# Patient Record
Sex: FEMALE | Race: WHITE | NOT HISPANIC OR LATINO | Employment: UNEMPLOYED | URBAN - NONMETROPOLITAN AREA
[De-identification: names, ages, dates, MRNs, and addresses within clinical notes are randomized per-mention and may not be internally consistent; named-entity substitution may affect disease eponyms.]

---

## 2017-01-17 ENCOUNTER — OFFICE VISIT (OUTPATIENT)
Dept: FAMILY MEDICINE | Age: 37
End: 2017-01-17

## 2017-01-17 ENCOUNTER — APPOINTMENT (OUTPATIENT)
Dept: LAB | Age: 37
End: 2017-01-17

## 2017-01-17 VITALS
WEIGHT: 147.71 LBS | SYSTOLIC BLOOD PRESSURE: 122 MMHG | RESPIRATION RATE: 16 BRPM | HEIGHT: 63 IN | BODY MASS INDEX: 26.17 KG/M2 | DIASTOLIC BLOOD PRESSURE: 70 MMHG | HEART RATE: 74 BPM

## 2017-01-17 DIAGNOSIS — G43.109 MIGRAINE WITH AURA AND WITHOUT STATUS MIGRAINOSUS, NOT INTRACTABLE: Primary | ICD-10-CM

## 2017-01-17 DIAGNOSIS — K58.0 IRRITABLE BOWEL SYNDROME WITH DIARRHEA: ICD-10-CM

## 2017-01-17 DIAGNOSIS — Z13.9 SCREENING: ICD-10-CM

## 2017-01-17 LAB
ALBUMIN SERPL-MCNC: 3.9 G/DL (ref 3.6–5.1)
ALBUMIN/GLOB SERPL: 1.2 {RATIO} (ref 1–2.4)
ALP SERPL-CCNC: 68 UNITS/L (ref 45–117)
ALT SERPL-CCNC: 17 UNITS/L
ANION GAP SERPL CALC-SCNC: 13 MMOL/L (ref 10–20)
AST SERPL-CCNC: 13 UNITS/L
BASO+EOS+MONOS # BLD AUTO: 0.6 K/MCL (ref 0.1–1.1)
BASO+EOS+MONOS NFR BLD AUTO: 10 %
BILIRUB SERPL-MCNC: 0.4 MG/DL (ref 0.2–1)
BUN SERPL-MCNC: 6 MG/DL (ref 10–20)
BUN/CREAT SERPL: 9 (ref 7–25)
CALCIUM SERPL-MCNC: 9 MG/DL (ref 8.4–10.2)
CHLORIDE SERPL-SCNC: 106 MMOL/L (ref 98–107)
CO2 SERPL-SCNC: 30 MMOL/L (ref 21–32)
CREAT SERPL-MCNC: 0.68 MG/DL (ref 0.51–0.95)
DIFFERENTIAL METHOD BLD: ABNORMAL
ERYTHROCYTE [DISTWIDTH] IN BLOOD: 13.3 % (ref 11–15)
FASTING STATUS PATIENT QL REPORTED: 0 HRS
GLOBULIN SER-MCNC: 3.3 G/DL (ref 2–4)
GLUCOSE SERPL-MCNC: 86 MG/DL (ref 65–99)
HCT VFR BLD CALC: 41.6 % (ref 36–46.5)
HGB BLD-MCNC: 13.2 G/DL (ref 12–15.5)
LYMPHOCYTES # BLD AUTO: 2.8 K/MCL (ref 1–4.8)
LYMPHOCYTES NFR BLD AUTO: 45 %
MCH RBC QN AUTO: 28.5 PG (ref 26–34)
MCHC RBC AUTO-ENTMCNC: 31.7 G/DL (ref 32–36.5)
MCV RBC AUTO: 89.8 FL (ref 78–100)
NEUTROPHILS # BLD AUTO: 2.8 K/MCL (ref 1.8–7.7)
NEUTROPHILS NFR BLD AUTO: 45 %
PLATELET # BLD: 297 K/MCL (ref 140–450)
POTASSIUM SERPL-SCNC: 3.5 MMOL/L (ref 3.4–5.1)
PROT SERPL-MCNC: 7.2 G/DL (ref 6.4–8.2)
RBC # BLD: 4.63 MIL/MCL (ref 4–5.2)
SODIUM SERPL-SCNC: 145 MMOL/L (ref 135–145)
TSH SERPL-ACNC: 1.72 MCUNITS/ML (ref 0.35–5)
WBC # BLD: 6.2 K/MCL (ref 4.2–11)

## 2017-01-17 PROCEDURE — 99214 OFFICE O/P EST MOD 30 MIN: CPT | Performed by: FAMILY MEDICINE

## 2017-01-17 PROCEDURE — 85025 COMPLETE CBC W/AUTO DIFF WBC: CPT | Performed by: INTERNAL MEDICINE

## 2017-01-17 PROCEDURE — 36415 COLL VENOUS BLD VENIPUNCTURE: CPT | Performed by: INTERNAL MEDICINE

## 2017-01-17 RX ORDER — SUMATRIPTAN 25 MG/1
TABLET, FILM COATED ORAL
Qty: 9 TABLET | Refills: 5 | Status: SHIPPED | OUTPATIENT
Start: 2017-01-17 | End: 2018-12-03 | Stop reason: SDUPTHER

## 2017-01-17 RX ORDER — METOCLOPRAMIDE 10 MG/1
10 TABLET ORAL 4 TIMES DAILY PRN
Qty: 30 TABLET | Refills: 3 | Status: SHIPPED | OUTPATIENT
Start: 2017-01-17 | End: 2018-12-03 | Stop reason: SDUPTHER

## 2017-01-18 ENCOUNTER — TELEPHONE (OUTPATIENT)
Dept: FAMILY MEDICINE | Age: 37
End: 2017-01-18

## 2017-03-01 ENCOUNTER — TELEPHONE (OUTPATIENT)
Dept: GASTROENTEROLOGY | Age: 37
End: 2017-03-01

## 2017-03-11 ENCOUNTER — HOSPITAL ENCOUNTER (EMERGENCY)
Facility: HOSPITAL | Age: 37
Discharge: HOME | End: 2017-03-11
Attending: INTERNAL MEDICINE | Admitting: INTERNAL MEDICINE
Payer: COMMERCIAL

## 2017-03-11 VITALS
HEART RATE: 83 BPM | OXYGEN SATURATION: 98 % | DIASTOLIC BLOOD PRESSURE: 83 MMHG | SYSTOLIC BLOOD PRESSURE: 100 MMHG | TEMPERATURE: 97.7 F | RESPIRATION RATE: 16 BRPM

## 2017-03-11 DIAGNOSIS — R07.1 PAINFUL RESPIRATION: ICD-10-CM

## 2017-03-11 DIAGNOSIS — R42 DIZZINESS: ICD-10-CM

## 2017-03-11 LAB
ALBUMIN SERPL-MCNC: 3.3 G/DL (ref 3.4–5)
ALBUMIN UR-MCNC: 10 MG/DL
ALP SERPL-CCNC: 52 U/L (ref 40–150)
ALT SERPL W P-5'-P-CCNC: 26 U/L (ref 0–50)
AMYLASE SERPL-CCNC: 52 U/L (ref 30–110)
ANION GAP SERPL CALCULATED.3IONS-SCNC: 7 MMOL/L (ref 3–14)
APPEARANCE UR: ABNORMAL
AST SERPL W P-5'-P-CCNC: 13 U/L (ref 0–45)
BACTERIA #/AREA URNS HPF: ABNORMAL /HPF
BASOPHILS # BLD AUTO: 0.1 10E9/L (ref 0–0.2)
BASOPHILS NFR BLD AUTO: 0.9 %
BILIRUB SERPL-MCNC: 0.3 MG/DL (ref 0.2–1.3)
BILIRUB UR QL STRIP: NEGATIVE
BUN SERPL-MCNC: 18 MG/DL (ref 7–30)
CALCIUM SERPL-MCNC: 8.8 MG/DL (ref 8.5–10.1)
CHLORIDE SERPL-SCNC: 108 MMOL/L (ref 94–109)
CO2 SERPL-SCNC: 28 MMOL/L (ref 20–32)
COLOR UR AUTO: YELLOW
CREAT SERPL-MCNC: 0.63 MG/DL (ref 0.52–1.04)
DIFFERENTIAL METHOD BLD: NORMAL
EOSINOPHIL # BLD AUTO: 0.3 10E9/L (ref 0–0.7)
EOSINOPHIL NFR BLD AUTO: 4.2 %
ERYTHROCYTE [DISTWIDTH] IN BLOOD BY AUTOMATED COUNT: 12 % (ref 10–15)
GFR SERPL CREATININE-BSD FRML MDRD: ABNORMAL ML/MIN/1.7M2
GLUCOSE SERPL-MCNC: 95 MG/DL (ref 70–99)
GLUCOSE UR STRIP-MCNC: NEGATIVE MG/DL
HCG UR QL: NEGATIVE
HCT VFR BLD AUTO: 39.4 % (ref 35–47)
HGB BLD-MCNC: 13.2 G/DL (ref 11.7–15.7)
HGB UR QL STRIP: NEGATIVE
IMM GRANULOCYTES # BLD: 0 10E9/L (ref 0–0.4)
IMM GRANULOCYTES NFR BLD: 0.5 %
KETONES UR STRIP-MCNC: NEGATIVE MG/DL
LEUKOCYTE ESTERASE UR QL STRIP: ABNORMAL
LIPASE SERPL-CCNC: 234 U/L (ref 73–393)
LYMPHOCYTES # BLD AUTO: 2.9 10E9/L (ref 0.8–5.3)
LYMPHOCYTES NFR BLD AUTO: 45.8 %
MCH RBC QN AUTO: 29.1 PG (ref 26.5–33)
MCHC RBC AUTO-ENTMCNC: 33.5 G/DL (ref 31.5–36.5)
MCV RBC AUTO: 87 FL (ref 78–100)
MONOCYTES # BLD AUTO: 0.4 10E9/L (ref 0–1.3)
MONOCYTES NFR BLD AUTO: 6.6 %
MUCOUS THREADS #/AREA URNS LPF: PRESENT /LPF
NEUTROPHILS # BLD AUTO: 2.7 10E9/L (ref 1.6–8.3)
NEUTROPHILS NFR BLD AUTO: 42 %
NITRATE UR QL: NEGATIVE
NRBC # BLD AUTO: 0 10*3/UL
NRBC BLD AUTO-RTO: 0 /100
PH UR STRIP: 7 PH (ref 4.7–8)
PLATELET # BLD AUTO: 243 10E9/L (ref 150–450)
POTASSIUM SERPL-SCNC: 3.6 MMOL/L (ref 3.4–5.3)
PROT SERPL-MCNC: 6.9 G/DL (ref 6.8–8.8)
RBC # BLD AUTO: 4.54 10E12/L (ref 3.8–5.2)
RBC #/AREA URNS AUTO: 0 /HPF (ref 0–2)
SODIUM SERPL-SCNC: 143 MMOL/L (ref 133–144)
SP GR UR STRIP: 1.02 (ref 1–1.03)
SQUAMOUS #/AREA URNS AUTO: 22 /HPF (ref 0–1)
URN SPEC COLLECT METH UR: ABNORMAL
UROBILINOGEN UR STRIP-MCNC: NORMAL MG/DL (ref 0–2)
WBC # BLD AUTO: 6.4 10E9/L (ref 4–11)
WBC #/AREA URNS AUTO: 1 /HPF (ref 0–2)

## 2017-03-11 PROCEDURE — 81001 URINALYSIS AUTO W/SCOPE: CPT | Performed by: INTERNAL MEDICINE

## 2017-03-11 PROCEDURE — 96375 TX/PRO/DX INJ NEW DRUG ADDON: CPT

## 2017-03-11 PROCEDURE — 96374 THER/PROPH/DIAG INJ IV PUSH: CPT

## 2017-03-11 PROCEDURE — 83690 ASSAY OF LIPASE: CPT | Performed by: INTERNAL MEDICINE

## 2017-03-11 PROCEDURE — 25000132 ZZH RX MED GY IP 250 OP 250 PS 637: Performed by: INTERNAL MEDICINE

## 2017-03-11 PROCEDURE — 82150 ASSAY OF AMYLASE: CPT | Performed by: INTERNAL MEDICINE

## 2017-03-11 PROCEDURE — 99284 EMERGENCY DEPT VISIT MOD MDM: CPT | Mod: 25

## 2017-03-11 PROCEDURE — 71020 ZZHC CHEST TWO VIEWS, FRONT/LAT: CPT | Mod: TC

## 2017-03-11 PROCEDURE — 81025 URINE PREGNANCY TEST: CPT | Performed by: INTERNAL MEDICINE

## 2017-03-11 PROCEDURE — 25000125 ZZHC RX 250: Performed by: INTERNAL MEDICINE

## 2017-03-11 PROCEDURE — 99284 EMERGENCY DEPT VISIT MOD MDM: CPT | Performed by: INTERNAL MEDICINE

## 2017-03-11 PROCEDURE — 85025 COMPLETE CBC W/AUTO DIFF WBC: CPT | Performed by: INTERNAL MEDICINE

## 2017-03-11 PROCEDURE — 25000128 H RX IP 250 OP 636: Performed by: INTERNAL MEDICINE

## 2017-03-11 PROCEDURE — 96361 HYDRATE IV INFUSION ADD-ON: CPT

## 2017-03-11 PROCEDURE — S0028 INJECTION, FAMOTIDINE, 20 MG: HCPCS | Performed by: INTERNAL MEDICINE

## 2017-03-11 PROCEDURE — 80053 COMPREHEN METABOLIC PANEL: CPT | Performed by: INTERNAL MEDICINE

## 2017-03-11 PROCEDURE — 36415 COLL VENOUS BLD VENIPUNCTURE: CPT | Performed by: INTERNAL MEDICINE

## 2017-03-11 RX ORDER — ONDANSETRON 2 MG/ML
4 INJECTION INTRAMUSCULAR; INTRAVENOUS ONCE
Status: COMPLETED | OUTPATIENT
Start: 2017-03-11 | End: 2017-03-11

## 2017-03-11 RX ORDER — ALUMINA, MAGNESIA, AND SIMETHICONE 2400; 2400; 240 MG/30ML; MG/30ML; MG/30ML
30 SUSPENSION ORAL ONCE
Status: COMPLETED | OUTPATIENT
Start: 2017-03-11 | End: 2017-03-11

## 2017-03-11 RX ADMIN — SODIUM CHLORIDE 1000 ML: 9 INJECTION, SOLUTION INTRAVENOUS at 21:56

## 2017-03-11 RX ADMIN — ONDANSETRON 4 MG: 2 INJECTION INTRAMUSCULAR; INTRAVENOUS at 21:57

## 2017-03-11 RX ADMIN — ALUMINUM HYDROXIDE, MAGNESIUM HYDROXIDE, AND DIMETHICONE 30 ML: 400; 400; 40 SUSPENSION ORAL at 21:58

## 2017-03-11 RX ADMIN — FAMOTIDINE 20 MG: 10 INJECTION, SOLUTION INTRAVENOUS at 22:01

## 2017-03-11 NOTE — ED AVS SNAPSHOT
[facility]    [address]    [facility] [address]    Phone:  [phone]                                       Sydni Isabel   MRN: [id number]    Department:  [facility]   Date of Visit:  3/11/2017           Patient Information     Date Of Birth          1980        Your diagnoses for this visit were:     Painful respiration     Dizziness        You were seen by Zhang Fung MD.      Follow-up Information     Call Go Villarreal MD.    Specialty:  Family Practice    Contact information:    [facility]  [address] [place] [address]  [phone]          Discharge Instructions         Dizziness (Uncertain Cause)  Dizziness is a common symptom. It may be described as lightheadedness, spinning, or feeling like you are going to faint. Dizziness can have many causes.  Be sure to tell the healthcare provider about:    All medicines you take, including prescription, over-the-counter, herbs, and supplements    Any other symptoms you have    Any health problems you are being treated for    Anything that causes the dizziness to get worse or better  Today's exam did not show an exact cause for your dizziness. Other tests may be needed. Follow up with your healthcare provider.  Home care    Dizziness that occurs with sudden standing may be a sign of mild dehydration. Drink extra fluids for the next few days.    If you recently started a new medicine, stopped a medicine, or had the dose of a current medicine changed, talk with the prescribing healthcare provider. Your medicine plan may need adjustment.    If dizziness lasts more than a few seconds, sit or lie down until it passes. This may help prevent injury in case you pass out.    Do not drive or use power tools or dangerous equipment until you have had no dizziness for at least 48 hours.  Follow-up care  Follow up with your healthcare provider for further evaluation within the next 7 days or as advised.  When to  seek medical advice  Call your healthcare provider for any of the following:    Worsening of symptoms or new symptoms    Passing out or seizure    Repeated vomiting    Headache    Palpitations (the sense that your heart is fluttering or beating fast or hard)    Shortness of breath    Blood in vomit or stool (black or red color)    Weakness of an arm or leg or one side of the face    Vision or hearing changes    Trouble walking or speaking    Chest, arm, neck, back, or jaw pain       4635-0502 [employer]. [address] [place] [address]. All rights reserved. This information is not intended as a substitute for professional medical care. Always follow your healthcare professional's instructions.        Pleurisy    If you have pleurisy, the lining around your lungs is inflamed. This is most often due to a viral infection or pneumonia. It usually lasts for 10 to 14 days. It may cause sharp pain with breathing, coughing, sneezing, and movement. Antibiotics are usually not prescribed for this condition unless pneumonia is also present.  The following tips will help you care for your condition at home:    If symptoms are severe, rest at home for the first 2 to 3 days. When you resume activity, don't let yourself get too tired.    Do not smoke. Also avoid being exposed to secondhand smoke.    You may use over-the-counter medicines to control pain, unless another pain medicine was prescribed. (Note: If you have chronic liver or kidney disease or have ever had a stomach ulcer or gastrointestinal bleeding, talk with your healthcare provider before using these medicines. Also talk to your provider if you are taking medicine to prevent blood clots.) Aspirin should never be given to anyone younger than 18 years of age who is ill with a viral infection or fever. It may cause severe liver or brain damage.  Follow-up care  Follow up with your healthcare provider, or as advised.  When to seek medical  advice  Call your healthcare provider right away if any of these occur:    Fever over 100.4 F (38 C)    Coughing up lots of colored sputum (mucus)    Redness, pain, or swelling of the leg  Call 911, or get immediate medical care  Contact emergency services right away if any of these occur:    Increasing shortness of breath    Increasing chest pain, or pain that spreads to the neck, arm, or back    Coughing up blood    3709-6629 [employer]. [address] [place] [address]. All rights reserved. This information is not intended as a substitute for professional medical care. Always follow your healthcare professional's instructions.             Review of your medicines      Our records show that you are taking the medicines listed below. If these are incorrect, please call your family doctor or clinic.        Dose / Directions Last dose taken    albuterol 108 (90 BASE) MCG/ACT Inhaler   Commonly known as:  albuterol   Dose:  1 puff   Quantity:  1 Inhaler        Inhale 1 puff into the lungs 3 times daily For 3-5 days.   Refills:  0        EPINEPHrine 0.3 MG/0.3ML injection   Commonly known as:  EPIPEN   Dose:  0.3 mg   Quantity:  2 each        Inject 0.3 mLs into the muscle once as needed for anaphylaxis for 1 dose.   Refills:  3        fluticasone 50 MCG/ACT spray   Commonly known as:  FLONASE   Dose:  2 spray   Quantity:  16 g        Spray 2 sprays into both nostrils daily   Refills:  11        loratadine 10 MG tablet   Commonly known as:  CLARITIN   Dose:  10 mg   Quantity:  30 tablet        Take 1 tablet (10 mg) by mouth daily   Refills:  11        montelukast 10 MG tablet   Commonly known as:  SINGULAIR   Dose:  10 mg   Indication:  Perennial Rhinitis   Quantity:  30 tablet        Take 1 tablet (10 mg) by mouth At Bedtime   Refills:  11        multivitamin, therapeutic with minerals Tabs tablet   Dose:  1 tablet        Take 1 tablet by mouth daily   Refills:  0        NEXIUM PO   Dose:  40  "mg        Take 40 mg by mouth every morning (before breakfast)   Refills:  0        vitamin D 92308 UNIT capsule   Commonly known as:  ERGOCALCIFEROL   Dose:  88809 Units        Take 50,000 Units by mouth once a week   Refills:  0                Procedures and tests performed during your visit     Amylase    CBC with platelets differential    Comprehensive metabolic panel    HCG qualitative urine    Lipase    UA with Microscopic reflex to Culture    XR Chest 2 Views      Orders Needing Specimen Collection     None      Pending Results     Date and Time Order Name Status Description    3/11/2017 2236 XR Chest 2 Views In process             Pending Culture Results     No orders found from 3/9/2017 to 3/12/2017.            Thank you for choosing [facility]       Thank you for choosing [facility] for your care. Our goal is always to provide you with excellent care. Hearing back from our patients is one way we can continue to improve our services. Please take a few minutes to complete the written survey that you may receive in the mail after you visit with us. Thank you!        [employer] Information     "[employer] lets you send messages to your doctor, view your test results, renew your prescriptions, schedule appointments and more. To sign up, go to [email or web address] . Click on \"Log in\" on the left side of the screen, which will take you to the Welcome page. Then click on \"Sign up Now\" on the right side of the page.     You will be asked to enter the access code listed below, as well as some personal information. Please follow the directions to create your username and password.     Your access code is: [email or web address]  Expires: 2017 11:05 PM     Your access code will  in 90 days. If you need help or a new code, please call your [facility] clinic or [phone].        Care EveryWhere ID     This is your Care EveryWhere ID. This could be used by other organizations to access your [facility] medical " records  [id number]        After Visit Summary       This is your record. Keep this with you and show to your community pharmacist(s) and doctor(s) at your next visit.

## 2017-03-11 NOTE — ED AVS SNAPSHOT
[facility]    [address] [address]    Phone:  [phone]                                       Sydni Isabel   MRN: [id number]    Department:  [facility]   Date of Visit:  3/11/2017           After Visit Summary Signature Page     I have received my discharge instructions, and my questions have been answered. I have discussed any challenges I see with this plan with the nurse or doctor.    ..........................................................................................................................................  Patient/Patient Representative Signature      ..........................................................................................................................................  Patient Representative Print Name and Relationship to Patient    ..................................................               ................................................  Date                                            Time    ..........................................................................................................................................  Reviewed by Signature/Title    ...................................................              ..............................................  Date                                                            Time

## 2017-03-12 ASSESSMENT — ENCOUNTER SYMPTOMS
CONSTIPATION: 0
APNEA: 0
ANAL BLEEDING: 0
ACTIVITY CHANGE: 0
COUGH: 1
VOICE CHANGE: 0
EYE PAIN: 0
WHEEZING: 0
NAUSEA: 1
CHEST TIGHTNESS: 0
NUMBNESS: 0
FEVER: 0
SHORTNESS OF BREATH: 0
MYALGIAS: 0
FATIGUE: 0
NERVOUS/ANXIOUS: 1
CHOKING: 0
RECTAL PAIN: 0
STRIDOR: 0
DIARRHEA: 1
TROUBLE SWALLOWING: 0
DIZZINESS: 1
EYE REDNESS: 0
BACK PAIN: 0
NECK STIFFNESS: 0
LIGHT-HEADEDNESS: 0
PALPITATIONS: 0
COLOR CHANGE: 0
HEMATURIA: 0
CONFUSION: 0
FLANK PAIN: 0
DIFFICULTY URINATING: 0
ARTHRALGIAS: 0
DYSURIA: 0
CHILLS: 0
ABDOMINAL PAIN: 1
WOUND: 0
NECK PAIN: 0
SORE THROAT: 0
UNEXPECTED WEIGHT CHANGE: 0
DIAPHORESIS: 0
BLOOD IN STOOL: 0
APPETITE CHANGE: 0
ABDOMINAL DISTENTION: 0
HEADACHES: 0
VOMITING: 0

## 2017-03-12 NOTE — DISCHARGE INSTRUCTIONS
Dizziness (Uncertain Cause)  Dizziness is a common symptom. It may be described as lightheadedness, spinning, or feeling like you are going to faint. Dizziness can have many causes.  Be sure to tell the healthcare provider about:    All medicines you take, including prescription, over-the-counter, herbs, and supplements    Any other symptoms you have    Any health problems you are being treated for    Anything that causes the dizziness to get worse or better  Today's exam did not show an exact cause for your dizziness. Other tests may be needed. Follow up with your healthcare provider.  Home care    Dizziness that occurs with sudden standing may be a sign of mild dehydration. Drink extra fluids for the next few days.    If you recently started a new medicine, stopped a medicine, or had the dose of a current medicine changed, talk with the prescribing healthcare provider. Your medicine plan may need adjustment.    If dizziness lasts more than a few seconds, sit or lie down until it passes. This may help prevent injury in case you pass out.    Do not drive or use power tools or dangerous equipment until you have had no dizziness for at least 48 hours.  Follow-up care  Follow up with your healthcare provider for further evaluation within the next 7 days or as advised.  When to seek medical advice  Call your healthcare provider for any of the following:    Worsening of symptoms or new symptoms    Passing out or seizure    Repeated vomiting    Headache    Palpitations (the sense that your heart is fluttering or beating fast or hard)    Shortness of breath    Blood in vomit or stool (black or red color)    Weakness of an arm or leg or one side of the face    Vision or hearing changes    Trouble walking or speaking    Chest, arm, neck, back, or jaw pain       4339-1900 [employer]. [address] [place] [address], [place] [address]. All rights reserved. This information is not intended as a substitute for  professional medical care. Always follow your healthcare professional's instructions.        Pleurisy    If you have pleurisy, the lining around your lungs is inflamed. This is most often due to a viral infection or pneumonia. It usually lasts for 10 to 14 days. It may cause sharp pain with breathing, coughing, sneezing, and movement. Antibiotics are usually not prescribed for this condition unless pneumonia is also present.  The following tips will help you care for your condition at home:    If symptoms are severe, rest at home for the first 2 to 3 days. When you resume activity, don't let yourself get too tired.    Do not smoke. Also avoid being exposed to secondhand smoke.    You may use over-the-counter medicines to control pain, unless another pain medicine was prescribed. (Note: If you have chronic liver or kidney disease or have ever had a stomach ulcer or gastrointestinal bleeding, talk with your healthcare provider before using these medicines. Also talk to your provider if you are taking medicine to prevent blood clots.) Aspirin should never be given to anyone younger than 18 years of age who is ill with a viral infection or fever. It may cause severe liver or brain damage.  Follow-up care  Follow up with your healthcare provider, or as advised.  When to seek medical advice  Call your healthcare provider right away if any of these occur:    Fever over 100.4 F (38 C)    Coughing up lots of colored sputum (mucus)    Redness, pain, or swelling of the leg  Call 911, or get immediate medical care  Contact emergency services right away if any of these occur:    Increasing shortness of breath    Increasing chest pain, or pain that spreads to the neck, arm, or back    Coughing up blood    6771-9271 [employer]. [address] [place] [address], [place] [address]. All rights reserved. This information is not intended as a substitute for professional medical care. Always follow your healthcare professional's  instructions.

## 2017-03-12 NOTE — ED PROVIDER NOTES
History     Chief Complaint   Patient presents with     Chest Pain     to right shoulder and dizziness starting today     Abdominal Pain     nauseated and radiates up to under ribs      The history is provided by the patient.     Sydni Isabel is a 36 year old female who came for multiple non specific complint, chest pain , pleuresy, abdomninal pain, dizziness, nausea, anxiety    I have reviewed the Medications, Allergies, Past Medical and Surgical History, and Social History in the Epic system.    Review of Systems   Constitutional: Negative for activity change, appetite change, chills, diaphoresis, fatigue, fever and unexpected weight change.   HENT: Negative for congestion, dental problem, drooling, sore throat, trouble swallowing and voice change.    Eyes: Negative for pain, redness and visual disturbance.   Respiratory: Positive for cough. Negative for apnea, choking, chest tightness, shortness of breath, wheezing and stridor.    Cardiovascular: Positive for chest pain. Negative for palpitations and leg swelling.   Gastrointestinal: Positive for abdominal pain, diarrhea and nausea. Negative for abdominal distention, anal bleeding, blood in stool, constipation, rectal pain and vomiting.   Genitourinary: Negative for decreased urine volume, difficulty urinating, dysuria, flank pain, hematuria and vaginal bleeding.   Musculoskeletal: Negative for arthralgias, back pain, gait problem, myalgias, neck pain and neck stiffness.   Skin: Negative for color change, pallor, rash and wound.   Neurological: Positive for dizziness. Negative for syncope, light-headedness, numbness and headaches.   Psychiatric/Behavioral: Negative for confusion and suicidal ideas. The patient is nervous/anxious.        Physical Exam   BP: 120/81  Pulse: 83  Heart Rate: 75  Temp: 96.8  F (36  C)  Resp: 16  SpO2: 100 %  Physical Exam   Constitutional: She is oriented to person, place, and time. She appears well-developed and well-nourished.    HENT:   Head: Normocephalic and atraumatic.   Mouth/Throat: No oropharyngeal exudate.   Eyes: Conjunctivae are normal. Pupils are equal, round, and reactive to light.   Neck: Normal range of motion. Neck supple. No JVD present. No tracheal deviation present. No thyromegaly present.   Cardiovascular: Normal rate, regular rhythm, normal heart sounds and intact distal pulses.  Exam reveals no gallop and no friction rub.    No murmur heard.  Pulmonary/Chest: Effort normal and breath sounds normal. No stridor. No respiratory distress. She has no wheezes. She has no rales. She exhibits no tenderness.   Abdominal: Soft. Bowel sounds are normal. She exhibits no distension and no mass. There is no tenderness. There is no rebound and no guarding.   Musculoskeletal: Normal range of motion. She exhibits no edema or tenderness.   Lymphadenopathy:     She has no cervical adenopathy.   Neurological: She is alert and oriented to person, place, and time.   Skin: Skin is warm and dry. No rash noted. No erythema. No pallor.   Psychiatric: Her behavior is normal.   Nursing note and vitals reviewed.      ED Course     ED Course     Procedures               Labs Ordered and Resulted from Time of ED Arrival Up to the Time of Departure from the ED   COMPREHENSIVE METABOLIC PANEL - Abnormal; Notable for the following:        Result Value    Albumin 3.3 (*)     All other components within normal limits   ROUTINE UA WITH MICROSCOPIC REFLEX TO CULTURE - Abnormal; Notable for the following:     Protein Albumin Urine 10 (*)     Leukocyte Esterase Urine Trace (*)     Bacteria Urine Few (*)     Squamous Epithelial /HPF Urine 22 (*)     Mucous Urine Present (*)     All other components within normal limits   CBC WITH PLATELETS DIFFERENTIAL   HCG QUALITATIVE URINE   LIPASE   AMYLASE       Assessments & Plan (with Medical Decision Making)   Multiple non specific symptoms  Possible related to anxiety, viral disease  Basic workup negative  I  explained to her that I dont have any definite explanation for her symptoms, I advised her to return to ER for further evaluation if symptoms persisted  She voiced understanding and agreed  I have reviewed the nursing notes.    I have reviewed the findings, diagnosis, plan and need for follow up with the patient.    New Prescriptions    No medications on file       Final diagnoses:   Painful respiration   Dizziness       3/11/2017   [facility]     Zhang Fung MD  03/12/17 0043

## 2017-03-12 NOTE — ED NOTES
"Pt states she has had diarrhea since Tuesday. No vomiting, c/o some nausea. Stated today she had episodes of dizziness \"heart racing\". C/O pain in abdomen radiating up into mid sternal area and RUQ \"like under my ribs\".  "

## 2017-04-05 ENCOUNTER — WALK IN (OUTPATIENT)
Dept: URGENT CARE | Age: 37
End: 2017-04-05

## 2017-04-05 VITALS
DIASTOLIC BLOOD PRESSURE: 61 MMHG | HEART RATE: 98 BPM | RESPIRATION RATE: 16 BRPM | SYSTOLIC BLOOD PRESSURE: 110 MMHG | BODY MASS INDEX: 26.52 KG/M2 | OXYGEN SATURATION: 99 % | HEIGHT: 63 IN | WEIGHT: 149.69 LBS | TEMPERATURE: 98.4 F

## 2017-04-05 DIAGNOSIS — J40 BRONCHITIS: Primary | ICD-10-CM

## 2017-04-05 DIAGNOSIS — J01.90 ACUTE NON-RECURRENT SINUSITIS, UNSPECIFIED LOCATION: ICD-10-CM

## 2017-04-05 PROCEDURE — 99214 OFFICE O/P EST MOD 30 MIN: CPT | Performed by: NURSE PRACTITIONER

## 2017-04-05 RX ORDER — AMOXICILLIN AND CLAVULANATE POTASSIUM 875; 125 MG/1; MG/1
1 TABLET, FILM COATED ORAL 2 TIMES DAILY
Qty: 20 TABLET | Refills: 0 | Status: SHIPPED | OUTPATIENT
Start: 2017-04-05 | End: 2017-04-15

## 2017-04-05 RX ORDER — ALBUTEROL SULFATE 90 UG/1
2 AEROSOL, METERED RESPIRATORY (INHALATION) EVERY 4 HOURS PRN
Qty: 1 INHALER | Refills: 1 | Status: SHIPPED | OUTPATIENT
Start: 2017-04-05 | End: 2018-10-10 | Stop reason: ALTCHOICE

## 2017-04-07 ENCOUNTER — OFFICE VISIT (OUTPATIENT)
Dept: CHIROPRACTIC MEDICINE | Facility: OTHER | Age: 37
End: 2017-04-07
Attending: CHIROPRACTOR
Payer: COMMERCIAL

## 2017-04-07 ENCOUNTER — TELEPHONE (OUTPATIENT)
Dept: URGENT CARE | Age: 37
End: 2017-04-07

## 2017-04-07 DIAGNOSIS — M54.2 CERVICALGIA: ICD-10-CM

## 2017-04-07 DIAGNOSIS — M99.03 SEGMENTAL AND SOMATIC DYSFUNCTION OF LUMBAR REGION: ICD-10-CM

## 2017-04-07 DIAGNOSIS — M99.01 SEGMENTAL AND SOMATIC DYSFUNCTION OF CERVICAL REGION: Primary | ICD-10-CM

## 2017-04-07 DIAGNOSIS — M99.02 SEGMENTAL AND SOMATIC DYSFUNCTION OF THORACIC REGION: ICD-10-CM

## 2017-04-07 PROCEDURE — 99212 OFFICE O/P EST SF 10 MIN: CPT | Mod: 25 | Performed by: CHIROPRACTOR

## 2017-04-07 PROCEDURE — 98941 CHIROPRACT MANJ 3-4 REGIONS: CPT | Performed by: CHIROPRACTOR

## 2017-04-07 NOTE — MR AVS SNAPSHOT
After Visit Summary   4/7/2017    Sydni Isabel    MRN: [id number]           Patient Information     Date Of Birth          1980        Visit Information        Provider Department      4/7/2017 8:10 AM [facility]        Today's Diagnoses     Segmental and somatic dysfunction of cervical region    -  1    Cervicalgia        Segmental and somatic dysfunction of lumbar region        Segmental and somatic dysfunction of thoracic region           Follow-ups after your visit        Your next 10 appointments already scheduled     Apr 28, 2017  9:00 AM CDT   (Arrive by 8:45 AM)   Return Visit with Martina Dominguez PA-C   [facility]    [address]  [facility] [address]   [phone]              Who to contact     If you have questions or need follow up information about today's clinic visit or your schedule please contact  JANA BRANTLEY directly at [phone].  Normal or non-critical lab and imaging results will be communicated to you by MyChart, letter or phone within 4 business days after the clinic has received the results. If you do not hear from us within 7 days, please contact the clinic through [employer] or phone. If you have a critical or abnormal lab result, we will notify you by phone as soon as possible.  Submit refill requests through [employer] or call your pharmacy and they will forward the refill request to us. Please allow 3 business days for your refill to be completed.          Additional Information About Your Visit        MyChart Information     [employer] gives you secure access to your electronic health record. If you see a primary care provider, you can also send messages to your care team and make appointments. If you have questions, please call your primary care clinic.  If you do not have a primary care provider, please call [phone] and they will assist you.        Care EveryWhere ID     This is your Care  EveryWhere ID. This could be used by other organizations to access your [facility] medical records  [id number]         Blood Pressure from Last 3 Encounters:   03/11/17 100/83   08/05/16 101/65   07/01/16 100/62    Weight from Last 3 Encounters:   08/05/16 174 lb (78.9 kg)   07/01/16 176 lb (79.8 kg)   01/08/16 184 lb (83.5 kg)              We Performed the Following     CHIROPRAC MANIP,SPINAL,3-4 REGIONS        Primary Care Provider Office Phone # Fax #    Go Villarreal [phone]       [facility] [address] [facility] [address]        Thank you!     Thank you for choosing  [facility]  for your care. Our goal is always to provide you with excellent care. Hearing back from our patients is one way we can continue to improve our services. Please take a few minutes to complete the written survey that you may receive in the mail after your visit with us. Thank you!             Your Updated Medication List - Protect others around you: Learn how to safely use, store and throw away your medicines at www.disposemymeds.org.          This list is accurate as of: 4/7/17 11:59 PM.  Always use your most recent med list.                   Brand Name Dispense Instructions for use    albuterol 108 (90 BASE) MCG/ACT Inhaler    albuterol    1 Inhaler    Inhale 1 puff into the lungs 3 times daily For 3-5 days.       EPINEPHrine 0.3 MG/0.3ML injection    EPIPEN    2 each    Inject 0.3 mLs into the muscle once as needed for anaphylaxis for 1 dose.       fluticasone 50 MCG/ACT spray    FLONASE    16 g    Spray 2 sprays into both nostrils daily       loratadine 10 MG tablet    CLARITIN    30 tablet    Take 1 tablet (10 mg) by mouth daily       montelukast 10 MG tablet    SINGULAIR    30 tablet    Take 1 tablet (10 mg) by mouth At Bedtime       multivitamin, therapeutic with minerals Tabs tablet      Take 1 tablet by mouth daily       NEXIUM PO      Take 40 mg by mouth every morning (before  breakfast)       vitamin D 96129 UNIT capsule    ERGOCALCIFEROL     Take 50,000 Units by mouth once a week

## 2017-04-10 ENCOUNTER — TELEPHONE (OUTPATIENT)
Dept: URGENT CARE | Age: 37
End: 2017-04-10

## 2017-04-11 NOTE — PROGRESS NOTES
Subjective Finding:    Chief compalint: Patient presents with:  Neck Pain: daily headaches  Back Pain  , Pain Scale: 6/10, Intensity: sharp, Duration: 2 months, Radiating: no.    Date of injury:     Activities that the pain restricts:   Home/household/hobbies/social activities: no.  Work duties: no.  Sleep: no.  Makes symptoms better: rest.  Makes symptoms worse: activity, cervical extension and cervical flexion.  Have you seen anyone else for the symptoms? MD.  Work related: no.  Automobile related injury: no.    Objective and Assessment:    Posture Analysis:   High shoulder: .  Head tilt: .  High iliac crest: .  Head carriage: forward.  Thoracic Kyphosis: neutral.  Lumbar Lordosis: neutral.    Lumbar Range of Motion: .  Cervical Range of Motion: extension decreased, left lateral flexion decreased and right lateral flexion decreased.  Thoracic Range of Motion: left lateral flexion decreased.  Extremity Range of Motion: .    Palpation:   Lev scapulae: stiff, referred pain: no  Traps: sharp pain, referred pain: no    Segmental dysfunction pre-treatment and treatment area: C2, C5, C7, T4 and L5.    Assessment post-treatment:  Cervical: ROM increased.  Thoracic: ROM increased.  Lumbar: ROM increased.    Comments: .      Complicating Factors: .    Plan / Procedure:    Treatment plan: 2 times per week for 2 weeks.  Instructed patient: stretch as instructed at visit.  Short term goals: increase ROM.  Long term goals: restore normal function.  Prognosis: very good.

## 2017-04-28 ENCOUNTER — OFFICE VISIT (OUTPATIENT)
Dept: OTOLARYNGOLOGY | Facility: OTHER | Age: 37
End: 2017-04-28
Attending: PHYSICIAN ASSISTANT
Payer: COMMERCIAL

## 2017-04-28 ENCOUNTER — OFFICE VISIT (OUTPATIENT)
Dept: CHIROPRACTIC MEDICINE | Facility: OTHER | Age: 37
End: 2017-04-28
Attending: CHIROPRACTOR
Payer: COMMERCIAL

## 2017-04-28 VITALS
DIASTOLIC BLOOD PRESSURE: 70 MMHG | WEIGHT: 179 LBS | HEART RATE: 73 BPM | HEIGHT: 66 IN | OXYGEN SATURATION: 100 % | SYSTOLIC BLOOD PRESSURE: 102 MMHG | BODY MASS INDEX: 28.77 KG/M2 | TEMPERATURE: 97.2 F

## 2017-04-28 DIAGNOSIS — M99.01 SEGMENTAL AND SOMATIC DYSFUNCTION OF CERVICAL REGION: Primary | ICD-10-CM

## 2017-04-28 DIAGNOSIS — J30.2 PERENNIAL ALLERGIC RHINITIS WITH SEASONAL VARIATION: Primary | ICD-10-CM

## 2017-04-28 DIAGNOSIS — M54.2 CERVICALGIA: ICD-10-CM

## 2017-04-28 DIAGNOSIS — M99.02 SEGMENTAL AND SOMATIC DYSFUNCTION OF THORACIC REGION: ICD-10-CM

## 2017-04-28 DIAGNOSIS — J32.4 CHRONIC PANSINUSITIS: ICD-10-CM

## 2017-04-28 DIAGNOSIS — J30.89 PERENNIAL ALLERGIC RHINITIS WITH SEASONAL VARIATION: Primary | ICD-10-CM

## 2017-04-28 DIAGNOSIS — J31.0 CHRONIC RHINITIS: ICD-10-CM

## 2017-04-28 DIAGNOSIS — T78.40XD ALLERGIC REACTION, SUBSEQUENT ENCOUNTER: ICD-10-CM

## 2017-04-28 DIAGNOSIS — Z98.890 HISTORY OF SINUS SURGERY: ICD-10-CM

## 2017-04-28 DIAGNOSIS — J30.2 SEASONAL ALLERGIC RHINITIS, UNSPECIFIED ALLERGIC RHINITIS TRIGGER: ICD-10-CM

## 2017-04-28 PROCEDURE — 31231 NASAL ENDOSCOPY DX: CPT | Performed by: PHYSICIAN ASSISTANT

## 2017-04-28 PROCEDURE — 99213 OFFICE O/P EST LOW 20 MIN: CPT | Mod: 25 | Performed by: PHYSICIAN ASSISTANT

## 2017-04-28 PROCEDURE — 98941 CHIROPRACT MANJ 3-4 REGIONS: CPT | Performed by: CHIROPRACTOR

## 2017-04-28 RX ORDER — EPINEPHRINE 0.3 MG/.3ML
0.3 INJECTION SUBCUTANEOUS
Qty: 6 ML | Refills: 1 | Status: SHIPPED | OUTPATIENT
Start: 2017-04-28 | End: 2017-09-26

## 2017-04-28 RX ORDER — DEXLANSOPRAZOLE 60 MG/1
60 CAPSULE, DELAYED RELEASE ORAL DAILY
COMMUNITY
End: 2021-02-19

## 2017-04-28 RX ORDER — AZELASTINE HYDROCHLORIDE, FLUTICASONE PROPIONATE 137; 50 UG/1; UG/1
1 SPRAY, METERED NASAL 2 TIMES DAILY
Qty: 2 BOTTLE | Refills: 3 | Status: SHIPPED | OUTPATIENT
Start: 2017-04-28

## 2017-04-28 ASSESSMENT — PAIN SCALES - GENERAL: PAINLEVEL: NO PAIN (0)

## 2017-04-28 NOTE — PROGRESS NOTES
"Chief Complaint   Patient presents with     RECHECK     f/u chronic rhinits, AR and plugged ears.        Sydni is here to follow up on her chronic rhinitis, seasonal allergies. Last in August 2016. Since then, she has been on daily Claritin.  Uses Singulair a couple times a week and has not used the Nasonex for a couple months. She has not noticed improvement in any of her symptoms. She continues to have sinus pressure and PND. Continues to have intermittent sensation of bilateral ears feeling plugged. Occasional sharp pain in both ears, more so from wind. Feels blocked at times. Left worse than right. No otorrhea or hearing problems. No tinnitus or vertigo.     She is more concerned about white spots in the back of her throat that she had noticed a couple months ago. She was tested for strep at that time and was negative. The white spots never resolved. Occasionally will have a hard time swallowing, blaming it on her GERD. Will have intermittent cough due to the PND. No fever or SOB. She is considering the allergy shots.       8/5/16  MQT-  Dilution #6: Dust  Dilution #5: Grass, Cat, dog  Dilution #2: Trees, molds, molds.     No past medical history on file.   No Known Allergies  Current Outpatient Prescriptions   Medication     montelukast (SINGULAIR) 10 MG tablet     loratadine (CLARITIN) 10 MG tablet     fluticasone (FLONASE) 50 MCG/ACT nasal spray     Esomeprazole Magnesium (NEXIUM PO)     multivitamin, therapeutic with minerals (THERA-VIT-M) TABS     albuterol (ALBUTEROL) 108 (90 BASE) MCG/ACT inhaler     vitamin D (ERGOCALCIFEROL) 26093 UNIT capsule     EPINEPHrine (EPIPEN) 0.3 MG/0.3ML injection     No current facility-administered medications for this visit.       ROS: 10 point ROS neg other than the symptoms noted above in the HPI.  /70 (BP Location: Right arm, Patient Position: Chair, Cuff Size: Adult Regular)  Pulse 73  Temp 97.2  F (36.2  C) (Tympanic)  Ht 5' 6\" (1.676 m)  Wt 179 lb (81.2 kg) "  SpO2 100%  BMI 28.89 kg/m2  General Appearance:  healthy, alert, active and no distress  Head: Normocephalic. No masses, lesions, tenderness or abnormalities  Eyes: conjuctiva clear, PERRL, EOM intact  Ears: External ears normal.TM is intact without effusion.    Nose: Nares normal. Nasal turbinate hypertrophy.   Mouth: normal  Neck: Supple, no cervical adenopathy, no thyromegaly, Full range of motion in all planes        The lips appear normal and without lesion.  The dentition is  in good condition  The patient has a normal appearing and functioning hard and soft palate without bifid uvula or submucosal cleft.  The tongue is normal in appearance without erosive lesion or fungating mass.  The oral mucosa is soft and normal in appearance.  The patient also has a soft floor of mouth and base of tongue.  The tonsils are 2+.      To further evaluate the nasal cavity, I performed rigid nasal endoscopy.  I first sprayed the nasal cavity bilaterally with a mix of lidocaine and neosynephrine.  I then began on the left side using a 2.7mm, 30 degree rigid scope.  Clear secretions noted, no evidence of purulence, or polyps were noted. The left middle turbinate and middle meatus were clearly visualized and normal in appearance.  Looking up, the olfactory cleft was unobstructed.  Going further back, the sphenoethmoid recess was normal in appearance, with healthy appearing mucosa on the face of the sphenoid.  The nasopharynx was unremarkable, and the eustachian tube opening on this side was unobstructed.    I then turned my attention to the right side.   Clear secretions present, no purulence, polyps were noted.  The right middle turbinate and middle meatus were clearly visualized and normal in appearance.  Looking up, the olfactory cleft was unobstructed.  Going further back the right sphenoethmoid recess was normal in appearance, and eustachian tube opening was unobstructed.        ASSESSMENT:    ICD-10-CM    1. Perennial  allergic rhinitis with seasonal variation J30.89 azelastine-fluticasone (DYMISTA) 137-50 MCG/ACT nasal spray    J30.2    2. Seasonal allergic rhinitis, unspecified allergic rhinitis trigger J30.2 azelastine-fluticasone (DYMISTA) 137-50 MCG/ACT nasal spray   3. Chronic rhinitis J31.0    4. Allergic reaction, subsequent encounter T78.40XD EPINEPHrine 0.3 MG/0.3ML injection   5. Chronic pansinusitis J32.4    6. History of sinus surgery Z98.890    Continue with rinse  Continue with Claritin and Singulair  Try Dymista 1 spray twice a day  Sinus CT to be completed  Epipen renewed.   Return to start allergy injections.       Use nasal saline as discussed today. Over the counter Rosalba med saline irrigation is recommended.  Try to use hypertonic saline which is 2 packages in 1 rosalba med bottle per instructions on bottle.  Use this at least 2 times a day, blow your nose gently, then apply any other nasal medication that may have been prescribed today (nasal steroids or nasal antihistamines).  Take your antihistamine daily (cetirizine, loratadine, fexofenadine, or similar) or twice daily if recommended.      Due to the findings above,  modified quantitative testing (MQT) will be performed.  Signed consent was obtained, and the risks of immunotherapy were discussed, including the potential for anaphylaxis.    If immunotherapy (IT) is recommended, there is continued risk of anaphylaxis.   Anaphylaxis can cause death. The patient will need to be monitored for 30 minutes post injection.  They must present their epinephrine pen prior to injection.  Subcutaneous as well as sublingual immunotherapy (SLIT) were discussed as potential treatment options.  The patient was told SLIT is not approved by the FDA and is cash pay.  The general time frame of immunotherapy was discussed (generally 3-5 years, sometimes longer), and the basic immunology behind IT was discussed.          Martina Dominguez PA-C  ENT  [facility],  [place]  [phone]

## 2017-04-28 NOTE — MR AVS SNAPSHOT
After Visit Summary   4/28/2017    Sydni Isabel    MRN: [id number]           Patient Information     Date Of Birth          1980        Visit Information        Provider Department      4/28/2017 8:20 AM [facility]        Today's Diagnoses     Segmental and somatic dysfunction of cervical region    -  1    Cervicalgia        Segmental and somatic dysfunction of thoracic region           Follow-ups after your visit        Your next 10 appointments already scheduled     Apr 28, 2017  9:00 AM CDT   (Arrive by 8:45 AM)   Return Visit with Martina Dominguez PA-C   [facility] [place] [facility]    [address]  [place] [address]   [phone]              Who to contact     If you have questions or need follow up information about today's clinic visit or your schedule please contact  JANA BRANTLEY directly at [phone].  Normal or non-critical lab and imaging results will be communicated to you by MyChart, letter or phone within 4 business days after the clinic has received the results. If you do not hear from us within 7 days, please contact the clinic through [employer] or phone. If you have a critical or abnormal lab result, we will notify you by phone as soon as possible.  Submit refill requests through ipnexus or call your pharmacy and they will forward the refill request to us. Please allow 3 business days for your refill to be completed.          Additional Information About Your Visit        MyChart Information     ipnexus gives you secure access to your electronic health record. If you see a primary care provider, you can also send messages to your care team and make appointments. If you have questions, please call your primary care clinic.  If you do not have a primary care provider, please call [phone] and they will assist you.        Care EveryWhere ID     This is your Care EveryWhere ID. This could be used by other  organizations to access your [facility] medical records  [id number]         Blood Pressure from Last 3 Encounters:   03/11/17 100/83   08/05/16 101/65   07/01/16 100/62    Weight from Last 3 Encounters:   08/05/16 174 lb (78.9 kg)   07/01/16 176 lb (79.8 kg)   01/08/16 184 lb (83.5 kg)              We Performed the Following     CHIROPRAC MANIP,SPINAL,1-2 REGIONS        Primary Care Provider Office Phone # Fax #    oG Villarreal [phone]       Counts include 234 beds at the [facility] [address] [facility] [address]        Thank you!     Thank you for choosing  [facility]  for your care. Our goal is always to provide you with excellent care. Hearing back from our patients is one way we can continue to improve our services. Please take a few minutes to complete the written survey that you may receive in the mail after your visit with us. Thank you!             Your Updated Medication List - Protect others around you: Learn how to safely use, store and throw away your medicines at www.disposemymeds.org.          This list is accurate as of: 4/28/17  8:39 AM.  Always use your most recent med list.                   Brand Name Dispense Instructions for use    albuterol 108 (90 BASE) MCG/ACT Inhaler    albuterol    1 Inhaler    Inhale 1 puff into the lungs 3 times daily For 3-5 days.       EPINEPHrine 0.3 MG/0.3ML injection    EPIPEN    2 each    Inject 0.3 mLs into the muscle once as needed for anaphylaxis for 1 dose.       fluticasone 50 MCG/ACT spray    FLONASE    16 g    Spray 2 sprays into both nostrils daily       loratadine 10 MG tablet    CLARITIN    30 tablet    Take 1 tablet (10 mg) by mouth daily       montelukast 10 MG tablet    SINGULAIR    30 tablet    Take 1 tablet (10 mg) by mouth At Bedtime       multivitamin, therapeutic with minerals Tabs tablet      Take 1 tablet by mouth daily       NEXIUM PO      Take 40 mg by mouth every morning (before breakfast)       vitamin D 47530 UNIT capsule     ERGOCALCIFEROL     Take 50,000 Units by mouth once a week

## 2017-04-28 NOTE — NURSING NOTE
"Chief Complaint   Patient presents with     RECHECK     f/u chronic rhinits, AR and plugged ears.        Initial /70 (BP Location: Right arm, Patient Position: Chair, Cuff Size: Adult Regular)  Pulse 73  Temp 97.2  F (36.2  C) (Tympanic)  Ht 5' 6\" (1.676 m)  Wt 179 lb (81.2 kg)  SpO2 100%  BMI 28.89 kg/m2 Estimated body mass index is 28.89 kg/(m^2) as calculated from the following:    Height as of this encounter: 5' 6\" (1.676 m).    Weight as of this encounter: 179 lb (81.2 kg).  Medication Reconciliation: complete     Janel White LPN      "

## 2017-04-28 NOTE — PROGRESS NOTES
Subjective Finding:    Chief compalint: Patient presents with:  Neck Pain  Back Pain: upper back pain  , Pain Scale: 3/10, Intensity: sharp, Duration: 2 months, Radiating: no.    Date of injury:     Activities that the pain restricts:   Home/household/hobbies/social activities: no.  Work duties: no.  Sleep: no.  Makes symptoms better: rest.  Makes symptoms worse: activity, cervical extension and cervical flexion.  Have you seen anyone else for the symptoms? MD.  Work related: no.  Automobile related injury: no.    Objective and Assessment:    Posture Analysis:   High shoulder: .  Head tilt: .  High iliac crest: .  Head carriage: forward.  Thoracic Kyphosis: neutral.  Lumbar Lordosis: neutral.    Lumbar Range of Motion: .  Cervical Range of Motion: extension decreased, left lateral flexion decreased and right lateral flexion decreased.  Thoracic Range of Motion: left lateral flexion decreased.  Extremity Range of Motion: .    Palpation:   Lev scapulae: stiff, referred pain: no  Traps: sharp pain, referred pain: no    Segmental dysfunction pre-treatment and treatment area: C2, C5, C7, T4 and L5.    Assessment post-treatment:  Cervical: ROM increased.  Thoracic: ROM increased.  Lumbar: ROM increased.    Comments: .      Complicating Factors: .    Plan / Procedure:    Treatment plan: 2 times per week for 2 weeks.  Instructed patient: stretch as instructed at visit.  Short term goals: increase ROM.  Long term goals: restore normal function.  Prognosis: very good.

## 2017-04-28 NOTE — PATIENT INSTRUCTIONS
Continue with rinse  Continue with Claritin and Singulair  Try Dymista 1 spray twice a day  Sinus CT to be completed  Epipen renewed.   Return to start allergy injections.         If there are concerns or questions, Call [phone] and ask for nurse

## 2017-04-28 NOTE — MR AVS SNAPSHOT
After Visit Summary   4/28/2017    Sydni Isabel    MRN: [id number]           Patient Information     Date Of Birth          1980        Visit Information        Provider Department      4/28/2017 9:00 AM Martina Dominguez PA-C [facility]        Today's Diagnoses     Perennial allergic rhinitis with seasonal variation    -  1    Seasonal allergic rhinitis, unspecified allergic rhinitis trigger        Chronic rhinitis        Allergic reaction, subsequent encounter          Care Instructions    Continue with rinse  Continue with Claritin and Singulair  Try Dymista 1 spray twice a day  Sinus CT to be completed  Epipen renewed.   Return to start allergy injections.         If there are concerns or questions, Call [phone] and ask for nurse        Follow-ups after your visit        Who to contact     If you have questions or need follow up information about today's clinic visit or your schedule please contact [facility] directly at [phone].  Normal or non-critical lab and imaging results will be communicated to you by weeSPINhart, letter or phone within 4 business days after the clinic has received the results. If you do not hear from us within 7 days, please contact the clinic through weeSPINhart or phone. If you have a critical or abnormal lab result, we will notify you by phone as soon as possible.  Submit refill requests through EUROBOX or call your pharmacy and they will forward the refill request to us. Please allow 3 business days for your refill to be completed.          Additional Information About Your Visit        MyChart Information     EUROBOX gives you secure access to your electronic health record. If you see a primary care provider, you can also send messages to your care team and make appointments. If you have questions, please call your primary care clinic.  If you do not have a primary care provider, please call [phone] and they will assist you.        Care  "EveryWhere ID     This is your Care EveryWhere ID. This could be used by other organizations to access your [facility] medical records  [id number]        Your Vitals Were     Pulse Temperature Height Pulse Oximetry BMI (Body Mass Index)       73 97.2  F (36.2  C) (Tympanic) 5' 6\" (1.676 m) 100% 28.89 kg/m2        Blood Pressure from Last 3 Encounters:   04/28/17 102/70   03/11/17 100/83   08/05/16 101/65    Weight from Last 3 Encounters:   04/28/17 179 lb (81.2 kg)   08/05/16 174 lb (78.9 kg)   07/01/16 176 lb (79.8 kg)              Today, you had the following     No orders found for display         Today's Medication Changes          These changes are accurate as of: 4/28/17  9:51 AM.  If you have any questions, ask your nurse or doctor.               Start taking these medicines.        Dose/Directions    azelastine-fluticasone 137-50 MCG/ACT nasal spray   Commonly known as:  DYMISTA   Used for:  Perennial allergic rhinitis with seasonal variation, Seasonal allergic rhinitis, unspecified allergic rhinitis trigger   Started by:  Martina Dominguez PA-C        Dose:  1 spray   Spray 1 spray into both nostrils 2 times daily   Quantity:  2 Bottle   Refills:  3         These medicines have changed or have updated prescriptions.        Dose/Directions    * EPINEPHrine 0.3 MG/0.3ML injection   Commonly known as:  EPIPEN   This may have changed:  Another medication with the same name was added. Make sure you understand how and when to take each.   Used for:  Chronic rhinitis   Changed by:  Martina Dominguez PA-C        Dose:  0.3 mg   Inject 0.3 mLs into the muscle once as needed for anaphylaxis for 1 dose.   Quantity:  2 each   Refills:  3       * EPINEPHrine 0.3 MG/0.3ML injection   This may have changed:  You were already taking a medication with the same name, and this prescription was added. Make sure you understand how and when to take each.   Used for:  Allergic reaction, subsequent encounter   Changed by:  Martina Dominguez PA-C    "     Dose:  0.3 mg   Inject 0.3 mLs (0.3 mg) into the muscle once as needed   Quantity:  6 mL   Refills:  1       * Notice:  This list has 2 medication(s) that are the same as other medications prescribed for you. Read the directions carefully, and ask your doctor or other care provider to review them with you.      Stop taking these medicines if you haven't already. Please contact your care team if you have questions.     NEXIUM PO   Stopped by:  Martina Dominguez PA-C                Where to get your medicines      These medications were sent to [facility] [place], [place] - [address] [place] [address]  [address] [address]     Phone:  [phone]     azelastine-fluticasone 137-50 MCG/ACT nasal spray    EPINEPHrine 0.3 MG/0.3ML injection                Primary Care Provider Office Phone # Fax #    Go Villarreal [phone]       Pending sale to [facility] [address] [address]        Thank you!     Thank you for choosing [facility]  for your care. Our goal is always to provide you with excellent care. Hearing back from our patients is one way we can continue to improve our services. Please take a few minutes to complete the written survey that you may receive in the mail after your visit with us. Thank you!             Your Updated Medication List - Protect others around you: Learn how to safely use, store and throw away your medicines at www.disposemymeds.org.          This list is accurate as of: 4/28/17  9:51 AM.  Always use your most recent med list.                   Brand Name Dispense Instructions for use    albuterol 108 (90 BASE) MCG/ACT Inhaler    albuterol    1 Inhaler    Inhale 1 puff into the lungs 3 times daily For 3-5 days.       azelastine-fluticasone 137-50 MCG/ACT nasal spray    DYMISTA    2 Bottle    Spray 1 spray into both nostrils 2 times daily       dexlansoprazole 60 MG Cpdr CR capsule    DEXILANT     Take 60 mg by mouth daily       *  EPINEPHrine 0.3 MG/0.3ML injection    EPIPEN    2 each    Inject 0.3 mLs into the muscle once as needed for anaphylaxis for 1 dose.       * EPINEPHrine 0.3 MG/0.3ML injection     6 mL    Inject 0.3 mLs (0.3 mg) into the muscle once as needed       fluticasone 50 MCG/ACT spray    FLONASE    16 g    Spray 2 sprays into both nostrils daily       loratadine 10 MG tablet    CLARITIN    30 tablet    Take 1 tablet (10 mg) by mouth daily       montelukast 10 MG tablet    SINGULAIR    30 tablet    Take 1 tablet (10 mg) by mouth At Bedtime       multivitamin, therapeutic with minerals Tabs tablet      Take 1 tablet by mouth daily       vitamin D 74652 UNIT capsule    ERGOCALCIFEROL     Take 50,000 Units by mouth once a week       * Notice:  This list has 2 medication(s) that are the same as other medications prescribed for you. Read the directions carefully, and ask your doctor or other care provider to review them with you.

## 2017-05-02 ENCOUNTER — HOSPITAL ENCOUNTER (OUTPATIENT)
Dept: CT IMAGING | Facility: HOSPITAL | Age: 37
Discharge: HOME | End: 2017-05-02
Attending: PHYSICIAN ASSISTANT | Admitting: PHYSICIAN ASSISTANT
Payer: COMMERCIAL

## 2017-05-02 PROCEDURE — 70486 CT MAXILLOFACIAL W/O DYE: CPT | Mod: TC

## 2017-05-03 ENCOUNTER — ALLIED HEALTH/NURSE VISIT (OUTPATIENT)
Dept: ALLERGY | Facility: OTHER | Age: 37
End: 2017-05-03
Attending: PHYSICIAN ASSISTANT
Payer: COMMERCIAL

## 2017-05-03 DIAGNOSIS — J30.89 PERENNIAL ALLERGIC RHINITIS: ICD-10-CM

## 2017-05-03 PROBLEM — J30.9 ALLERGIC RHINITIS: Status: ACTIVE | Noted: 2017-05-03

## 2017-05-03 PROCEDURE — 95165 ANTIGEN THERAPY SERVICES: CPT

## 2017-05-03 PROCEDURE — 95165 ANTIGEN THERAPY SERVICES: CPT | Performed by: PHYSICIAN ASSISTANT

## 2017-05-03 NOTE — PROGRESS NOTES
Allergy serum is mixed today at schedule 1of 4,  into  One Silver  (5 ml)  multi dose vial/vials.    Allergens included were:    Ragweed  0.2 ml of dilution # 0  Pigweed  0.2 ml of dilution # 0  Mugwort 0.2 ml of dilution # 0  Kochia  0.2 ml of dilution # 0  Russian Thistle 0.2 ml of dilution # 2  Viraj Grass 0.2 ml of dilution # 2  Birch mix 0.2 ml of dilution # 2  Maple Mix 0.2 of dilution # 2  Elm Mix 0.2 ml of dilution # 2  Oak Mix 0.2 ml of dilution # 2  Joey Mix 0.2 ml of dilution # 2  Pine Mix 0.2 ml of dilution # 2  [place] Hero 0.2 ml of dilution # 0  Black Froid 0.2 ml of dilution # 0  Aspen 0.2 ml of dilution # 0  Red [place] 0.2 ml of dilution # 0    Alternaria 0.2 ml of dilution # 2  Aspergillus 0.2 ml of dilution # 0  Hormodendrum 0.2 ml of dilution # 0  Helminthosporium 0.2 ml of dilution # 0  Penicillium 0.2 ml of dilution # 2  Epicoccum 0.2 ml of dilution # 0  Fusarium 0.2 ml of dilution # 0  Mucor 0.2 ml of dilution # 0  Grain Smut 0.2 ml of dilution # 0  Grass Smut 0.2 ml of dilution # 0  Cat 0.2 ml of dilution # 2  Dog 0.2 ml of dilution # 2  Feather Mix 0.2 ml of dilution # 0  Dust Mite Mix 0.2 ml of dilution # 4  Horse 0.2 ml of dilution # 0    Thuy Stone RN

## 2017-05-03 NOTE — MR AVS SNAPSHOT
After Visit Summary   5/3/2017    Sydni Isabel    MRN: [id number]           Patient Information     Date Of Birth          1980        Visit Information        Provider Department      5/3/2017 9:30 AM  SHOT ROOM [facility]        Today's Diagnoses     Perennial allergic rhinitis           Follow-ups after your visit        Who to contact     If you have questions or need follow up information about today's clinic visit or your schedule please contact [facility] directly at [phone].  Normal or non-critical lab and imaging results will be communicated to you by MyChart, letter or phone within 4 business days after the clinic has received the results. If you do not hear from us within 7 days, please contact the clinic through Atmosfer[employer] or phone. If you have a critical or abnormal lab result, we will notify you by phone as soon as possible.  Submit refill requests through [employer] or call your pharmacy and they will forward the refill request to us. Please allow 3 business days for your refill to be completed.          Additional Information About Your Visit        MyChart Information     [employer] gives you secure access to your electronic health record. If you see a primary care provider, you can also send messages to your care team and make appointments. If you have questions, please call your primary care clinic.  If you do not have a primary care provider, please call [phone] and they will assist you.        Care EveryWhere ID     This is your Care EveryWhere ID. This could be used by other organizations to access your [facility] medical records  [id number]         Blood Pressure from Last 3 Encounters:   04/28/17 102/70   03/11/17 100/83   08/05/16 101/65    Weight from Last 3 Encounters:   04/28/17 179 lb (81.2 kg)   08/05/16 174 lb (78.9 kg)   07/01/16 176 lb (79.8 kg)              Today, you had the following     No orders found for display       Primary  Care Provider Office Phone # Fax #    Go Villarreal [phone]       [facility] [address] [facility] [address]        Thank you!     Thank you for choosing [facility]  for your care. Our goal is always to provide you with excellent care. Hearing back from our patients is one way we can continue to improve our services. Please take a few minutes to complete the written survey that you may receive in the mail after your visit with us. Thank you!             Your Updated Medication List - Protect others around you: Learn how to safely use, store and throw away your medicines at www.disposemymeds.org.          This list is accurate as of: 5/3/17  1:30 PM.  Always use your most recent med list.                   Brand Name Dispense Instructions for use    albuterol 108 (90 BASE) MCG/ACT Inhaler    albuterol    1 Inhaler    Inhale 1 puff into the lungs 3 times daily For 3-5 days.       azelastine-fluticasone 137-50 MCG/ACT nasal spray    DYMISTA    2 Bottle    Spray 1 spray into both nostrils 2 times daily       dexlansoprazole 60 MG Cpdr CR capsule    DEXILANT     Take 60 mg by mouth daily       * EPINEPHrine 0.3 MG/0.3ML injection    EPIPEN    2 each    Inject 0.3 mLs into the muscle once as needed for anaphylaxis for 1 dose.       * EPINEPHrine 0.3 MG/0.3ML injection     6 mL    Inject 0.3 mLs (0.3 mg) into the muscle once as needed       fluticasone 50 MCG/ACT spray    FLONASE    16 g    Spray 2 sprays into both nostrils daily       loratadine 10 MG tablet    CLARITIN    30 tablet    Take 1 tablet (10 mg) by mouth daily       montelukast 10 MG tablet    SINGULAIR    30 tablet    Take 1 tablet (10 mg) by mouth At Bedtime       multivitamin, therapeutic with minerals Tabs tablet      Take 1 tablet by mouth daily       vitamin D 84504 UNIT capsule    ERGOCALCIFEROL     Take 50,000 Units by mouth once a week       * Notice:  This list has 2 medication(s) that are the same as  other medications prescribed for you. Read the directions carefully, and ask your doctor or other care provider to review them with you.

## 2017-05-09 ENCOUNTER — TELEPHONE (OUTPATIENT)
Dept: ALLERGY | Facility: OTHER | Age: 37
End: 2017-05-09

## 2017-05-09 DIAGNOSIS — J30.89 PERENNIAL ALLERGIC RHINITIS: Primary | ICD-10-CM

## 2017-05-09 NOTE — TELEPHONE ENCOUNTER
Orders for SCIT are placed, and SLIT orders are DCd, as patient is starting SCIT.  Ana Luisa Khan

## 2017-05-10 ENCOUNTER — MED INFO FORMS (OUTPATIENT)
Dept: FAMILY MEDICINE | Age: 37
End: 2017-05-10

## 2017-05-31 ENCOUNTER — TELEPHONE (OUTPATIENT)
Dept: INTERNAL MEDICINE | Age: 37
End: 2017-05-31

## 2017-06-09 ENCOUNTER — ALLIED HEALTH/NURSE VISIT (OUTPATIENT)
Dept: ALLERGY | Facility: OTHER | Age: 37
End: 2017-06-09
Attending: PHYSICIAN ASSISTANT
Payer: COMMERCIAL

## 2017-06-09 DIAGNOSIS — J30.9 ALLERGIC RHINITIS: ICD-10-CM

## 2017-06-09 PROCEDURE — 95024 IQ TESTS W/ALLERGENIC XTRCS: CPT | Mod: TC

## 2017-06-09 NOTE — PROGRESS NOTES
Prior to injection verified patient identity using patient's name and date of birth.    Sydni is restarting her immunotherapy.  Instructions and all general information are again reviewed with her about her allergy shots, and epi pen use.  Signs/symptoms of both local and systemic reactions are reviewed with her. She verbalized understanding of how and when to use her epi pens.    Safety Vial Test was done in  left arm for new silver vial # 1.  Administered 0.01 ml (ID) for SVT.   SVT = 13mm.    Failed.    Allergy injection is held today.  She will return in 1 week for repeat SVT.  She is to use antihistamines as directed.      SLIT orders are per RANDELL Basilio.      Ana Luisa Khan RN

## 2017-06-09 NOTE — MR AVS SNAPSHOT
After Visit Summary   6/9/2017    Sydni Isabel    MRN: [id number]           Patient Information     Date Of Birth          1980        Visit Information        Provider Department      6/9/2017 10:30 AM HC SHOT ROOM [facility] [place]        Today's Diagnoses     Allergic rhinitis           Follow-ups after your visit        Your next 10 appointments already scheduled     Jun 16, 2017  9:30 AM CDT   injection with HC SHOT ROOM   [facility] [place] ([facility] [place] [facility])    [address]  [place] [address]   [phone]            Jun 23, 2017 10:30 AM CDT   injection with HC SHOT ROOM   [facility] [place] ([facility] [place] [facility])    [address]  [place] [address]   [phone]            Jun 30, 2017 10:30 AM CDT   injection with HC SHOT ROOM   [facility] [place] ([facility] [place] [facility])    [address]  [place] [address]   [phone]            Jul 07, 2017 10:30 AM CDT   injection with HC SHOT ROOM   [facility] [place] ([facility] [place] [facility])    [address]  [place] [address]   [phone]            Jul 10, 2017  1:15 PM CDT   (Arrive by 1:00 PM)   Return Visit with Yulisa Quiroz MD   [facility] [place] ([facility] [place] [facility])    [address]  [place] [address]   [phone]            Jul 14, 2017 10:30 AM CDT   injection with HC SHOT ROOM   [facility] [place] ([facility] [place] [facility])    [address]  [place] [address]   [phone]            Jul 21, 2017 10:30 AM CDT   injection with HC SHOT ROOM   [facility] [place] ([facility] [place] [facility])    [address]  [place] [address]   [phone]            Jul 28, 2017 10:30 AM CDT   injection with HC SHOT ROOM   [facility] [place] ([facility] [place] [facility])    [address]  [place] [address]   [phone]              Who to contact     If you have questions or need follow up information about today's clinic visit or your schedule please contact [facility]  [place] directly at [phone].  Normal or non-critical lab and imaging results will be communicated to you by MyChart, letter or phone within 4 business days after the clinic has received the results. If you do not hear from us within 7 days, please contact the clinic through Audio Shackhart or phone. If you have a critical or abnormal lab result, we will notify you by phone as soon as possible.  Submit refill requests through [employer] or call your pharmacy and they will forward the refill request to us. Please allow 3 business days for your refill to be completed.          Additional Information About Your Visit        Audio Shackhart Information     [employer] gives you secure access to your electronic health record. If you see a primary care provider, you can also send messages to your care team and make appointments. If you have questions, please call your primary care clinic.  If you do not have a primary care provider, please call [phone] and they will assist you.        Care EveryWhere ID     This is your Care EveryWhere ID. This could be used by other organizations to access your [facility] medical records  [id number]         Blood Pressure from Last 3 Encounters:   04/28/17 102/70   03/11/17 100/83   08/05/16 101/65    Weight from Last 3 Encounters:   04/28/17 179 lb (81.2 kg)   08/05/16 174 lb (78.9 kg)   07/01/16 176 lb (79.8 kg)              Today, you had the following     No orders found for display       Primary Care Provider Office Phone # Fax #    Go Villarreal [phone]       [facility] [address] [place] [address]        Thank you!     Thank you for choosing [facility]  for your care. Our goal is always to provide you with excellent care. Hearing back from our patients is one way we can continue to improve our services. Please take a few minutes to complete the written survey that you may receive in the mail after your visit with us. Thank you!              Your Updated Medication List - Protect others around you: Learn how to safely use, store and throw away your medicines at www.disposemymeds.org.          This list is accurate as of: 6/9/17 11:18 AM.  Always use your most recent med list.                   Brand Name Dispense Instructions for use    albuterol 108 (90 BASE) MCG/ACT Inhaler    albuterol    1 Inhaler    Inhale 1 puff into the lungs 3 times daily For 3-5 days.       ALLERGEN IMMUNOTHERAPY PRESCRIPTION     5 mL    Proceed with SCIT per standard buildup protocol.       azelastine-fluticasone 137-50 MCG/ACT nasal spray    DYMISTA    2 Bottle    Spray 1 spray into both nostrils 2 times daily       dexlansoprazole 60 MG Cpdr CR capsule    DEXILANT     Take 60 mg by mouth daily       * EPINEPHrine 0.3 MG/0.3ML injection    EPIPEN    2 each    Inject 0.3 mLs into the muscle once as needed for anaphylaxis for 1 dose.       * EPINEPHrine 0.3 MG/0.3ML injection     6 mL    Inject 0.3 mLs (0.3 mg) into the muscle once as needed       fluticasone 50 MCG/ACT spray    FLONASE    16 g    Spray 2 sprays into both nostrils daily       loratadine 10 MG tablet    CLARITIN    30 tablet    Take 1 tablet (10 mg) by mouth daily       montelukast 10 MG tablet    SINGULAIR    30 tablet    Take 1 tablet (10 mg) by mouth At Bedtime       multivitamin, therapeutic with minerals Tabs tablet      Take 1 tablet by mouth daily       vitamin D 91763 UNIT capsule    ERGOCALCIFEROL     Take 50,000 Units by mouth once a week       * Notice:  This list has 2 medication(s) that are the same as other medications prescribed for you. Read the directions carefully, and ask your doctor or other care provider to review them with you.

## 2017-06-16 ENCOUNTER — ALLIED HEALTH/NURSE VISIT (OUTPATIENT)
Dept: ALLERGY | Facility: OTHER | Age: 37
End: 2017-06-16
Attending: PHYSICIAN ASSISTANT
Payer: COMMERCIAL

## 2017-06-16 DIAGNOSIS — J30.9 ALLERGIC RHINITIS, UNSPECIFIED: Primary | ICD-10-CM

## 2017-06-16 PROCEDURE — 95115 IMMUNOTHERAPY ONE INJECTION: CPT

## 2017-06-16 NOTE — MR AVS SNAPSHOT
After Visit Summary   6/16/2017    Sydni Isabel    MRN: [id number]           Patient Information     Date Of Birth          1980        Visit Information        Provider Department      6/16/2017 9:30 AM HC SHOT ROOM [facility] [place]        Today's Diagnoses     Allergic rhinitis, unspecified    -  1       Follow-ups after your visit        Your next 10 appointments already scheduled     Jun 23, 2017 10:30 AM CDT   injection with HC SHOT ROOM   [facility] [place] ([facility] [place] )    [address]  [place] [address]   [phone]            Jun 30, 2017 10:30 AM CDT   injection with HC SHOT ROOM   [facility] [place] ([facility] [place] )    [address]  [place] [address]   [phone]            Jul 07, 2017 10:30 AM CDT   injection with HC SHOT ROOM   [facility] [place] ([facility] [place] )    [address]  [place] [address]   [phone]            Jul 10, 2017  1:15 PM CDT   (Arrive by 1:00 PM)   Return Visit with Yulisa Quiroz MD   [facility] [place] ([facility] [place] )    [address]  [place] [address]   [phone]            Jul 14, 2017 10:30 AM CDT   injection with HC SHOT ROOM   [facility] [place] ([facility] [place] )    [address]  [place] [address]   [phone]            Jul 21, 2017 10:30 AM CDT   injection with HC SHOT ROOM   [facility] [place] ([facility] [place] )    [address]  [place] [address]   [phone]            Jul 28, 2017 10:30 AM CDT   injection with HC SHOT ROOM   [facility] [place] ([facility] [place] )    [address]  [place] [address]   [phone]              Who to contact     If you have questions or need follow up information about today's clinic visit or your schedule please contact [facility] [place] directly at [phone].  Normal or  non-critical lab and imaging results will be communicated to you by MyChart, letter or phone within 4 business days after the clinic has received the results. If you do not hear from us within 7 days, please contact the clinic through [employer] or phone. If you have a critical or abnormal lab result, we will notify you by phone as soon as possible.  Submit refill requests through [employer] or call your pharmacy and they will forward the refill request to us. Please allow 3 business days for your refill to be completed.          Additional Information About Your Visit        [employer] Information     [employer] gives you secure access to your electronic health record. If you see a primary care provider, you can also send messages to your care team and make appointments. If you have questions, please call your primary care clinic.  If you do not have a primary care provider, please call [phone] and they will assist you.        Care EveryWhere ID     This is your Care EveryWhere ID. This could be used by other organizations to access your [facility] medical records  [id number]         Blood Pressure from Last 3 Encounters:   04/28/17 102/70   03/11/17 100/83   08/05/16 101/65    Weight from Last 3 Encounters:   04/28/17 179 lb (81.2 kg)   08/05/16 174 lb (78.9 kg)   07/01/16 176 lb (79.8 kg)              We Performed the Following     Allergy Shot: One injection        Primary Care Provider Office Phone # Fax #    Go Villarreal [phone]       [facility] [address] [facility] [address]        Thank you!     Thank you for choosing [facility]  for your care. Our goal is always to provide you with excellent care. Hearing back from our patients is one way we can continue to improve our services. Please take a few minutes to complete the written survey that you may receive in the mail after your visit with us. Thank you!             Your Updated Medication List - Protect others  around you: Learn how to safely use, store and throw away your medicines at www.disposemymeds.org.          This list is accurate as of: 6/16/17 10:30 AM.  Always use your most recent med list.                   Brand Name Dispense Instructions for use    albuterol 108 (90 BASE) MCG/ACT Inhaler    albuterol    1 Inhaler    Inhale 1 puff into the lungs 3 times daily For 3-5 days.       ALLERGEN IMMUNOTHERAPY PRESCRIPTION     5 mL    Proceed with SCIT per standard buildup protocol.       azelastine-fluticasone 137-50 MCG/ACT nasal spray    DYMISTA    2 Bottle    Spray 1 spray into both nostrils 2 times daily       dexlansoprazole 60 MG Cpdr CR capsule    DEXILANT     Take 60 mg by mouth daily       * EPINEPHrine 0.3 MG/0.3ML injection    EPIPEN    2 each    Inject 0.3 mLs into the muscle once as needed for anaphylaxis for 1 dose.       * EPINEPHrine 0.3 MG/0.3ML injection     6 mL    Inject 0.3 mLs (0.3 mg) into the muscle once as needed       fluticasone 50 MCG/ACT spray    FLONASE    16 g    Spray 2 sprays into both nostrils daily       loratadine 10 MG tablet    CLARITIN    30 tablet    Take 1 tablet (10 mg) by mouth daily       montelukast 10 MG tablet    SINGULAIR    30 tablet    Take 1 tablet (10 mg) by mouth At Bedtime       multivitamin, therapeutic with minerals Tabs tablet      Take 1 tablet by mouth daily       vitamin D 32668 UNIT capsule    ERGOCALCIFEROL     Take 50,000 Units by mouth once a week       * Notice:  This list has 2 medication(s) that are the same as other medications prescribed for you. Read the directions carefully, and ask your doctor or other care provider to review them with you.

## 2017-06-16 NOTE — NURSING NOTE
Safety Vial Test given in the Right Arm. SVT=9mm. Passed.  Allergy injection/s given and charted on paper allergy flow sheet.  Patient experienced no reaction.  Patrizia Cool

## 2017-06-23 ENCOUNTER — ALLIED HEALTH/NURSE VISIT (OUTPATIENT)
Dept: ALLERGY | Facility: OTHER | Age: 37
End: 2017-06-23
Attending: PHYSICIAN ASSISTANT
Payer: COMMERCIAL

## 2017-06-23 DIAGNOSIS — J30.9 ALLERGIC RHINITIS, UNSPECIFIED: Primary | ICD-10-CM

## 2017-06-23 PROCEDURE — 95115 IMMUNOTHERAPY ONE INJECTION: CPT

## 2017-06-23 NOTE — MR AVS SNAPSHOT
After Visit Summary   6/23/2017    Sydni Isabel    MRN: [id number]           Patient Information     Date Of Birth          1980        Visit Information        Provider Department      6/23/2017 10:30 AM HC SHOT ROOM [facility] [place]        Today's Diagnoses     Allergic rhinitis, unspecified    -  1       Follow-ups after your visit        Your next 10 appointments already scheduled     Jun 30, 2017 10:30 AM CDT   injection with HC SHOT ROOM   [facility] [place] ([facility] [place] )    [address]  [place] [address]   [phone]            Jul 07, 2017 10:30 AM CDT   injection with HC SHOT ROOM   [facility] [place] ([facility] [place] )    [address]  [place] [address]   [phone]            Jul 10, 2017  1:15 PM CDT   (Arrive by 1:00 PM)   Return Visit with Yulisa Quiroz MD   [facility] [place] ([facility] [place] [facility]    [address]  [place] [address]   [phone]            Jul 14, 2017 10:30 AM CDT   injection with HC SHOT ROOM   [facility] [place] ([facility] [place] )    [address]  [place] [address]   [phone]            Jul 21, 2017 10:30 AM CDT   injection with HC SHOT ROOM   [facility] [place] ([facility] [place] )    [address]  [place] [address]   [phone]            Jul 28, 2017 10:30 AM CDT   injection with HC SHOT ROOM   [facility] [place] ([facility] [place] )    [address]  [place] [address]   [phone]              Who to contact     If you have questions or need follow up information about today's clinic visit or your schedule [facility] [phone].  Normal or non-critical lab and imaging results will be communicated to you by MyChart, letter or phone within 4 business days after the clinic has received the results. If you do not hear from us within  7 days, please contact the clinic through [employer] or phone. If you have a critical or abnormal lab result, we will notify you by phone as soon as possible.  Submit refill requests through [employer] or call your pharmacy and they will forward the refill request to us. Please allow 3 business days for your refill to be completed.          Additional Information About Your Visit        [employer] Information     [employer] gives you secure access to your electronic health record. If you see a primary care provider, you can also send messages to your care team and make appointments. If you have questions, please call your primary care clinic.  If you do not have a primary care provider, please call [phone] and they will assist you.        Care EveryWhere ID     This is your Care EveryWhere ID. This could be used by other organizations to access your [place] medical records  [id number]         Blood Pressure from Last 3 Encounters:   04/28/17 102/70   03/11/17 100/83   08/05/16 101/65    Weight from Last 3 Encounters:   04/28/17 179 lb (81.2 kg)   08/05/16 174 lb (78.9 kg)   07/01/16 176 lb (79.8 kg)              We Performed the Following     Allergy Shot: One injection        Primary Care Provider Office Phone # Fax #    Go Villarreal [phone]       [facility] [address]  [facility] [address]        Equal Access to Services     [facility]: Hadii aad ku hadasho Soomaali, waaxda luqadaha, qaybta kaalmada adeegyada, susanna maier . So [facility] [phone].    ATENCIÓN: Si habla español, tiene a suero disposición servicios gratuitos de asistencia lingüística. Letty al [phone].    We comply with applicable federal civil rights laws and [place] laws. We do not discriminate on the basis of race, color, national origin, age, disability sex, sexual orientation or gender identity.            Thank you!     Thank you for choosing [facility]  for your  care. Our goal is always to provide you with excellent care. Hearing back from our patients is one way we can continue to improve our services. Please take a few minutes to complete the written survey that you may receive in the mail after your visit with us. Thank you!             Your Updated Medication List - Protect others around you: Learn how to safely use, store and throw away your medicines at www.disposemymeds.org.          This list is accurate as of: 6/23/17 10:49 AM.  Always use your most recent med list.                   Brand Name Dispense Instructions for use Diagnosis    albuterol 108 (90 BASE) MCG/ACT Inhaler    albuterol    1 Inhaler    Inhale 1 puff into the lungs 3 times daily For 3-5 days.        ALLERGEN IMMUNOTHERAPY PRESCRIPTION     5 mL    Proceed with SCIT per standard buildup protocol.    Perennial allergic rhinitis       azelastine-fluticasone 137-50 MCG/ACT nasal spray    DYMISTA    2 Bottle    Spray 1 spray into both nostrils 2 times daily    Perennial allergic rhinitis with seasonal variation, Seasonal allergic rhinitis, unspecified allergic rhinitis trigger       dexlansoprazole 60 MG Cpdr CR capsule    DEXILANT     Take 60 mg by mouth daily        * EPINEPHrine 0.3 MG/0.3ML injection    EPIPEN    2 each    Inject 0.3 mLs into the muscle once as needed for anaphylaxis for 1 dose.    Chronic rhinitis       * EPINEPHrine 0.3 MG/0.3ML injection     6 mL    Inject 0.3 mLs (0.3 mg) into the muscle once as needed    Allergic reaction, subsequent encounter       fluticasone 50 MCG/ACT spray    FLONASE    16 g    Spray 2 sprays into both nostrils daily    Chronic rhinitis       loratadine 10 MG tablet    CLARITIN    30 tablet    Take 1 tablet (10 mg) by mouth daily    Allergic rhinitis due to pollen, Seasonal allergic rhinitis, Perennial allergic rhinitis with seasonal variation       montelukast 10 MG tablet    SINGULAIR    30 tablet    Take 1 tablet (10 mg) by mouth At Bedtime    Perennial  allergic rhinitis with seasonal variation, Seasonal allergic rhinitis, Allergic rhinitis due to pollen       multivitamin, therapeutic with minerals Tabs tablet      Take 1 tablet by mouth daily        vitamin D 39754 UNIT capsule    ERGOCALCIFEROL     Take 50,000 Units by mouth once a week        * Notice:  This list has 2 medication(s) that are the same as other medications prescribed for you. Read the directions carefully, and ask your doctor or other care provider to review them with you.

## 2017-06-23 NOTE — PROGRESS NOTES
.Prior to injection verified patient identity using patient's name and date of birth.    Per orders of Martina Dominguez, injection of allergy serum given by Jeniffer Alves. Patient instructed to remain in clinic for 20 minutes afterwards, and to report any adverse reaction to me immediately.    Allergy injection/s given and charted on paper allergy flow sheet.  Patient experienced no reaction.    Jeniffer Alves LPN

## 2017-06-29 DIAGNOSIS — S16.1XXA CERVICAL STRAIN: ICD-10-CM

## 2017-06-29 DIAGNOSIS — M54.10 RADICULAR PAIN: ICD-10-CM

## 2017-06-29 DIAGNOSIS — M54.2 CERVICAL PAIN: Primary | ICD-10-CM

## 2017-06-30 ENCOUNTER — ALLIED HEALTH/NURSE VISIT (OUTPATIENT)
Dept: ALLERGY | Facility: OTHER | Age: 37
End: 2017-06-30
Attending: PHYSICIAN ASSISTANT
Payer: COMMERCIAL

## 2017-06-30 DIAGNOSIS — J30.9 ALLERGIC RHINITIS, UNSPECIFIED: Primary | ICD-10-CM

## 2017-06-30 PROCEDURE — 95115 IMMUNOTHERAPY ONE INJECTION: CPT

## 2017-06-30 NOTE — PROGRESS NOTES
Prior to injection verified patient identity using patient's name and date of birth.    Per orders of Martina Dominguez, injection of allergy serum given by Jeniffer Alves. Patient instructed to remain in clinic for 20 minutes afterwards, and to report any adverse reaction to me immediately.    Allergy injection/s given and charted on paper allergy flow sheet.      Patient signed out AMA and did not remain for observation.     Jeniffer Alves LPN

## 2017-06-30 NOTE — MR AVS SNAPSHOT
After Visit Summary   6/30/2017    Sydni Isabel    MRN: [id number]           Patient Information     Date Of Birth          1980        Visit Information        Provider Department      6/30/2017 10:30 AM HC SHOT ROOM [facility] [place]        Today's Diagnoses     Allergic rhinitis, unspecified    -  1       Follow-ups after your visit        Your next 10 appointments already scheduled     Jul 03, 2017  8:00 AM CDT   Radiology with [facility] MRI   [facility] ([facility] )    [address]  [place] [address]   [phone]            Jul 07, 2017 10:30 AM CDT   injection with HC SHOT ROOM   [facility] [place] ([facility] [place] )    [address]  [place] [address]   [phone]            Jul 10, 2017  1:15 PM CDT   (Arrive by 1:00 PM)   Return Visit with Yulisa Quiroz MD   [facility] [place] ([facility] [place] )    [address]  [place] [address]   [phone]            Jul 14, 2017 10:30 AM CDT   injection with HC SHOT ROOM   [facility] [place] ([facility] [place] )    [address]  [place] [address]   [phone]            Jul 21, 2017 10:30 AM CDT   injection with HC SHOT ROOM   [facility] [place] ([facility] [place] )    [address]  [place] [address]   [phone]            Jul 28, 2017 10:30 AM CDT   injection with HC SHOT ROOM   [facility] [place] ([facility] [place] )    [address]  [place] [address]   [phone]              Who to contact     If you have questions or need follow up information about today's clinic visit or your schedule please contact [facility] directly at [phone].  Normal or non-critical lab and imaging results will be communicated to you by MyChart, letter or phone within 4 business days after the clinic has received the results. If you do not hear from us within 7 days, please contact the  clinic through One Season or phone. If you have a critical or abnormal lab result, we will notify you by phone as soon as possible.  Submit refill requests through One Season or call your pharmacy and they will forward the refill request to us. Please allow 3 business days for your refill to be completed.          Additional Information About Your Visit        [email or web address] Information     One Season gives you secure access to your electronic health record. If you see a primary care provider, you can also send messages to your care team and make appointments. If you have questions, please call your primary care clinic.  If you do not have a primary care provider, please call [phone] and they will assist you.        Care EveryWhere ID     This is your Care EveryWhere ID. This could be used by other organizations to access your [place] medical records  [id number]         Blood Pressure from Last 3 Encounters:   04/28/17 102/70   03/11/17 100/83   08/05/16 101/65    Weight from Last 3 Encounters:   04/28/17 179 lb (81.2 kg)   08/05/16 174 lb (78.9 kg)   07/01/16 176 lb (79.8 kg)              We Performed the Following     Allergy Shot: One injection        Primary Care Provider Office Phone # Fax #    Go Villarreal [phone]       Count includes the [facility] [address] [facility] [address]        Equal Access to Services     [facility] MARGY : Hadii aad ku hadasho Soomaali, waaxda luqadaha, qaybta kaalmada adeegyada, susanna maier . So [facility] [phone].    ATENCIÓN: Si habla español, tiene a suero disposición servicios gratuitos de asistencia lingüística. Letty al [phone].    We comply with applicable federal civil rights laws and [place] laws. We do not discriminate on the basis of race, color, national origin, age, disability sex, sexual orientation or gender identity.            Thank you!     Thank you for choosing [facility]  for your care. Our goal is always to  provide you with excellent care. Hearing back from our patients is one way we can continue to improve our services. Please take a few minutes to complete the written survey that you may receive in the mail after your visit with us. Thank you!             Your Updated Medication List - Protect others around you: Learn how to safely use, store and throw away your medicines at www.disposemymeds.org.          This list is accurate as of: 6/30/17 10:40 AM.  Always use your most recent med list.                   Brand Name Dispense Instructions for use Diagnosis    albuterol 108 (90 BASE) MCG/ACT Inhaler    albuterol    1 Inhaler    Inhale 1 puff into the lungs 3 times daily For 3-5 days.        ALLERGEN IMMUNOTHERAPY PRESCRIPTION     5 mL    Proceed with SCIT per standard buildup protocol.    Perennial allergic rhinitis       azelastine-fluticasone 137-50 MCG/ACT nasal spray    DYMISTA    2 Bottle    Spray 1 spray into both nostrils 2 times daily    Perennial allergic rhinitis with seasonal variation, Seasonal allergic rhinitis, unspecified allergic rhinitis trigger       dexlansoprazole 60 MG Cpdr CR capsule    DEXILANT     Take 60 mg by mouth daily        * EPINEPHrine 0.3 MG/0.3ML injection    EPIPEN    2 each    Inject 0.3 mLs into the muscle once as needed for anaphylaxis for 1 dose.    Chronic rhinitis       * EPINEPHrine 0.3 MG/0.3ML injection     6 mL    Inject 0.3 mLs (0.3 mg) into the muscle once as needed    Allergic reaction, subsequent encounter       fluticasone 50 MCG/ACT spray    FLONASE    16 g    Spray 2 sprays into both nostrils daily    Chronic rhinitis       loratadine 10 MG tablet    CLARITIN    30 tablet    Take 1 tablet (10 mg) by mouth daily    Allergic rhinitis due to pollen, Seasonal allergic rhinitis, Perennial allergic rhinitis with seasonal variation       montelukast 10 MG tablet    SINGULAIR    30 tablet    Take 1 tablet (10 mg) by mouth At Bedtime    Perennial allergic rhinitis with  seasonal variation, Seasonal allergic rhinitis, Allergic rhinitis due to pollen       multivitamin, therapeutic with minerals Tabs tablet      Take 1 tablet by mouth daily        vitamin D 73253 UNIT capsule    ERGOCALCIFEROL     Take 50,000 Units by mouth once a week        * Notice:  This list has 2 medication(s) that are the same as other medications prescribed for you. Read the directions carefully, and ask your doctor or other care provider to review them with you.

## 2017-07-03 ENCOUNTER — HOSPITAL ENCOUNTER (OUTPATIENT)
Dept: MRI IMAGING | Facility: HOSPITAL | Age: 37
Discharge: HOME | End: 2017-07-03
Attending: FAMILY MEDICINE | Admitting: FAMILY MEDICINE
Payer: COMMERCIAL

## 2017-07-03 PROCEDURE — 72141 MRI NECK SPINE W/O DYE: CPT | Mod: TC

## 2017-07-04 ENCOUNTER — HOSPITAL ENCOUNTER (EMERGENCY)
Facility: HOSPITAL | Age: 37
Discharge: HOME | End: 2017-07-04
Attending: PHYSICIAN ASSISTANT | Admitting: PHYSICIAN ASSISTANT
Payer: COMMERCIAL

## 2017-07-04 VITALS
RESPIRATION RATE: 16 BRPM | SYSTOLIC BLOOD PRESSURE: 113 MMHG | TEMPERATURE: 96.7 F | DIASTOLIC BLOOD PRESSURE: 74 MMHG | OXYGEN SATURATION: 99 %

## 2017-07-04 DIAGNOSIS — J03.90 EXUDATIVE TONSILLITIS: ICD-10-CM

## 2017-07-04 LAB
DEPRECATED S PYO AG THROAT QL EIA: NORMAL
MICRO REPORT STATUS: NORMAL
SPECIMEN SOURCE: NORMAL

## 2017-07-04 PROCEDURE — 87880 STREP A ASSAY W/OPTIC: CPT | Performed by: FAMILY MEDICINE

## 2017-07-04 PROCEDURE — 99213 OFFICE O/P EST LOW 20 MIN: CPT | Performed by: PHYSICIAN ASSISTANT

## 2017-07-04 PROCEDURE — 87081 CULTURE SCREEN ONLY: CPT | Performed by: FAMILY MEDICINE

## 2017-07-04 PROCEDURE — 99213 OFFICE O/P EST LOW 20 MIN: CPT

## 2017-07-04 NOTE — ED NOTES
Ambulated into . C/0 bad sore throat into the ears bilateral along with headache. Believes there are white spots in the back of the throat. No medications prior to visit. Pain 6 out of 10.

## 2017-07-04 NOTE — ED AVS SNAPSHOT
[facility]    [address] [address]    Phone:  [phone]                                       Sydni Isabel   MRN: [id number]    Department:  [facility]   Date of Visit:  7/4/2017           After Visit Summary Signature Page     I have received my discharge instructions, and my questions have been answered. I have discussed any challenges I see with this plan with the nurse or doctor.    ..........................................................................................................................................  Patient/Patient Representative Signature      ..........................................................................................................................................  Patient Representative Print Name and Relationship to Patient    ..................................................               ................................................  Date                                            Time    ..........................................................................................................................................  Reviewed by Signature/Title    ...................................................              ..............................................  Date                                                            Time

## 2017-07-04 NOTE — ED AVS SNAPSHOT
[facility]    [address] [address]    Phone:  [phone]                                       Sydni Isabel   MRN: [id number]    Department:  [facility]   Date of Visit:  7/4/2017           Patient Information     Date Of Birth          1980        Your diagnoses for this visit were:     Exudative tonsillitis Rapid strep negative  Culture pending       You were seen by Puja Duong PA.      Follow-up Information     Follow up with Go Villarreal MD.    Specialty:  Family Practice    Why:  If symptoms worsen    Contact information:    [facility]  [address] [facility] [address] [phone]          Follow up with [facility].    Specialty:  EMERGENCY MEDICINE    Why:  If further concerns develop    Contact information:    [address] [address] [phone]    Additional information:    From [place]: Take [address]. Turn left at [address]. Turn left at the first stoplight on [address]. At the first stop sign, take a right onto [address]. Take a left into the parking lot and continue through until you reach the North enterance of the building.       From [place]: Take [address]. Take the [address] ramp towards [place]. Turn left onto [address]. Take a slight right onto [address]. Turn left at the first stoplight on [address]. At the first stop sign, take a right onto [address]. Take a left into the parking lot and continue through until you reach the North enterance of the building.       From [place]: Take [address]. Take a right at [address]. At the first stop sign, take a right onto [address]. Take a left into the parking lot and continue through until you reach the North enterance of the building.       Discharge References/Attachments     PHARYNGITIS, REPORT PENDING (ENGLISH)    SORE THROATS,  SELF-CARE FOR (ENGLISH)      Future Appointments        Provider Department Dept Phone Center    7/7/2017 10:30 AM HC SHOT ROOM [facility] [place] [phone] [place]    7/10/2017 1:15 PM Yulisa Quiroz MD [facility] [place] [phone] [place]    7/14/2017 10:30 AM HC SHOT ROOM [facility] [place] [phone] [place]    7/21/2017 10:30 AM HC SHOT ROOM [facility] [place] [phone] [place]    7/28/2017 10:30 AM HC SHOT ROOM [facility] [place] [phone] [place]         Review of your medicines      Our records show that you are taking the medicines listed below. If these are incorrect, please call your family doctor or clinic.        Dose / Directions Last dose taken    albuterol 108 (90 BASE) MCG/ACT Inhaler   Commonly known as:  albuterol   Dose:  1 puff   Quantity:  1 Inhaler        Inhale 1 puff into the lungs 3 times daily For 3-5 days.   Refills:  0        ALLERGEN IMMUNOTHERAPY PRESCRIPTION   Quantity:  5 mL        Proceed with SCIT per standard buildup protocol.   Refills:  PRN        azelastine-fluticasone 137-50 MCG/ACT nasal spray   Commonly known as:  DYMISTA   Dose:  1 spray   Quantity:  2 Bottle        Spray 1 spray into both nostrils 2 times daily   Refills:  3        dexlansoprazole 60 MG Cpdr CR capsule   Commonly known as:  DEXILANT   Dose:  60 mg        Take 60 mg by mouth daily   Refills:  0        * EPINEPHrine 0.3 MG/0.3ML injection   Commonly known as:  EPIPEN   Dose:  0.3 mg   Quantity:  2 each        Inject 0.3 mLs into the muscle once as needed for anaphylaxis for 1 dose.   Refills:  3        * EPINEPHrine 0.3 MG/0.3ML injection   Dose:  0.3 mg   Quantity:  6 mL        Inject 0.3 mLs (0.3 mg) into the muscle once as needed   Refills:  1        fluticasone 50 MCG/ACT spray   Commonly known as:  FLONASE   Dose:  2 spray   Quantity:  16 g        Spray 2 sprays into both nostrils daily   Refills:  11        loratadine 10 MG  tablet   Commonly known as:  CLARITIN   Dose:  10 mg   Quantity:  30 tablet        Take 1 tablet (10 mg) by mouth daily   Refills:  11        montelukast 10 MG tablet   Commonly known as:  SINGULAIR   Dose:  10 mg   Indication:  Perennial Rhinitis   Quantity:  30 tablet        Take 1 tablet (10 mg) by mouth At Bedtime   Refills:  11        multivitamin, therapeutic with minerals Tabs tablet   Dose:  1 tablet        Take 1 tablet by mouth daily   Refills:  0        vitamin D 15569 UNIT capsule   Commonly known as:  ERGOCALCIFEROL   Dose:  05977 Units        Take 50,000 Units by mouth once a week   Refills:  0        * Notice:  This list has 2 medication(s) that are the same as other medications prescribed for you. Read the directions carefully, and ask your doctor or other care provider to review them with you.            Procedures and tests performed during your visit     Beta strep group A culture    Rapid strep screen      Orders Needing Specimen Collection     None      Pending Results     Date and Time Order Name Status Description    7/4/2017 0136 Beta strep group A culture In process             Pending Culture Results     Date and Time Order Name Status Description    7/4/2017 0136 Beta strep group A culture In process             Thank you for choosing [facility]       Thank you for choosing [facility] for your care. Our goal is always to provide you with excellent care. Hearing back from our patients is one way we can continue to improve our services. Please take a few minutes to complete the written survey that you may receive in the mail after you visit with us. Thank you!        Sionic Mobilehart Information     Kluster gives you secure access to your electronic health record. If you see a primary care provider, you can also send messages to your care team and make appointments. If you have questions, please call your primary care clinic.  If you do not have a primary care provider, please call [phone] and they  will assist you.        Care EveryWhere ID     This is your Care EveryWhere ID. This could be used by other organizations to access your [place] medical records  [id number]        Equal Access to Services     HERMINIO JI : Nash Cordova, roberto vazquez, rogelio bernal, susanna monahan. So [facility] [phone].    ATENCIÓN: Si habla español, tiene a suero disposición servicios gratuitos de asistencia lingüística. Llame al [phone].    We comply with applicable federal civil rights laws and [place] laws. We do not discriminate on the basis of race, color, national origin, age, disability sex, sexual orientation or gender identity.            After Visit Summary       This is your record. Keep this with you and show to your community pharmacist(s) and doctor(s) at your next visit.

## 2017-07-06 LAB
BACTERIA SPEC CULT: NORMAL
MICRO REPORT STATUS: NORMAL
SPECIMEN SOURCE: NORMAL

## 2017-07-07 ENCOUNTER — ALLIED HEALTH/NURSE VISIT (OUTPATIENT)
Dept: ALLERGY | Facility: OTHER | Age: 37
End: 2017-07-07
Attending: PHYSICIAN ASSISTANT
Payer: COMMERCIAL

## 2017-07-07 DIAGNOSIS — J30.9 ALLERGIC RHINITIS, UNSPECIFIED: Primary | ICD-10-CM

## 2017-07-07 PROCEDURE — 95115 IMMUNOTHERAPY ONE INJECTION: CPT

## 2017-07-07 ASSESSMENT — ENCOUNTER SYMPTOMS
CARDIOVASCULAR NEGATIVE: 1
TROUBLE SWALLOWING: 0
FATIGUE: 1
APPETITE CHANGE: 1
NECK STIFFNESS: 0
LIGHT-HEADEDNESS: 0
PSYCHIATRIC NEGATIVE: 1
SINUS PRESSURE: 0
NAUSEA: 0
EYE REDNESS: 0
EYE DISCHARGE: 0
VOMITING: 0
HEADACHES: 1
DIZZINESS: 0
SORE THROAT: 1
COUGH: 0
DIARRHEA: 0
ABDOMINAL PAIN: 0
FEVER: 0
NECK PAIN: 0
VOICE CHANGE: 0

## 2017-07-07 NOTE — MR AVS SNAPSHOT
After Visit Summary   7/7/2017    Sydni Isabel    MRN: [id number]           Patient Information     Date Of Birth          1980        Visit Information        Provider Department      7/7/2017 10:30 AM HC SHOT ROOM [facility] [place]        Today's Diagnoses     Allergic rhinitis, unspecified    -  1       Follow-ups after your visit        Your next 10 appointments already scheduled     Jul 10, 2017  1:15 PM CDT   (Arrive by 1:00 PM)   Return Visit with Yulisa Quiroz MD   [facility] [place] ([facility] [place] )    [address]  [place] [address]   [phone]            Jul 14, 2017 10:30 AM CDT   injection with HC SHOT ROOM   [facility] [place] ([facility] [place] )    [address]  [place] [address]   [phone]            Jul 21, 2017 10:30 AM CDT   injection with HC SHOT ROOM   [facility] [place] ([facility] [place] )    [address]  [place] [address]   [phone]            Jul 28, 2017 10:30 AM CDT   injection with HC SHOT ROOM   [facility] [place] ([facility] [place] )    [address]  [place] [address]   [phone]              Who to contact     If you have questions or need follow up information about today's clinic visit or your schedule please contact [facility] directly at [phone].  Normal or non-critical lab and imaging results will be communicated to you by MyChart, letter or phone within 4 business days after the clinic has received the results. If you do not hear from us within 7 days, please contact the clinic through MyChart or phone. If you have a critical or abnormal lab result, we will notify you by phone as soon as possible.  Submit refill requests through BringShare or call your pharmacy and they will forward the refill request to us. Please allow 3 business days for your refill to be completed.          Additional Information About Your  Visit        MyChart Information     InSupplyhart gives you secure access to your electronic health record. If you see a primary care provider, you can also send messages to your care team and make appointments. If you have questions, please call your primary care clinic.  If you do not have a primary care provider, please call [phone] and they will assist you.        Care EveryWhere ID     This is your Care EveryWhere ID. This could be used by other organizations to access your [place] medical records  [id number]         Blood Pressure from Last 3 Encounters:   07/04/17 113/74   04/28/17 102/70   03/11/17 100/83    Weight from Last 3 Encounters:   04/28/17 179 lb (81.2 kg)   08/05/16 174 lb (78.9 kg)   07/01/16 176 lb (79.8 kg)              We Performed the Following     Allergy Shot: One injection        Primary Care Provider Office Phone # Fax #    Go Villarreal [phone]       [facility] [address] [facility] [address]        Equal Access to Services     [facility]: Hadii aad ku hadasho Soomaali, waaxda luqadaha, qaybta kaalmada adeegyada, waxay sanderin haylinda maier . So [facility] [phone].    ATENCIÓN: Si habla español, tiene a suero disposición servicios gratuitos de asistencia lingüística. Llame al [phone].    We comply with applicable federal civil rights laws and [place] laws. We do not discriminate on the basis of race, color, national origin, age, disability sex, sexual orientation or gender identity.            Thank you!     Thank you for choosing [facility]  for your care. Our goal is always to provide you with excellent care. Hearing back from our patients is one way we can continue to improve our services. Please take a few minutes to complete the written survey that you may receive in the mail after your visit with us. Thank you!             Your Updated Medication List - Protect others around you: Learn how to safely use, store  and throw away your medicines at www.disposemymeds.org.          This list is accurate as of: 7/7/17 10:54 AM.  Always use your most recent med list.                   Brand Name Dispense Instructions for use Diagnosis    albuterol 108 (90 BASE) MCG/ACT Inhaler    albuterol    1 Inhaler    Inhale 1 puff into the lungs 3 times daily For 3-5 days.        ALLERGEN IMMUNOTHERAPY PRESCRIPTION     5 mL    Proceed with SCIT per standard buildup protocol.    Perennial allergic rhinitis       azelastine-fluticasone 137-50 MCG/ACT nasal spray    DYMISTA    2 Bottle    Spray 1 spray into both nostrils 2 times daily    Perennial allergic rhinitis with seasonal variation, Seasonal allergic rhinitis, unspecified allergic rhinitis trigger       dexlansoprazole 60 MG Cpdr CR capsule    DEXILANT     Take 60 mg by mouth daily        * EPINEPHrine 0.3 MG/0.3ML injection    EPIPEN    2 each    Inject 0.3 mLs into the muscle once as needed for anaphylaxis for 1 dose.    Chronic rhinitis       * EPINEPHrine 0.3 MG/0.3ML injection     6 mL    Inject 0.3 mLs (0.3 mg) into the muscle once as needed    Allergic reaction, subsequent encounter       fluticasone 50 MCG/ACT spray    FLONASE    16 g    Spray 2 sprays into both nostrils daily    Chronic rhinitis       loratadine 10 MG tablet    CLARITIN    30 tablet    Take 1 tablet (10 mg) by mouth daily    Allergic rhinitis due to pollen, Seasonal allergic rhinitis, Perennial allergic rhinitis with seasonal variation       montelukast 10 MG tablet    SINGULAIR    30 tablet    Take 1 tablet (10 mg) by mouth At Bedtime    Perennial allergic rhinitis with seasonal variation, Seasonal allergic rhinitis, Allergic rhinitis due to pollen       multivitamin, therapeutic with minerals Tabs tablet      Take 1 tablet by mouth daily        vitamin D 28482 UNIT capsule    ERGOCALCIFEROL     Take 50,000 Units by mouth once a week        * Notice:  This list has 2 medication(s) that are the same as other  medications prescribed for you. Read the directions carefully, and ask your doctor or other care provider to review them with you.

## 2017-07-07 NOTE — ED PROVIDER NOTES
History     Chief Complaint   Patient presents with     Pharyngitis     x 2 days     The history is provided by the patient. No  was used.     Sydni Isabel is a 36 year old female who has 2 days of sore throat, headache, bilateral ear pain and white spots in her throat. Has decreased energy/appetite.  No n/v/d/f/c    I have reviewed the Medications, Allergies, Past Medical and Surgical History, and Social History in the Epic system.    Allergies: No Known Allergies      No current facility-administered medications on file prior to encounter.   Current Outpatient Prescriptions on File Prior to Encounter:  ORDER FOR ALLERGEN IMMUNOTHERAPY Proceed with SCIT per standard buildup protocol.   dexlansoprazole (DEXILANT) 60 MG CPDR CR capsule Take 60 mg by mouth daily   azelastine-fluticasone (DYMISTA) 137-50 MCG/ACT nasal spray Spray 1 spray into both nostrils 2 times daily   montelukast (SINGULAIR) 10 MG tablet Take 1 tablet (10 mg) by mouth At Bedtime   loratadine (CLARITIN) 10 MG tablet Take 1 tablet (10 mg) by mouth daily   fluticasone (FLONASE) 50 MCG/ACT nasal spray Spray 2 sprays into both nostrils daily   multivitamin, therapeutic with minerals (THERA-VIT-M) TABS Take 1 tablet by mouth daily   albuterol (ALBUTEROL) 108 (90 BASE) MCG/ACT inhaler Inhale 1 puff into the lungs 3 times daily For 3-5 days.   vitamin D (ERGOCALCIFEROL) 03328 UNIT capsule Take 50,000 Units by mouth once a week    EPINEPHrine 0.3 MG/0.3ML injection Inject 0.3 mLs (0.3 mg) into the muscle once as needed   EPINEPHrine (EPIPEN) 0.3 MG/0.3ML injection Inject 0.3 mLs into the muscle once as needed for anaphylaxis for 1 dose.       Patient Active Problem List   Diagnosis     Threatened      ACP (advance care planning)     Allergic rhinitis       Past Surgical History:   Procedure Laterality Date     c section times 2       DILATION AND CURETTAGE SUCTION  2014    Procedure: DILATION AND CURETTAGE SUCTION;   "Surgeon: Oliver Webb MD;  Location: [facility] OR     ENT SURGERY      sinus     ESOPHAGOSCOPY, GASTROSCOPY, DUODENOSCOPY (EGD), COMBINED N/A 1/8/2016    Procedure: COMBINED ESOPHAGOSCOPY, GASTROSCOPY, DUODENOSCOPY (EGD);  Surgeon: Francois Gordon MD;  Location: [facility] OR     GYN SURGERY  2000    d & c     wisdom teeth         Social History   Substance Use Topics     Smoking status: Never Smoker     Smokeless tobacco: Never Used     Alcohol use No         There is no immunization history on file for this patient.    BMI: Estimated body mass index is 28.89 kg/(m^2) as calculated from the following:    Height as of 4/28/17: 1.676 m (5' 6\").    Weight as of 4/28/17: 81.2 kg (179 lb).      Review of Systems   Constitutional: Positive for appetite change and fatigue. Negative for fever.   HENT: Positive for ear pain and sore throat. Negative for congestion, sinus pressure, trouble swallowing and voice change.    Eyes: Negative for discharge and redness.   Respiratory: Negative for cough.    Cardiovascular: Negative.    Gastrointestinal: Negative for abdominal pain, diarrhea, nausea and vomiting.   Genitourinary: Negative.    Musculoskeletal: Negative for neck pain and neck stiffness.   Skin: Negative for rash.   Neurological: Positive for headaches. Negative for dizziness and light-headedness.   Psychiatric/Behavioral: Negative.        Physical Exam   BP: 113/74  Heart Rate: 81  Temp: 96.7  F (35.9  C)  Resp: 16  SpO2: 99 %  Physical Exam   Constitutional: She is oriented to person, place, and time. She appears well-developed and well-nourished. No distress.   HENT:   Head: Normocephalic and atraumatic.   Right Ear: External ear normal.   Left Ear: External ear normal.   Mouth/Throat: Oropharyngeal exudate (+1 edema/erythema) present.   Bilateral TMs/canals clear/wnl  No sinus TTP     Eyes: Conjunctivae and EOM are normal. Right eye exhibits no discharge. Left eye exhibits no discharge.   Neck: Normal range of motion. Neck supple. "   Cardiovascular: Normal rate, regular rhythm and normal heart sounds.    Pulmonary/Chest: Effort normal and breath sounds normal. No respiratory distress.   Abdominal: Soft. Bowel sounds are normal. She exhibits no distension. There is no tenderness.   Neurological: She is alert and oriented to person, place, and time.   Skin: Skin is warm and dry. No rash noted. She is not diaphoretic.   Psychiatric: She has a normal mood and affect.   Nursing note and vitals reviewed.      ED Course     ED Course     Procedures        Labs Ordered and Resulted from Time of ED Arrival Up to the Time of Departure from the ED   RAPID STREP SCREEN       Assessments & Plan (with Medical Decision Making)     I have reviewed the nursing notes.    I have reviewed the findings, diagnosis, plan and need for follow up with the patient.    Final diagnoses:   Exudative tonsillitis - Rapid strep negative  Culture pending         Patient verbally educated and given appropriate education sheets for each of the diagnoses and has no questions.  Take OTC motrin or tylenol as directed on the bottle as needed.  Take prescription medications as directed.  Increase fluids, wash hands often.    Follow up with your provider if symptoms increase or if concerns develop, return to the ER.  Puja Duong Certified   Physician Assistant  7/7/2017  6:48 PM  URGENT CARE CLINIC    7/4/2017   [facility]     Puja Duong PA  07/07/17 4338

## 2017-07-10 ENCOUNTER — OFFICE VISIT (OUTPATIENT)
Dept: OTOLARYNGOLOGY | Facility: OTHER | Age: 37
End: 2017-07-10
Attending: OTOLARYNGOLOGY
Payer: COMMERCIAL

## 2017-07-10 VITALS
SYSTOLIC BLOOD PRESSURE: 104 MMHG | BODY MASS INDEX: 27.97 KG/M2 | TEMPERATURE: 98.8 F | HEART RATE: 70 BPM | OXYGEN SATURATION: 98 % | HEIGHT: 66 IN | DIASTOLIC BLOOD PRESSURE: 62 MMHG | WEIGHT: 174 LBS

## 2017-07-10 DIAGNOSIS — J34.3 NASAL TURBINATE HYPERTROPHY: ICD-10-CM

## 2017-07-10 DIAGNOSIS — J30.89 PERENNIAL ALLERGIC RHINITIS WITH SEASONAL VARIATION: ICD-10-CM

## 2017-07-10 DIAGNOSIS — J32.1 CHRONIC FRONTAL SINUSITIS: ICD-10-CM

## 2017-07-10 DIAGNOSIS — J32.0 CHRONIC MAXILLARY SINUSITIS: ICD-10-CM

## 2017-07-10 DIAGNOSIS — J34.2 DNS (DEVIATED NASAL SEPTUM): Primary | ICD-10-CM

## 2017-07-10 DIAGNOSIS — J30.2 PERENNIAL ALLERGIC RHINITIS WITH SEASONAL VARIATION: ICD-10-CM

## 2017-07-10 PROCEDURE — 99214 OFFICE O/P EST MOD 30 MIN: CPT | Mod: 25 | Performed by: OTOLARYNGOLOGY

## 2017-07-10 PROCEDURE — 31231 NASAL ENDOSCOPY DX: CPT | Performed by: OTOLARYNGOLOGY

## 2017-07-10 PROCEDURE — 99212 OFFICE O/P EST SF 10 MIN: CPT | Mod: 25

## 2017-07-10 ASSESSMENT — PAIN SCALES - GENERAL: PAINLEVEL: NO PAIN (0)

## 2017-07-10 NOTE — NURSING NOTE
"Chief Complaint   Patient presents with     Consult     perennial allergic rhinitis, chronic pansinusitis, DNS- Ruud        Initial /62 (BP Location: Right arm, Patient Position: Chair, Cuff Size: Adult Regular)  Pulse 70  Temp 98.8  F (37.1  C) (Tympanic)  Ht 5' 6\" (1.676 m)  Wt 174 lb (78.9 kg)  SpO2 98%  BMI 28.08 kg/m2 Estimated body mass index is 28.08 kg/(m^2) as calculated from the following:    Height as of this encounter: 5' 6\" (1.676 m).    Weight as of this encounter: 174 lb (78.9 kg).  Medication Reconciliation: complete     Janel White LPN      "

## 2017-07-10 NOTE — MR AVS SNAPSHOT
After Visit Summary   7/10/2017    Sydni Isabel    MRN: [id number]           Patient Information     Date Of Birth          1980        Visit Information        Provider Department      7/10/2017 1:15 PM Yulisa Quiroz MD [facility] Instructions    Thank you for allowing Dr. Quiroz and our ENT team to participate in your care.  If you have a scheduling or an appointment question please contact [facility] Unit Coordinator at their direct line [phone].   ALL nursing questions or concerns can be directed to your ENT nurse at: [phone] - Patrizia    Continue Dymista  Continue Pa Med Saline Twice Daily  Follow up in surgery        HOW TO PREPARE-      You need to have a scheduled Pre-Op with your primary care physician within 30 days of your scheduled surgery. You should be set up with this before you leave today.       You need a friend or family member available to drive you home AND stay with you for 24 hours after you leave the hospital. You will not be allowed to drive yourself. IF you need to take a taxi or the bus you MUST have a responsible person to ride with you. YOUR PROCEDURE WILL BE CANCELLED IF YOU DO NOT HAVE A RESPONSIBLE ADULT TO DRIVE YOU HOME.       You CANNOT have anything to eat or drink after midnight the night before your surgery, including water and coffee. Your stomach needs to be completely empty. Do NOT chew gum, suck on hard candy, or smoke. You can brush your teeth the morning of surgery.       You need to call our Surgery Education Nurses 1-2 weeks prior to your surgery date at  [phone] or toll free [phone]. Please have your medication and allergy lists ready.      Stop your aspirin or other NSAIDs(Ibuprofen, Motrin, Aleve, Celebrex, Naproxen, etc...) 7 days before your surgery.      Hospital admitting will call you the day before your surgery with your exact arrival time.       Please call your primary care  physician if you should become ill within 24 hours of scheduled surgery. (ex.vomiting, diarrhea, fever)          You will need to wash the night before AND the morning of you procedure with a liquid antibacterial soap, like Dial.             Follow-ups after your visit        Your next 10 appointments already scheduled     Jul 14, 2017 10:30 AM CDT   injection with HC SHOT ROOM   [facility] [place] ([facility] [place] [facility] [place] )    [address]  [place] [address]   [phone]            Jul 21, 2017 10:30 AM CDT   injection with HC SHOT ROOM   [facility] [place] ([facility] [place] [facility] [place] )    [address]  [place] [address]   [phone]            Jul 28, 2017 10:30 AM CDT   injection with HC SHOT ROOM   [facility] [place] ([facility] [place] [facility] [place] )    [address]  [place] [address]   [phone]              Who to contact     If you have questions or need follow up information about today's clinic visit or your schedule please contact [facility]] directly at [phone].  Normal or non-critical lab and imaging results will be communicated to you by [employer], letter or phone within 4 business days after the clinic has received the results. If you do not hear from us within 7 days, please contact the clinic through [employer] or phone. If you have a critical or abnormal lab result, we will notify you by phone as soon as possible.  Submit refill requests through [employer] or call your pharmacy and they will forward the refill request to us. Please allow 3 business days for your refill to be completed.          Additional Information About Your Visit        [employer] Information     [employer] gives you secure access to your electronic health record. If you see a primary care provider, you can also send messages to your care team and make appointments. If you have questions, please call your primary care clinic.  If you do not have a primary care provider,  "please call [phone] and they will assist you.        Care EveryWhere ID     This is your Care EveryWhere ID. This could be used by other organizations to access your [facility] medical records  [id number]        Your Vitals Were     Pulse Temperature Height Pulse Oximetry BMI (Body Mass Index)       70 98.8  F (37.1  C) (Tympanic) 5' 6\" (1.676 m) 98% 28.08 kg/m2        Blood Pressure from Last 3 Encounters:   07/10/17 104/62   07/04/17 113/74   04/28/17 102/70    Weight from Last 3 Encounters:   07/10/17 174 lb (78.9 kg)   04/28/17 179 lb (81.2 kg)   08/05/16 174 lb (78.9 kg)              Today, you had the following     No orders found for display       Primary Care Provider Office Phone # Fax #    Go Villarreal [phone]       [facility] [address] [facility] [address]        Equal Access to Services     [facility]: Hadii alonzo ku hadasho Soomaali, waaxda luqadaha, qaybta kaalmada adeegyada, susanna maier . So [facility] [phone].    ATENCIÓN: Si habla español, tiene a suero disposición servicios gratuitos de asistencia lingüística. Llame al [phone].    We comply with applicable federal civil rights laws and [place] laws. We do not discriminate on the basis of race, color, national origin, age, disability sex, sexual orientation or gender identity.            Thank you!     Thank you for choosing [facility]  for your care. Our goal is always to provide you with excellent care. Hearing back from our patients is one way we can continue to improve our services. Please take a few minutes to complete the written survey that you may receive in the mail after your visit with us. Thank you!             Your Updated Medication List - Protect others around you: Learn how to safely use, store and throw away your medicines at www.disposemymeds.org.          This list is accurate as of: 7/10/17  1:41 PM.  Always use your most recent med list.    "                Brand Name Dispense Instructions for use Diagnosis    albuterol 108 (90 BASE) MCG/ACT Inhaler    albuterol    1 Inhaler    Inhale 1 puff into the lungs 3 times daily For 3-5 days.        ALLERGEN IMMUNOTHERAPY PRESCRIPTION     5 mL    Proceed with SCIT per standard buildup protocol.    Perennial allergic rhinitis       azelastine-fluticasone 137-50 MCG/ACT nasal spray    DYMISTA    2 Bottle    Spray 1 spray into both nostrils 2 times daily    Perennial allergic rhinitis with seasonal variation, Seasonal allergic rhinitis, unspecified allergic rhinitis trigger       dexlansoprazole 60 MG Cpdr CR capsule    DEXILANT     Take 60 mg by mouth daily        * EPINEPHrine 0.3 MG/0.3ML injection    EPIPEN    2 each    Inject 0.3 mLs into the muscle once as needed for anaphylaxis for 1 dose.    Chronic rhinitis       * EPINEPHrine 0.3 MG/0.3ML injection     6 mL    Inject 0.3 mLs (0.3 mg) into the muscle once as needed    Allergic reaction, subsequent encounter       fluticasone 50 MCG/ACT spray    FLONASE    16 g    Spray 2 sprays into both nostrils daily    Chronic rhinitis       loratadine 10 MG tablet    CLARITIN    30 tablet    Take 1 tablet (10 mg) by mouth daily    Allergic rhinitis due to pollen, Seasonal allergic rhinitis, Perennial allergic rhinitis with seasonal variation       montelukast 10 MG tablet    SINGULAIR    30 tablet    Take 1 tablet (10 mg) by mouth At Bedtime    Perennial allergic rhinitis with seasonal variation, Seasonal allergic rhinitis, Allergic rhinitis due to pollen       multivitamin, therapeutic with minerals Tabs tablet      Take 1 tablet by mouth daily        vitamin D 39115 UNIT capsule    ERGOCALCIFEROL     Take 50,000 Units by mouth once a week        * Notice:  This list has 2 medication(s) that are the same as other medications prescribed for you. Read the directions carefully, and ask your doctor or other care provider to review them with you.

## 2017-07-10 NOTE — PROGRESS NOTES
"Otolaryngology Progress Note      History of Present Illness   Patient presents with:  Consult: perennial allergic rhinitis, chronic pansinusitis, DNS- Alberto Sageruth Isabel is a 36 year old female   with chronic nasal obstruction and sinusitis presents for f/u of chronic congestion, left worse than right, present for years  Daily maxillary and frontal pressure, exacerbates with season change and with upper respiratory illness     Hx of distant sinus surgeyr and septoplasty with over 15 years ago with Dr. Ford, recalls painful packing removal post op    Hx of perennial allergic rhinitis with seasonal exacerbation    Thomas last note reviewed: using rosalba med, dymista, claritin and singulari  On subcutaneous immunotherapy     8/5/16  MQT-  Dilution #6: Dust  Dilution #5: Grass, Cat, dog  Dilution #2: Trees, molds, molds.     Physical Exam  /62 (BP Location: Right arm, Patient Position: Chair, Cuff Size: Adult Regular)  Pulse 70  Temp 98.8  F (37.1  C) (Tympanic)  Ht 5' 6\" (1.676 m)  Wt 174 lb (78.9 kg)  SpO2 98%  BMI 28.08 kg/m2  General - The patient is well nourished and well developed, and appears to have good nutritional status.  Alert and oriented to person and place, interactive.  Head and Face - Normocephalic and atraumatic, with no gross asymmetry noted of the contour of the facial features.  The facial nerve is intact, with strong symmetric movements.  Neck-no palpable lymphadenopathy or thyroid mass.  Trachea is midline.  Eyes - Extraocular movements intact.   Ears- External auditory canals are patent, tympanic membranes are intact without effusion or worrisome retractions   Nose - Nasal mucosa is pink and moist with no abnormal mucus.  The septum was deviated left 70% obstruction,   inferior turbinate hypertrophy    No polyps, masses, or purulence noted on examination.  Mouth - Examination of the oral cavity shows pink, healthy, moist mucosa.  No lesions or ulceration noted.  The dentition " are in good repair.  The tongue is mobile and midline.  Throat - The walls of the oropharynx were smooth, pink, moist, symmetric, and had no lesions or ulcerations.  The tonsillar pillars and soft palate were symmetric.  The uvula was midline on elevation.      To evaluate the nose and sinuses in the post operative state, I performed rigid nasal endoscopy. The LPN had previously sprayed both nares with lidocaine and neosynephrine.    I began with the LEFT side using a 0 degree rigid nasal endoscope, and then similarly examined the RIGHT side    Findings:  Inferior turbinates:  enlarged  Middle turbinate and middle meatus:  No purulence, no polyposis, no synechiae  Middle meatus narrow left  Left external nasal valve collapse with inhalation and asymmetry ENV at rest, photos taken, pointed out to Sydni  Mucosa is  healthy throughout without polyps nor polypoid degeneration    CT sinuses reviewed dated 4/28/17:  Minimal mucoperiosteal thickening bilateral max, o/w negative  DNS left ,  inferior turbinate hypertrophy     Impression/Plan  Sydni Isabel is a 36 year old female    ICD-10-CM    1. DNS (deviated nasal septum) J34.2    2. Chronic maxillary sinusitis J32.0    3. Chronic frontal sinusitis J32.1    4. Nasal turbinate hypertrophy J34.3    5. Perennial allergic rhinitis with seasonal variation J30.89     J30.2        The risks and complications of Endoscopic Sinus Surgery (ESS), septoplasty, turbinate reduction, repair left nasal valve   were openly discussed.  The potential risks include bleeding, general anesthesia, infection, scar formation, need for additional surgery, septal perforation, and rarely injury to the eye with the possibility of blindness,  injury to the brain, meningitis or other intracranial complications.  Nonsurgical options were discussed including prolonged antibioitics and/or oral and topical steroids.   Sinus anatomy and sinus disease were reviewed.  CT sinus was reviewed with the  patient.  All questions were answered.     Option of in-office procedure also discussed    Continue subcutaneous immunotherapy , dymijaxon, rosalba med, allergen avoidance        Yulisa Quiroz D.O.  Otolaryngology/Head and Neck Surgery  Allergy

## 2017-07-10 NOTE — PATIENT INSTRUCTIONS
Thank you for allowing Dr. Quiroz and our ENT team to participate in your care.  If you have a scheduling or an appointment question please contact [facility] Unit Coordinator at their direct line [phone].   ALL nursing questions or concerns can be directed to your ENT nurse at: [phone] - Patrizia    Continue Dymista  Continue Pa Med Saline Twice Daily  Follow up in surgery        HOW TO PREPARE-      You need to have a scheduled Pre-Op with your primary care physician within 30 days of your scheduled surgery. You should be set up with this before you leave today.       You need a friend or family member available to drive you home AND stay with you for 24 hours after you leave the hospital. You will not be allowed to drive yourself. IF you need to take a taxi or the bus you MUST have a responsible person to ride with you. YOUR PROCEDURE WILL BE CANCELLED IF YOU DO NOT HAVE A RESPONSIBLE ADULT TO DRIVE YOU HOME.       You CANNOT have anything to eat or drink after midnight the night before your surgery, including water and coffee. Your stomach needs to be completely empty. Do NOT chew gum, suck on hard candy, or smoke. You can brush your teeth the morning of surgery.       You need to call our Surgery Education Nurses 1-2 weeks prior to your surgery date at  [phone] or toll free [phone]. Please have your medication and allergy lists ready.      Stop your aspirin or other NSAIDs(Ibuprofen, Motrin, Aleve, Celebrex, Naproxen, etc...) 7 days before your surgery.      Hospital admitting will call you the day before your surgery with your exact arrival time.       Please call your primary care physician if you should become ill within 24 hours of scheduled surgery. (ex.vomiting, diarrhea, fever)          You will need to wash the night before AND the morning of you procedure with a liquid antibacterial soap, like Dial.

## 2017-07-14 ENCOUNTER — ALLIED HEALTH/NURSE VISIT (OUTPATIENT)
Dept: ALLERGY | Facility: OTHER | Age: 37
End: 2017-07-14
Attending: PHYSICIAN ASSISTANT
Payer: COMMERCIAL

## 2017-07-14 DIAGNOSIS — J30.9 ALLERGIC RHINITIS, UNSPECIFIED: Primary | ICD-10-CM

## 2017-07-14 PROCEDURE — 95115 IMMUNOTHERAPY ONE INJECTION: CPT

## 2017-07-14 NOTE — NURSING NOTE
Allergy injection/s given and charted on paper allergy flow sheet.  Patient signed form and left AMA.  Patient has Epi auto injector.     Marcelle Granado RN-BSN

## 2017-07-14 NOTE — MR AVS SNAPSHOT
After Visit Summary   7/14/2017    Sydni Isabel    MRN: [id number]           Patient Information     Date Of Birth          1980        Visit Information        Provider Department      7/14/2017 10:30 AM [facility]        Today's Diagnoses     Allergic rhinitis, unspecified    -  1       Follow-ups after your visit        Your next 10 appointments already scheduled     Jul 21, 2017 10:30 AM CDT   injection with HC SHOT ROOM   [facility] [place] ([facility] [place] )    [address]  [place] [address]   [phone]            Jul 28, 2017 10:30 AM CDT   injection with HC SHOT ROOM   [facility] [place] ([facility] [place] )    [address]  [place] [address]   [phone]              Who to contact     If you have questions or need follow up information about today's clinic visit or your schedule please contact [facility] directly at [phone].  Normal or non-critical lab and imaging results will be communicated to you by AppDynamicshart, letter or phone within 4 business days after the clinic has received the results. If you do not hear from us within 7 days, please contact the clinic through ePig Gamest or phone. If you have a critical or abnormal lab result, we will notify you by phone as soon as possible.  Submit refill requests through [employer] or call your pharmacy and they will forward the refill request to us. Please allow 3 business days for your refill to be completed.          Additional Information About Your Visit        MyChart Information     [employer] gives you secure access to your electronic health record. If you see a primary care provider, you can also send messages to your care team and make appointments. If you have questions, please call your primary care clinic.  If you do not have a primary care provider, please call [phone] and they will assist you.        Care EveryWhere ID     This is  your Care EveryWhere ID. This could be used by other organizations to access your [place] medical records  [id number]         Blood Pressure from Last 3 Encounters:   07/10/17 104/62   07/04/17 113/74   04/28/17 102/70    Weight from Last 3 Encounters:   07/10/17 78.9 kg (174 lb)   04/28/17 81.2 kg (179 lb)   08/05/16 78.9 kg (174 lb)              We Performed the Following     Allergy Shot: One injection        Primary Care Provider Office Phone # Fax #    Go Villarreal [phone]       [facility] [address]  [facility] [address]        Equal Access to Services     HERMINIO JI : Hadii aad ku hadasho Sojoanne, waaxda luqadaha, qaybta kaalmada adeegyada, susanna amier . So [facility] [phone].    ATENCIÓN: Si habla español, tiene a suero disposición servicios gratuitos de asistencia lingüística. Llame al [phone].    We comply with applicable federal civil rights laws and [place] laws. We do not discriminate on the basis of race, color, national origin, age, disability sex, sexual orientation or gender identity.            Thank you!     Thank you for choosing [facility]  for your care. Our goal is always to provide you with excellent care. Hearing back from our patients is one way we can continue to improve our services. Please take a few minutes to complete the written survey that you may receive in the mail after your visit with us. Thank you!             Your Updated Medication List - Protect others around you: Learn how to safely use, store and throw away your medicines at www.disposemymeds.org.          This list is accurate as of: 7/14/17 10:50 AM.  Always use your most recent med list.                   Brand Name Dispense Instructions for use Diagnosis    albuterol 108 (90 BASE) MCG/ACT Inhaler    albuterol    1 Inhaler    Inhale 1 puff into the lungs 3 times daily For 3-5 days.        ALLERGEN IMMUNOTHERAPY PRESCRIPTION     5 mL     Proceed with SCIT per standard buildup protocol.    Perennial allergic rhinitis       azelastine-fluticasone 137-50 MCG/ACT nasal spray    DYMISTA    2 Bottle    Spray 1 spray into both nostrils 2 times daily    Perennial allergic rhinitis with seasonal variation, Seasonal allergic rhinitis, unspecified allergic rhinitis trigger       dexlansoprazole 60 MG Cpdr CR capsule    DEXILANT     Take 60 mg by mouth daily        * EPINEPHrine 0.3 MG/0.3ML injection    EPIPEN    2 each    Inject 0.3 mLs into the muscle once as needed for anaphylaxis for 1 dose.    Chronic rhinitis       * EPINEPHrine 0.3 MG/0.3ML injection 2-pack    EPIPEN/ADRENACLICK/or ANY BX GENERIC EQUIV    6 mL    Inject 0.3 mLs (0.3 mg) into the muscle once as needed    Allergic reaction, subsequent encounter       fluticasone 50 MCG/ACT spray    FLONASE    16 g    Spray 2 sprays into both nostrils daily    Chronic rhinitis       loratadine 10 MG tablet    CLARITIN    30 tablet    Take 1 tablet (10 mg) by mouth daily    Allergic rhinitis due to pollen, Seasonal allergic rhinitis, Perennial allergic rhinitis with seasonal variation       montelukast 10 MG tablet    SINGULAIR    30 tablet    Take 1 tablet (10 mg) by mouth At Bedtime    Perennial allergic rhinitis with seasonal variation, Seasonal allergic rhinitis, Allergic rhinitis due to pollen       multivitamin, therapeutic with minerals Tabs tablet      Take 1 tablet by mouth daily        vitamin D 18623 UNIT capsule    ERGOCALCIFEROL     Take 50,000 Units by mouth once a week        * Notice:  This list has 2 medication(s) that are the same as other medications prescribed for you. Read the directions carefully, and ask your doctor or other care provider to review them with you.

## 2017-07-21 ENCOUNTER — ALLIED HEALTH/NURSE VISIT (OUTPATIENT)
Dept: ALLERGY | Facility: OTHER | Age: 37
End: 2017-07-21
Attending: PHYSICIAN ASSISTANT
Payer: COMMERCIAL

## 2017-07-21 DIAGNOSIS — J30.9 ALLERGIC RHINITIS, UNSPECIFIED: Primary | ICD-10-CM

## 2017-07-21 PROCEDURE — 95115 IMMUNOTHERAPY ONE INJECTION: CPT

## 2017-07-21 NOTE — MR AVS SNAPSHOT
After Visit Summary   7/21/2017    Sydni Isabel    MRN: [id number]           Patient Information     Date Of Birth          1980        Visit Information        Provider Department      7/21/2017 10:30 AM [facility]        Today's Diagnoses     Allergic rhinitis, unspecified    -  1       Follow-ups after your visit        Your next 10 appointments already scheduled     Jul 28, 2017 10:30 AM CDT   injection with HC SHOT ROOM   [facility] [place] ([facility] [place] )    [address]  [place] [address]   [phone]              Who to contact     If you have questions or need follow up information about today's clinic visit or your schedule please contact [facility] directly at [phone].  Normal or non-critical lab and imaging results will be communicated to you by MyChart, letter or phone within 4 business days after the clinic has received the results. If you do not hear from us within 7 days, please contact the clinic through MyChart or phone. If you have a critical or abnormal lab result, we will notify you by phone as soon as possible.  Submit refill requests through Trac[employer] or call your pharmacy and they will forward the refill request to us. Please allow 3 business days for your refill to be completed.          Additional Information About Your Visit        MyChart Information     [employer] gives you secure access to your electronic health record. If you see a primary care provider, you can also send messages to your care team and make appointments. If you have questions, please call your primary care clinic.  If you do not have a primary care provider, please call [phone] and they will assist you.        Care EveryWhere ID     This is your Care EveryWhere ID. This could be used by other organizations to access your [place] medical records  [id number]         Blood Pressure from Last 3 Encounters:   07/10/17  104/62   07/04/17 113/74   04/28/17 102/70    Weight from Last 3 Encounters:   07/10/17 174 lb (78.9 kg)   04/28/17 179 lb (81.2 kg)   08/05/16 174 lb (78.9 kg)              We Performed the Following     Allergy Shot: One injection        Primary Care Provider Office Phone # Fax #    Go LAUREL Villarreal [phone]       [facility] [address] [employer] [address]        Equal Access to Services     [facility]: Hadii aad ku hadasho Soomaali, waaxda luqadaha, qaybta kaalmada adeegyada, waxay idiin hayaan adeeg luciaaraerica maier . So [facility] [phone].    ATENCIÓN: Si habla español, tiene a suero disposición servicios gratuitos de asistencia lingüística. [facility] [phone].    We comply with applicable federal civil rights laws and [place] laws. We do not discriminate on the basis of race, color, national origin, age, disability sex, sexual orientation or gender identity.            Thank you!     Thank you for choosing [facility]  for your care. Our goal is always to provide you with excellent care. Hearing back from our patients is one way we can continue to improve our services. Please take a few minutes to complete the written survey that you may receive in the mail after your visit with us. Thank you!             Your Updated Medication List - Protect others around you: Learn how to safely use, store and throw away your medicines at www.disposemymeds.org.          This list is accurate as of: 7/21/17 10:44 AM.  Always use your most recent med list.                   Brand Name Dispense Instructions for use Diagnosis    albuterol 108 (90 BASE) MCG/ACT Inhaler    albuterol    1 Inhaler    Inhale 1 puff into the lungs 3 times daily For 3-5 days.        ALLERGEN IMMUNOTHERAPY PRESCRIPTION     5 mL    Proceed with SCIT per standard buildup protocol.    Perennial allergic rhinitis       azelastine-fluticasone 137-50 MCG/ACT nasal spray    DYMISTA    2 Bottle    Spray 1 spray  into both nostrils 2 times daily    Perennial allergic rhinitis with seasonal variation, Seasonal allergic rhinitis, unspecified allergic rhinitis trigger       dexlansoprazole 60 MG Cpdr CR capsule    DEXILANT     Take 60 mg by mouth daily        * EPINEPHrine 0.3 MG/0.3ML injection    EPIPEN    2 each    Inject 0.3 mLs into the muscle once as needed for anaphylaxis for 1 dose.    Chronic rhinitis       * EPINEPHrine 0.3 MG/0.3ML injection 2-pack    EPIPEN/ADRENACLICK/or ANY BX GENERIC EQUIV    6 mL    Inject 0.3 mLs (0.3 mg) into the muscle once as needed    Allergic reaction, subsequent encounter       fluticasone 50 MCG/ACT spray    FLONASE    16 g    Spray 2 sprays into both nostrils daily    Chronic rhinitis       loratadine 10 MG tablet    CLARITIN    30 tablet    Take 1 tablet (10 mg) by mouth daily    Allergic rhinitis due to pollen, Seasonal allergic rhinitis, Perennial allergic rhinitis with seasonal variation       montelukast 10 MG tablet    SINGULAIR    30 tablet    Take 1 tablet (10 mg) by mouth At Bedtime    Perennial allergic rhinitis with seasonal variation, Seasonal allergic rhinitis, Allergic rhinitis due to pollen       multivitamin, therapeutic with minerals Tabs tablet      Take 1 tablet by mouth daily        vitamin D 56769 UNIT capsule    ERGOCALCIFEROL     Take 50,000 Units by mouth once a week        * Notice:  This list has 2 medication(s) that are the same as other medications prescribed for you. Read the directions carefully, and ask your doctor or other care provider to review them with you.

## 2017-07-31 ENCOUNTER — ALLIED HEALTH/NURSE VISIT (OUTPATIENT)
Dept: ALLERGY | Facility: OTHER | Age: 37
End: 2017-07-31
Attending: PHYSICIAN ASSISTANT
Payer: COMMERCIAL

## 2017-07-31 DIAGNOSIS — J30.9 ALLERGIC RHINITIS, UNSPECIFIED: Primary | ICD-10-CM

## 2017-07-31 PROCEDURE — 95115 IMMUNOTHERAPY ONE INJECTION: CPT

## 2017-07-31 NOTE — MR AVS SNAPSHOT
After Visit Summary   7/31/2017    Sydni Isabel    MRN: [id number]           Patient Information     Date Of Birth          1980        Visit Information        Provider Department      7/31/2017 3:45 PM [facility]        Today's Diagnoses     Allergic rhinitis, unspecified    -  1       Follow-ups after your visit        Your next 10 appointments already scheduled     Aug 08, 2017  3:15 PM CDT   injection with HC SHOT ROOM   [facility] [place] ([facility] [place] )    [address]  [place] [address]   [phone]              Who to contact     If you have questions or need follow up information about today's clinic visit or your schedule please contact [facility] directly at [phone].  Normal or non-critical lab and imaging results will be communicated to you by MyChart, letter or phone within 4 business days after the clinic has received the results. If you do not hear from us within 7 days, please contact the clinic through MyChart or phone. If you have a critical or abnormal lab result, we will notify you by phone as soon as possible.  Submit refill requests through [employer] or call your pharmacy and they will forward the refill request to us. Please allow 3 business days for your refill to be completed.          Additional Information About Your Visit        MyChart Information     [employer] gives you secure access to your electronic health record. If you see a primary care provider, you can also send messages to your care team and make appointments. If you have questions, please call your primary care clinic.  If you do not have a primary care provider, please call [phone] and they will assist you.        Care EveryWhere ID     This is your Care EveryWhere ID. This could be used by other organizations to access your [place] medical records  [id number]         Blood Pressure from Last 3 Encounters:   07/10/17  104/62   07/04/17 113/74   04/28/17 102/70    Weight from Last 3 Encounters:   07/10/17 174 lb (78.9 kg)   04/28/17 179 lb (81.2 kg)   08/05/16 174 lb (78.9 kg)              We Performed the Following     Allergy Shot: One injection        Primary Care Provider Office Phone # Fax #    Go LAUREL Villarreal [phone]       [facility] [address] [facility] [address]        Equal Access to Services     [facility]: Hadii aad ku hadasho Soomaali, waaxda luqadaha, qaybta kaalmada adeegyada, waxay idiin hayaan adeeg bianca maier . So [facility] [place] [facility] [phone].    ATENCIÓN: Si habla español, tiene a suero disposición servicios gratuitos de asistencia lingüística. [facility] [phone].    We comply with applicable federal civil rights laws and [place] laws. We do not discriminate on the basis of race, color, national origin, age, disability sex, sexual orientation or gender identity.            Thank you!     Thank you for choosing [facility]  for your care. Our goal is always to provide you with excellent care. Hearing back from our patients is one way we can continue to improve our services. Please take a few minutes to complete the written survey that you may receive in the mail after your visit with us. Thank you!             Your Updated Medication List - Protect others around you: Learn how to safely use, store and throw away your medicines at www.disposemymeds.org.          This list is accurate as of: 7/31/17  4:05 PM.  Always use your most recent med list.                   Brand Name Dispense Instructions for use Diagnosis    albuterol 108 (90 BASE) MCG/ACT Inhaler    albuterol    1 Inhaler    Inhale 1 puff into the lungs 3 times daily For 3-5 days.        ALLERGEN IMMUNOTHERAPY PRESCRIPTION     5 mL    Proceed with SCIT per standard buildup protocol.    Perennial allergic rhinitis       azelastine-fluticasone 137-50 MCG/ACT nasal spray    DYMISTA    2 Bottle    Spray 1 spray  into both nostrils 2 times daily    Perennial allergic rhinitis with seasonal variation, Seasonal allergic rhinitis, unspecified allergic rhinitis trigger       dexlansoprazole 60 MG Cpdr CR capsule    DEXILANT     Take 60 mg by mouth daily        * EPINEPHrine 0.3 MG/0.3ML injection    EPIPEN    2 each    Inject 0.3 mLs into the muscle once as needed for anaphylaxis for 1 dose.    Chronic rhinitis       * EPINEPHrine 0.3 MG/0.3ML injection 2-pack    EPIPEN/ADRENACLICK/or ANY BX GENERIC EQUIV    6 mL    Inject 0.3 mLs (0.3 mg) into the muscle once as needed    Allergic reaction, subsequent encounter       fluticasone 50 MCG/ACT spray    FLONASE    16 g    Spray 2 sprays into both nostrils daily    Chronic rhinitis       loratadine 10 MG tablet    CLARITIN    30 tablet    Take 1 tablet (10 mg) by mouth daily    Allergic rhinitis due to pollen, Seasonal allergic rhinitis, Perennial allergic rhinitis with seasonal variation       montelukast 10 MG tablet    SINGULAIR    30 tablet    Take 1 tablet (10 mg) by mouth At Bedtime    Perennial allergic rhinitis with seasonal variation, Seasonal allergic rhinitis, Allergic rhinitis due to pollen       multivitamin, therapeutic with minerals Tabs tablet      Take 1 tablet by mouth daily        vitamin D 21196 UNIT capsule    ERGOCALCIFEROL     Take 50,000 Units by mouth once a week        * Notice:  This list has 2 medication(s) that are the same as other medications prescribed for you. Read the directions carefully, and ask your doctor or other care provider to review them with you.

## 2017-08-08 ENCOUNTER — ALLIED HEALTH/NURSE VISIT (OUTPATIENT)
Dept: ALLERGY | Facility: OTHER | Age: 37
End: 2017-08-08
Attending: PHYSICIAN ASSISTANT
Payer: COMMERCIAL

## 2017-08-08 DIAGNOSIS — J30.9 ALLERGIC RHINITIS, UNSPECIFIED: Primary | ICD-10-CM

## 2017-08-08 PROCEDURE — 95115 IMMUNOTHERAPY ONE INJECTION: CPT

## 2017-08-08 NOTE — PROGRESS NOTES
Prior to injection verified patient identity using patient's name and date of birth. Allergy injection/s given and charted on paper allergy flow sheet.  Patient left AMA. Patient and nurse signed AMA, dated, and timed.     Angelia Henning

## 2017-08-08 NOTE — PROGRESS NOTES
Allergy injection/s given and charted on paper allergy flow sheet.  Patient experienced unknow reaction - signed AMA. Karie Osullivan LPN

## 2017-08-08 NOTE — MR AVS SNAPSHOT
"              After Visit Summary   2017    Sydni Isabel    MRN: [id number]           Patient Information     Date Of Birth          1980        Visit Information        Provider Department      2017 3:15 PM HC SHOT ROOM [facility]        Today's Diagnoses     Allergic rhinitis, unspecified    -  1       Follow-ups after your visit        Who to contact     If you have questions or need follow up information about today's clinic visit or your schedule please [facility] at [phone].  Normal or non-critical lab and imaging results will be communicated to you by MyChart, letter or phone within 4 business days after the clinic has received the results. If you do not hear from us within 7 days, please contact the clinic through TranscribeMehart or phone. If you have a critical or abnormal lab result, we will notify you by phone as soon as possible.  Submit refill requests through [employer] or call your pharmacy and they will forward the refill request to us. Please allow 3 business days for your refill to be completed.          Additional Information About Your Visit        MyChart Information     [employer] lets you send messages to your doctor, view your test results, renew your prescriptions, schedule appointments and more. To sign up, go to [email or web address] . Click on \"Log in\" on the left side of the screen, which will take you to the Welcome page. Then click on \"Sign up Now\" on the right side of the page.     You will be asked to enter the access code listed below, as well as some personal information. Please follow the directions to create your username and password.     Your access code is: [email or web address]  Expires: 2017  3:36 PM     Your access code will  in 90 days. If you need help or a new code, please call your [facility] or [phone].        Care EveryWhere ID     This is your Care EveryWhere ID. This could be used by other " organizations to access your [place] medical records  [id number]         Blood Pressure from Last 3 Encounters:   07/10/17 104/62   07/04/17 113/74   04/28/17 102/70    Weight from Last 3 Encounters:   07/10/17 174 lb (78.9 kg)   04/28/17 179 lb (81.2 kg)   08/05/16 174 lb (78.9 kg)              We Performed the Following     Allergy Shot: One injection        Primary Care Provider Office Phone # Fax #    Go LAUREL Villarreal [phone]       [facility] [address] [facility] [address]        Equal Access to Services     [facility]: Hadii aad ku hadasho Sojoanne, waaxda luqadaha, qaybta kaalmada adeegyada, waxloulou boucherin hayaan martha maier . So [facility] [phone].    ATENCIÓN: Si habla español, tiene a suero disposición servicios gratuitos de asistencia lingüística. [facility] [phone].    We comply with applicable federal civil rights laws and [place] laws. We do not discriminate on the basis of race, color, national origin, age, disability sex, sexual orientation or gender identity.            Thank you!     Thank you for choosing [facility]  for your care. Our goal is always to provide you with excellent care. Hearing back from our patients is one way we can continue to improve our services. Please take a few minutes to complete the written survey that you may receive in the mail after your visit with us. Thank you!             Your Updated Medication List - Protect others around you: Learn how to safely use, store and throw away your medicines at www.disposemymeds.org.          This list is accurate as of: 8/8/17  3:36 PM.  Always use your most recent med list.                   Brand Name Dispense Instructions for use Diagnosis    albuterol 108 (90 BASE) MCG/ACT Inhaler    albuterol    1 Inhaler    Inhale 1 puff into the lungs 3 times daily For 3-5 days.        ALLERGEN IMMUNOTHERAPY PRESCRIPTION     5 mL    Proceed with SCIT per standard buildup protocol.     Perennial allergic rhinitis       azelastine-fluticasone 137-50 MCG/ACT nasal spray    DYMISTA    2 Bottle    Spray 1 spray into both nostrils 2 times daily    Perennial allergic rhinitis with seasonal variation, Seasonal allergic rhinitis, unspecified allergic rhinitis trigger       dexlansoprazole 60 MG Cpdr CR capsule    DEXILANT     Take 60 mg by mouth daily        * EPINEPHrine 0.3 MG/0.3ML injection    EPIPEN    2 each    Inject 0.3 mLs into the muscle once as needed for anaphylaxis for 1 dose.    Chronic rhinitis       * EPINEPHrine 0.3 MG/0.3ML injection 2-pack    EPIPEN/ADRENACLICK/or ANY BX GENERIC EQUIV    6 mL    Inject 0.3 mLs (0.3 mg) into the muscle once as needed    Allergic reaction, subsequent encounter       fluticasone 50 MCG/ACT spray    FLONASE    16 g    Spray 2 sprays into both nostrils daily    Chronic rhinitis       loratadine 10 MG tablet    CLARITIN    30 tablet    Take 1 tablet (10 mg) by mouth daily    Allergic rhinitis due to pollen, Seasonal allergic rhinitis, Perennial allergic rhinitis with seasonal variation       montelukast 10 MG tablet    SINGULAIR    30 tablet    Take 1 tablet (10 mg) by mouth At Bedtime    Perennial allergic rhinitis with seasonal variation, Seasonal allergic rhinitis, Allergic rhinitis due to pollen       multivitamin, therapeutic with minerals Tabs tablet      Take 1 tablet by mouth daily        vitamin D 11130 UNIT capsule    ERGOCALCIFEROL     Take 50,000 Units by mouth once a week        * Notice:  This list has 2 medication(s) that are the same as other medications prescribed for you. Read the directions carefully, and ask your doctor or other care provider to review them with you.

## 2017-08-15 ENCOUNTER — ALLIED HEALTH/NURSE VISIT (OUTPATIENT)
Dept: ALLERGY | Facility: OTHER | Age: 37
End: 2017-08-15
Attending: PHYSICIAN ASSISTANT
Payer: COMMERCIAL

## 2017-08-15 DIAGNOSIS — J30.9 ALLERGIC RHINITIS, UNSPECIFIED: Primary | ICD-10-CM

## 2017-08-15 PROCEDURE — 95115 IMMUNOTHERAPY ONE INJECTION: CPT

## 2017-08-15 NOTE — MR AVS SNAPSHOT
"              After Visit Summary   8/15/2017    Sydni Isabel    MRN: [id number]           Patient Information     Date Of Birth          1980        Visit Information        Provider Department      8/15/2017 4:00 PM HC SHOT ROOM [facility]        Today's Diagnoses     Allergic rhinitis, unspecified    -  1       Follow-ups after your visit        Who to contact     If you have questions or need follow up information about today's clinic visit or your schedule please [facility] at [phone].  Normal or non-critical lab and imaging results will be communicated to you by MyChart, letter or phone within 4 business days after the clinic has received the results. If you do not hear from us within 7 days, please contact the clinic through [employer] or phone. If you have a critical or abnormal lab result, we will notify you by phone as soon as possible.  Submit refill requests through [email or web address] or call your pharmacy and they will forward the refill request to us. Please allow 3 business days for your refill to be completed.          Additional Information About Your Visit        MyChart Information     [email or web address] lets you send messages to your doctor, view your test results, renew your prescriptions, schedule appointments and more. To sign up, go to [email or web address] . Click on \"Log in\" on the left side of the screen, which will take you to the Welcome page. Then click on \"Sign up Now\" on the right side of the page.     You will be asked to enter the access code listed below, as well as some personal information. Please follow the directions to create your username and password.     Your access code is: [email or web address]  Expires: 2017  3:36 PM     Your access code will  in 90 days. If you need help or a new code, please call your [facility] or [phone].        Care EveryWhere ID     This is your Care EveryWhere ID. This could be used by other " organizations to access your [place] medical records  [id number]         Blood Pressure from Last 3 Encounters:   07/10/17 104/62   07/04/17 113/74   04/28/17 102/70    Weight from Last 3 Encounters:   07/10/17 174 lb (78.9 kg)   04/28/17 179 lb (81.2 kg)   08/05/16 174 lb (78.9 kg)              We Performed the Following     Allergy Shot: One injection        Primary Care Provider Office Phone # Fax #    Go LAUREL Villarreal [phone]       [facility] [address] [facility] [address]        Equal Access to Services     : Hadii aad ku hadasho Sojoanne, waaxda luqadaha, qaybta kaalmada adeegyada, waxloulou boucherin hayaan martha maier . So [facility] [phone].    ATENCIÓN: Si habla español, tiene a suero disposición servicios gratuitos de asistencia lingüística. [facility] [phone].    We comply with applicable federal civil rights laws and [place] laws. We do not discriminate on the basis of race, color, national origin, age, disability sex, sexual orientation or gender identity.            Thank you!     Thank you for choosing [facility]  for your care. Our goal is always to provide you with excellent care. Hearing back from our patients is one way we can continue to improve our services. Please take a few minutes to complete the written survey that you may receive in the mail after your visit with us. Thank you!             Your Updated Medication List - Protect others around you: Learn how to safely use, store and throw away your medicines at www.disposemymeds.org.          This list is accurate as of: 8/15/17  4:33 PM.  Always use your most recent med list.                   Brand Name Dispense Instructions for use Diagnosis    albuterol 108 (90 BASE) MCG/ACT Inhaler    albuterol    1 Inhaler    Inhale 1 puff into the lungs 3 times daily For 3-5 days.        ALLERGEN IMMUNOTHERAPY PRESCRIPTION     5 mL    Proceed with SCIT per standard buildup protocol.     Perennial allergic rhinitis       azelastine-fluticasone 137-50 MCG/ACT nasal spray    DYMISTA    2 Bottle    Spray 1 spray into both nostrils 2 times daily    Perennial allergic rhinitis with seasonal variation, Seasonal allergic rhinitis, unspecified allergic rhinitis trigger       dexlansoprazole 60 MG Cpdr CR capsule    DEXILANT     Take 60 mg by mouth daily        * EPINEPHrine 0.3 MG/0.3ML injection    EPIPEN    2 each    Inject 0.3 mLs into the muscle once as needed for anaphylaxis for 1 dose.    Chronic rhinitis       * EPINEPHrine 0.3 MG/0.3ML injection 2-pack    EPIPEN/ADRENACLICK/or ANY BX GENERIC EQUIV    6 mL    Inject 0.3 mLs (0.3 mg) into the muscle once as needed    Allergic reaction, subsequent encounter       fluticasone 50 MCG/ACT spray    FLONASE    16 g    Spray 2 sprays into both nostrils daily    Chronic rhinitis       loratadine 10 MG tablet    CLARITIN    30 tablet    Take 1 tablet (10 mg) by mouth daily    Allergic rhinitis due to pollen, Seasonal allergic rhinitis, Perennial allergic rhinitis with seasonal variation       montelukast 10 MG tablet    SINGULAIR    30 tablet    Take 1 tablet (10 mg) by mouth At Bedtime    Perennial allergic rhinitis with seasonal variation, Seasonal allergic rhinitis, Allergic rhinitis due to pollen       multivitamin, therapeutic with minerals Tabs tablet      Take 1 tablet by mouth daily        vitamin D 65196 UNIT capsule    ERGOCALCIFEROL     Take 50,000 Units by mouth once a week        * Notice:  This list has 2 medication(s) that are the same as other medications prescribed for you. Read the directions carefully, and ask your doctor or other care provider to review them with you.

## 2017-08-15 NOTE — PROGRESS NOTES
Prior to injection the patient's identity was verified using the patient's name and date of birth.    Per orders of RANDELL Basilio, allergy injection is given.    1 Allergy injection is given and charted on the allergy flow .    The patient is instructed to remain in the clinic for 30 minutes after injection, to report any adverse reactions.        The patient signed out AMA and did not remain for observation.  Ana Luisa Khan RN

## 2017-08-25 DIAGNOSIS — J30.2 SEASONAL ALLERGIC RHINITIS: ICD-10-CM

## 2017-08-25 DIAGNOSIS — J30.1 ALLERGIC RHINITIS DUE TO POLLEN: ICD-10-CM

## 2017-08-25 DIAGNOSIS — J30.2 PERENNIAL ALLERGIC RHINITIS WITH SEASONAL VARIATION: ICD-10-CM

## 2017-08-25 DIAGNOSIS — J30.89 PERENNIAL ALLERGIC RHINITIS WITH SEASONAL VARIATION: ICD-10-CM

## 2017-08-25 RX ORDER — LORATADINE 10 MG/1
TABLET ORAL
Qty: 30 TABLET | Refills: 11 | Status: SHIPPED | OUTPATIENT
Start: 2017-08-25

## 2017-08-27 ENCOUNTER — HOSPITAL ENCOUNTER (EMERGENCY)
Facility: HOSPITAL | Age: 37
Discharge: HOME | End: 2017-08-27
Attending: EMERGENCY MEDICINE | Admitting: EMERGENCY MEDICINE
Payer: COMMERCIAL

## 2017-08-27 DIAGNOSIS — R20.0 PERIORAL NUMBNESS: Primary | ICD-10-CM

## 2017-08-27 LAB
ALBUMIN SERPL-MCNC: 3.5 G/DL (ref 3.4–5)
ALP SERPL-CCNC: 48 U/L (ref 40–150)
ALT SERPL W P-5'-P-CCNC: 23 U/L (ref 0–50)
ANION GAP SERPL CALCULATED.3IONS-SCNC: 8 MMOL/L (ref 3–14)
AST SERPL W P-5'-P-CCNC: 18 U/L (ref 0–45)
BASOPHILS # BLD AUTO: 0 10E9/L (ref 0–0.2)
BASOPHILS NFR BLD AUTO: 0.5 %
BILIRUB SERPL-MCNC: 0.4 MG/DL (ref 0.2–1.3)
BUN SERPL-MCNC: 12 MG/DL (ref 7–30)
CALCIUM SERPL-MCNC: 9 MG/DL (ref 8.5–10.1)
CHLORIDE SERPL-SCNC: 105 MMOL/L (ref 94–109)
CO2 SERPL-SCNC: 29 MMOL/L (ref 20–32)
CREAT SERPL-MCNC: 0.57 MG/DL (ref 0.52–1.04)
DIFFERENTIAL METHOD BLD: NORMAL
EOSINOPHIL # BLD AUTO: 0.3 10E9/L (ref 0–0.7)
EOSINOPHIL NFR BLD AUTO: 4.7 %
ERYTHROCYTE [DISTWIDTH] IN BLOOD BY AUTOMATED COUNT: 12.1 % (ref 10–15)
ERYTHROCYTE [SEDIMENTATION RATE] IN BLOOD BY WESTERGREN METHOD: 7 MM/H (ref 0–20)
GFR SERPL CREATININE-BSD FRML MDRD: >90 ML/MIN/1.7M2
GLUCOSE SERPL-MCNC: 94 MG/DL (ref 70–99)
HCT VFR BLD AUTO: 40.1 % (ref 35–47)
HGB BLD-MCNC: 13.6 G/DL (ref 11.7–15.7)
IMM GRANULOCYTES # BLD: 0 10E9/L (ref 0–0.4)
IMM GRANULOCYTES NFR BLD: 0.4 %
LYMPHOCYTES # BLD AUTO: 2.2 10E9/L (ref 0.8–5.3)
LYMPHOCYTES NFR BLD AUTO: 39.9 %
MAGNESIUM SERPL-MCNC: 2 MG/DL (ref 1.6–2.3)
MCH RBC QN AUTO: 30 PG (ref 26.5–33)
MCHC RBC AUTO-ENTMCNC: 33.9 G/DL (ref 31.5–36.5)
MCV RBC AUTO: 89 FL (ref 78–100)
MONOCYTES # BLD AUTO: 0.4 10E9/L (ref 0–1.3)
MONOCYTES NFR BLD AUTO: 6.7 %
NEUTROPHILS # BLD AUTO: 2.6 10E9/L (ref 1.6–8.3)
NEUTROPHILS NFR BLD AUTO: 47.8 %
NRBC # BLD AUTO: 0 10*3/UL
NRBC BLD AUTO-RTO: 0 /100
PLATELET # BLD AUTO: 237 10E9/L (ref 150–450)
POTASSIUM SERPL-SCNC: 3.5 MMOL/L (ref 3.4–5.3)
PROT SERPL-MCNC: 6.9 G/DL (ref 6.8–8.8)
RBC # BLD AUTO: 4.53 10E12/L (ref 3.8–5.2)
SODIUM SERPL-SCNC: 142 MMOL/L (ref 133–144)
TROPONIN I SERPL-MCNC: <0.015 UG/L (ref 0–0.04)
WBC # BLD AUTO: 5.5 10E9/L (ref 4–11)

## 2017-08-27 PROCEDURE — 99284 EMERGENCY DEPT VISIT MOD MDM: CPT | Mod: 25

## 2017-08-27 PROCEDURE — 99285 EMERGENCY DEPT VISIT HI MDM: CPT | Mod: 25

## 2017-08-27 PROCEDURE — 84484 ASSAY OF TROPONIN QUANT: CPT | Performed by: EMERGENCY MEDICINE

## 2017-08-27 PROCEDURE — 36415 COLL VENOUS BLD VENIPUNCTURE: CPT | Performed by: EMERGENCY MEDICINE

## 2017-08-27 PROCEDURE — 85025 COMPLETE CBC W/AUTO DIFF WBC: CPT | Performed by: EMERGENCY MEDICINE

## 2017-08-27 PROCEDURE — 70450 CT HEAD/BRAIN W/O DYE: CPT | Mod: TC

## 2017-08-27 PROCEDURE — 93005 ELECTROCARDIOGRAM TRACING: CPT

## 2017-08-27 PROCEDURE — 93010 ELECTROCARDIOGRAM REPORT: CPT | Performed by: INTERNAL MEDICINE

## 2017-08-27 PROCEDURE — 85652 RBC SED RATE AUTOMATED: CPT | Performed by: EMERGENCY MEDICINE

## 2017-08-27 PROCEDURE — 80053 COMPREHEN METABOLIC PANEL: CPT | Performed by: EMERGENCY MEDICINE

## 2017-08-27 PROCEDURE — 83735 ASSAY OF MAGNESIUM: CPT | Performed by: EMERGENCY MEDICINE

## 2017-08-27 PROCEDURE — 99284 EMERGENCY DEPT VISIT MOD MDM: CPT | Performed by: EMERGENCY MEDICINE

## 2017-08-27 NOTE — ED AVS SNAPSHOT
[facility]    [address] [address]    Phone:  [phone]                                       Sydni Isabel   MRN: [id number]    Department:  [facility]   Date of Visit:  8/27/2017           After Visit Summary Signature Page     I have received my discharge instructions, and my questions have been answered. I have discussed any challenges I see with this plan with the nurse or doctor.    ..........................................................................................................................................  Patient/Patient Representative Signature      ..........................................................................................................................................  Patient Representative Print Name and Relationship to Patient    ..................................................               ................................................  Date                                            Time    ..........................................................................................................................................  Reviewed by Signature/Title    ...................................................              ..............................................  Date                                                            Time

## 2017-08-27 NOTE — ED AVS SNAPSHOT
[facility]    [address] [address]    Phone:  [phone]                                       Sydni Isabel   MRN: [id number]    Department:  [facility]   Date of Visit:  8/27/2017           Patient Information     Date Of Birth          1980        Your diagnoses for this visit were:     Perioral numbness        You were seen by Adalberto Magallanes MD.      Follow-up Information     Follow up with Go Villarreal MD In 2 days.    Specialty:  Family Practice    Why:  Continuity of care    Contact information:    [facility]  [address] [facility] [address]  [phone]          Discharge Instructions       Electromyogram  Your test is on (date) ________________ at (time) __________  [facility]  Neuromuscular laboratory, [address] [place] [address]  Phone: [phone]; Fax: [phone]  What is an electromyogram (EMG)?  This test uses electrodes and a needle to measure the electrical signals of your nerves and muscles. It helps find the cause of weakness, spasms, numbness or pain in the arms, legs or face. The test helps diagnose muscle and nerve diseases.  How is the test done?  The test takes 30 to 60 minutes. Before the test, you will have a brief exam.  Part 1    We place electrodes on the skin.    An electric charge is sent along the nerve. Each charge causes a brief shock.    This allows us to measure how well the nerve carries the signal.    Part 2    The doctor inserts a fine needle into various muscle groups. There is no electric charge.    The doctor can see and hear the muscle's electrical activity on the monitors.    What if I'm feeling nervous?  Most people handle the test without a problem. We do not prescribe medicine for this test. If you feel very anxious, call your regular doctor and discuss options to help you relax.  How do I get the results?  Your regular doctor  will receive a report in about a week. You will need to call your doctor.  For informational purposes only. Not to replace the advice of your health care provider.  Copyright   2015 [facility] [employer]. All rights reserved. [employer] 282981 - REV 02/17.      Future Appointments        Provider Department Dept Phone Center    8/28/2017 9:15 AM [facility] [phone] [place]         Review of your medicines      Our records show that you are taking the medicines listed below. If these are incorrect, please call your family doctor or clinic.        Dose / Directions Last dose taken    albuterol 108 (90 BASE) MCG/ACT Inhaler   Commonly known as:  PROAIR HFA   Dose:  1 puff   Quantity:  1 Inhaler        Inhale 1 puff into the lungs 3 times daily For 3-5 days.   Refills:  0        ALLERGEN IMMUNOTHERAPY PRESCRIPTION   Quantity:  5 mL        Proceed with SCIT per standard buildup protocol.   Refills:  PRN        azelastine-fluticasone 137-50 MCG/ACT nasal spray   Commonly known as:  DYMISTA   Dose:  1 spray   Quantity:  2 Bottle        Spray 1 spray into both nostrils 2 times daily   Refills:  3        dexlansoprazole 60 MG Cpdr CR capsule   Commonly known as:  DEXILANT   Dose:  60 mg        Take 60 mg by mouth daily   Refills:  0        * EPINEPHrine 0.3 MG/0.3ML injection   Commonly known as:  EPIPEN   Dose:  0.3 mg   Quantity:  2 each        Inject 0.3 mLs into the muscle once as needed for anaphylaxis for 1 dose.   Refills:  3        * EPINEPHrine 0.3 MG/0.3ML injection 2-pack   Commonly known as:  EPIPEN/ADRENACLICK/or ANY BX GENERIC EQUIV   Dose:  0.3 mg   Quantity:  6 mL        Inject 0.3 mLs (0.3 mg) into the muscle once as needed   Refills:  1        fluticasone 50 MCG/ACT spray   Commonly known as:  FLONASE   Dose:  2 spray   Quantity:  16 g        Spray 2 sprays into both nostrils daily   Refills:  11        loratadine 10 MG tablet   Commonly known as:  CLARITIN   Quantity:  " 30 tablet        TAKE 1 TABLET DAILY.   Refills:  11        montelukast 10 MG tablet   Commonly known as:  SINGULAIR   Dose:  10 mg   Indication:  Perennial Allergic Rhinitis   Quantity:  30 tablet        Take 1 tablet (10 mg) by mouth At Bedtime   Refills:  11        multivitamin, therapeutic with minerals Tabs tablet   Dose:  1 tablet        Take 1 tablet by mouth daily   Refills:  0        vitamin D 12003 UNIT capsule   Commonly known as:  ERGOCALCIFEROL   Dose:  04170 Units        Take 50,000 Units by mouth once a week   Refills:  0        * Notice:  This list has 2 medication(s) that are the same as other medications prescribed for you. Read the directions carefully, and ask your doctor or other care provider to review them with you.            Procedures and tests performed during your visit     CBC with platelets differential    Comprehensive metabolic panel    EKG 12 lead    Erythrocyte sedimentation rate auto    Head CT w/o contrast    Magnesium    Troponin I      Orders Needing Specimen Collection     None      Pending Results     Date and Time Order Name Status Description    8/27/2017 2207 Erythrocyte sedimentation rate auto In process     8/27/2017 2202 Head CT w/o contrast In process             Pending Culture Results     No orders found from 8/25/2017 to 8/28/2017.            Thank you for choosing [facility]       Thank you for choosing [facility] for your care. Our goal is always to provide you with excellent care. Hearing back from our patients is one way we can continue to improve our services. Please take a few minutes to complete the written survey that you may receive in the mail after you visit with us. Thank you!        "[employer]hart Information     [employer] lets you send messages to your doctor, view your test results, renew your prescriptions, schedule appointments and more. To sign up, go to [email or web address] . Click on \"Log in\" on the left side of the screen, which will take you to the " "Welcome page. Then click on \"Sign up Now\" on the right side of the page.     You will be asked to enter the access code listed below, as well as some personal information. Please follow the directions to create your username and password.     Your access code is: [email or web address]  Expires: 2017  3:36 PM     Your access code will  in 90 days. If you need help or a new code, please call your [place] clinic or [phone].        Care EveryWhere ID     This is your Care EveryWhere ID. This could be used by other organizations to access your [place] medical records  [id number]        Equal Access to Services     HERMINIO JI : Nash Cordova, roberto vazquez, rogelio bernal, susanna monahan. So [facility] [phone].    ATENCIÓN: Si habla español, tiene a suero disposición servicios gratuitos de asistencia lingüística. Llame al [phone].    We comply with applicable federal civil rights laws and [place] laws. We do not discriminate on the basis of race, color, national origin, age, disability sex, sexual orientation or gender identity.            After Visit Summary       This is your record. Keep this with you and show to your community pharmacist(s) and doctor(s) at your next visit.                  "

## 2017-08-28 ENCOUNTER — ALLIED HEALTH/NURSE VISIT (OUTPATIENT)
Dept: ALLERGY | Facility: OTHER | Age: 37
End: 2017-08-28
Attending: PHYSICIAN ASSISTANT
Payer: COMMERCIAL

## 2017-08-28 VITALS
TEMPERATURE: 98.4 F | SYSTOLIC BLOOD PRESSURE: 105 MMHG | OXYGEN SATURATION: 100 % | DIASTOLIC BLOOD PRESSURE: 70 MMHG | RESPIRATION RATE: 16 BRPM

## 2017-08-28 DIAGNOSIS — J30.9 ALLERGIC RHINITIS, UNSPECIFIED: Primary | ICD-10-CM

## 2017-08-28 PROCEDURE — 95115 IMMUNOTHERAPY ONE INJECTION: CPT

## 2017-08-28 NOTE — ED PROVIDER NOTES
History     Chief Complaint   Patient presents with     Numbness     around mouth and pins and needles to back of head. worsening over past 2 days      HPI  Sydni Isabel is a 36 year old female who presents to the emergency department with a 2 day history of numbness around the lips and mild dizziness.  Denies any unilateral body weakness, visual changes, trauma to the head, nausea, vomiting.    I have reviewed the Medications, Allergies, Past Medical and Surgical History, and Social History in the Epic system.    Allergies: No Known Allergies      No current facility-administered medications on file prior to encounter.   Current Outpatient Prescriptions on File Prior to Encounter:  loratadine (CLARITIN) 10 MG tablet TAKE 1 TABLET DAILY.   dexlansoprazole (DEXILANT) 60 MG CPDR CR capsule Take 60 mg by mouth daily   azelastine-fluticasone (DYMISTA) 137-50 MCG/ACT nasal spray Spray 1 spray into both nostrils 2 times daily   EPINEPHrine 0.3 MG/0.3ML injection Inject 0.3 mLs (0.3 mg) into the muscle once as needed   montelukast (SINGULAIR) 10 MG tablet Take 1 tablet (10 mg) by mouth At Bedtime   fluticasone (FLONASE) 50 MCG/ACT nasal spray Spray 2 sprays into both nostrils daily   multivitamin, therapeutic with minerals (THERA-VIT-M) TABS Take 1 tablet by mouth daily   albuterol (ALBUTEROL) 108 (90 BASE) MCG/ACT inhaler Inhale 1 puff into the lungs 3 times daily For 3-5 days.   vitamin D (ERGOCALCIFEROL) 38845 UNIT capsule Take 50,000 Units by mouth once a week    EPINEPHrine (EPIPEN) 0.3 MG/0.3ML injection Inject 0.3 mLs into the muscle once as needed for anaphylaxis for 1 dose.   ORDER FOR ALLERGEN IMMUNOTHERAPY Proceed with SCIT per standard buildup protocol.       Patient Active Problem List   Diagnosis     Threatened      ACP (advance care planning)     Allergic rhinitis     Chronic maxillary sinusitis     Chronic frontal sinusitis     DNS (deviated nasal septum)     Nasal turbinate hypertrophy  "      Past Surgical History:   Procedure Laterality Date     c section times 2       DILATION AND CURETTAGE SUCTION  7/25/2014    Procedure: DILATION AND CURETTAGE SUCTION;  Surgeon: Oliver Webb MD;  Location: [facility]     ENT SURGERY      sinus     ESOPHAGOSCOPY, GASTROSCOPY, DUODENOSCOPY (EGD), COMBINED N/A 1/8/2016    Procedure: COMBINED ESOPHAGOSCOPY, GASTROSCOPY, DUODENOSCOPY (EGD);  Surgeon: Francois Gordon MD;  Location: [facility]     GYN SURGERY  2000    d & c     wisdom teeth         Social History   Substance Use Topics     Smoking status: Never Smoker     Smokeless tobacco: Never Used     Alcohol use No         There is no immunization history on file for this patient.    BMI: Estimated body mass index is 28.08 kg/(m^2) as calculated from the following:    Height as of 7/10/17: 1.676 m (5' 6\").    Weight as of 7/10/17: 78.9 kg (174 lb).      Review of Systems   All other systems reviewed and are negative.      Physical Exam   BP: 145/75  Heart Rate: 71  Temp: 97.6  F (36.4  C)  Resp: 16  SpO2: 98 %  Physical Exam   Constitutional: She is oriented to person, place, and time. She appears well-developed and well-nourished. No distress.   HENT:   Head: Atraumatic.   Mouth/Throat: Oropharynx is clear and moist. No oropharyngeal exudate.   Eyes: Pupils are equal, round, and reactive to light. No scleral icterus.   Cardiovascular: Normal rate, regular rhythm, normal heart sounds and intact distal pulses.    Pulmonary/Chest: Breath sounds normal. No respiratory distress. She has no wheezes. She has no rales.   Abdominal: Soft. Bowel sounds are normal. She exhibits no distension. There is no tenderness. There is no rebound and no guarding.   Musculoskeletal: She exhibits no edema or tenderness.   Neurological: She is alert and oriented to person, place, and time. No cranial nerve deficit. Coordination normal.   Skin: Skin is warm. No rash noted. She is not diaphoretic.   Nursing note and vitals reviewed.      ED Course "   Patient evaluated and laboratory tests ordered.  CT scan of the head is normal.    ED Course     Procedures             EKG Interpretation:      Interpreted by Adalberto Magallanes  Time reviewed: 10:25 PM  Symptoms at time of EKG: none   Rhythm: normal sinus   Rate: normal  Axis: normal  Ectopy: none  Conduction: normal  ST Segments/ T Waves: No ST-T wave changes  Q Waves: none  Comparison to prior: No old EKG available    Clinical Impression: normal EKG            Labs Ordered and Resulted from Time of ED Arrival Up to the Time of Departure from the ED   CBC WITH PLATELETS DIFFERENTIAL   COMPREHENSIVE METABOLIC PANEL   ERYTHROCYTE SEDIMENTATION RATE AUTO   MAGNESIUM   TROPONIN I       Assessments & Plan (with Medical Decision Making)   Perioral numbness: Normal CBC, CMP, troponin, magnesium, ESR.  Normal CT scan of the brain.  Discussed results with patient.  Advised follow-up with PCP and neurologist if symptoms persist.  All questions answered.    I have reviewed the nursing notes.    I have reviewed the findings, diagnosis, plan and need for follow up with the patient.    Discharge Medication List as of 8/27/2017 11:09 PM          Final diagnoses:   Perioral numbness       8/27/2017   [facility]     Adalberto Magallanes MD  08/28/17 0047

## 2017-08-28 NOTE — NURSING NOTE
Prior to injection verified patient identity using patient's name and date of birth.  Allergy injection/s given and charted on paper allergy flow sheet.  Patient signed out AMA, did not remain for observation.        Janel White LPN

## 2017-08-28 NOTE — MR AVS SNAPSHOT
"              After Visit Summary   2017    Sydni Isabel    MRN: [id number]           Patient Information     Date Of Birth          1980        Visit Information        Provider Department      2017 9:15 AM  SHOT ROOM [facility]        Today's Diagnoses     Allergic rhinitis, unspecified    -  1       Follow-ups after your visit        Who to contact     If you have questions or need follow up information about today's clinic visit or your schedule please contact [facility] directly at [phone].  Normal or non-critical lab and imaging results will be communicated to you by MyChart, letter or phone within 4 business days after the clinic has received the results. If you do not hear from us within 7 days, please contact the clinic through [facility] or phone. If you have a critical or abnormal lab result, we will notify you by phone as soon as possible.  Submit refill requests through Avtal24 or call your pharmacy and they will forward the refill request to us. Please allow 3 business days for your refill to be completed.          Additional Information About Your Visit        MyChart Information     Avtal24 lets you send messages to your doctor, view your test results, renew your prescriptions, schedule appointments and more. To sign up, go to [email or web address] . Click on \"Log in\" on the left side of the screen, which will take you to the Welcome page. Then click on \"Sign up Now\" on the right side of the page.     You will be asked to enter the access code listed below, as well as some personal information. Please follow the directions to create your username and password.     Your access code is: [email or web address]  Expires: 2017  3:36 PM     Your access code will  in 90 days. If you need help or a new code, please call your [facility] or [phone].        Care EveryWhere ID     This is your Care EveryWhere ID. This could be used by other " organizations to access your [place] medical records  [id number]         Blood Pressure from Last 3 Encounters:   08/27/17 105/70   07/10/17 104/62   07/04/17 113/74    Weight from Last 3 Encounters:   07/10/17 174 lb (78.9 kg)   04/28/17 179 lb (81.2 kg)   08/05/16 174 lb (78.9 kg)              We Performed the Following     Allergy Shot: One injection        Primary Care Provider Office Phone # Fax #    Go LAUREL Villarreal [phone]       [facility] [address] [facility] [address]        Equal Access to Services     [facility]: Hadii aad ku hadasho Sojoanne, waaxda luqadaha, qaybta kaalmada adeegyada, waxloulou boucherin hayaan martha maier . So [facility] [phone].    ATENCIÓN: Si habla español, tiene a suero disposición servicios gratuitos de asistencia lingüística. [facility] [phone].    We comply with applicable federal civil rights laws and [place] laws. We do not discriminate on the basis of race, color, national origin, age, disability sex, sexual orientation or gender identity.            Thank you!     Thank you for choosing [facility]  for your care. Our goal is always to provide you with excellent care. Hearing back from our patients is one way we can continue to improve our services. Please take a few minutes to complete the written survey that you may receive in the mail after your visit with us. Thank you!             Your Updated Medication List - Protect others around you: Learn how to safely use, store and throw away your medicines at www.disposemymeds.org.          This list is accurate as of: 8/28/17  9:19 AM.  Always use your most recent med list.                   Brand Name Dispense Instructions for use Diagnosis    albuterol 108 (90 BASE) MCG/ACT Inhaler    PROAIR HFA    1 Inhaler    Inhale 1 puff into the lungs 3 times daily For 3-5 days.        ALLERGEN IMMUNOTHERAPY PRESCRIPTION     5 mL    Proceed with SCIT per standard buildup protocol.     Perennial allergic rhinitis       azelastine-fluticasone 137-50 MCG/ACT nasal spray    DYMISTA    2 Bottle    Spray 1 spray into both nostrils 2 times daily    Perennial allergic rhinitis with seasonal variation, Seasonal allergic rhinitis, unspecified allergic rhinitis trigger       dexlansoprazole 60 MG Cpdr CR capsule    DEXILANT     Take 60 mg by mouth daily        * EPINEPHrine 0.3 MG/0.3ML injection    EPIPEN    2 each    Inject 0.3 mLs into the muscle once as needed for anaphylaxis for 1 dose.    Chronic rhinitis       * EPINEPHrine 0.3 MG/0.3ML injection 2-pack    EPIPEN/ADRENACLICK/or ANY BX GENERIC EQUIV    6 mL    Inject 0.3 mLs (0.3 mg) into the muscle once as needed    Allergic reaction, subsequent encounter       fluticasone 50 MCG/ACT spray    FLONASE    16 g    Spray 2 sprays into both nostrils daily    Chronic rhinitis       loratadine 10 MG tablet    CLARITIN    30 tablet    TAKE 1 TABLET DAILY.    Allergic rhinitis due to pollen, Seasonal allergic rhinitis, Perennial allergic rhinitis with seasonal variation       montelukast 10 MG tablet    SINGULAIR    30 tablet    Take 1 tablet (10 mg) by mouth At Bedtime    Perennial allergic rhinitis with seasonal variation, Seasonal allergic rhinitis, Allergic rhinitis due to pollen       multivitamin, therapeutic with minerals Tabs tablet      Take 1 tablet by mouth daily        vitamin D 11893 UNIT capsule    ERGOCALCIFEROL     Take 50,000 Units by mouth once a week        * Notice:  This list has 2 medication(s) that are the same as other medications prescribed for you. Read the directions carefully, and ask your doctor or other care provider to review them with you.

## 2017-08-28 NOTE — DISCHARGE INSTRUCTIONS
Electromyogram  Your test is on (date) ________________ at (time) __________  [facility]uromuscular laboratory, [address] [place] [address]  Phone: [phone]; Fax: [phone]  What is an electromyogram (EMG)?  This test uses electrodes and a needle to measure the electrical signals of your nerves and muscles. It helps find the cause of weakness, spasms, numbness or pain in the arms, legs or face. The test helps diagnose muscle and nerve diseases.  How is the test done?  The test takes 30 to 60 minutes. Before the test, you will have a brief exam.  Part 1    We place electrodes on the skin.    An electric charge is sent along the nerve. Each charge causes a brief shock.    This allows us to measure how well the nerve carries the signal.    Part 2    The doctor inserts a fine needle into various muscle groups. There is no electric charge.    The doctor can see and hear the muscle's electrical activity on the monitors.    What if I'm feeling nervous?  Most people handle the test without a problem. We do not prescribe medicine for this test. If you feel very anxious, call your regular doctor and discuss options to help you relax.  How do I get the results?  Your regular doctor will receive a report in about a week. You will need to call your doctor.  For informational purposes only. Not to replace the advice of your health care provider.  Copyright   2015 [employer]. All rights reserved. [employer] 685295 - REV 02/17.

## 2017-08-30 ENCOUNTER — ALLIED HEALTH/NURSE VISIT (OUTPATIENT)
Dept: ALLERGY | Facility: OTHER | Age: 37
End: 2017-08-30
Attending: PHYSICIAN ASSISTANT
Payer: COMMERCIAL

## 2017-08-30 DIAGNOSIS — J30.9 ALLERGIC RHINITIS: ICD-10-CM

## 2017-08-30 PROCEDURE — 95165 ANTIGEN THERAPY SERVICES: CPT | Performed by: PHYSICIAN ASSISTANT

## 2017-08-30 PROCEDURE — 95165 ANTIGEN THERAPY SERVICES: CPT

## 2017-08-30 NOTE — MR AVS SNAPSHOT
"              After Visit Summary   8/30/2017    Sydni Isabel    MRN: [id number]           Patient Information     Date Of Birth          1980        Visit Information        Provider Department      8/30/2017 8:45 AM HC SHOT ROOM [facility]        Today's Diagnoses     Allergic rhinitis           Follow-ups after your visit        Your next 10 appointments already scheduled     Sep 08, 2017  3:30 PM CDT   injection with HC SHOT ROOM   [facility] [place] ([facility] [place] )    [address]  [place] [address]   [phone]              Who to contact     If you have questions or need follow up information about today's clinic visit or your schedule please contact [facility] directly at [phone].  Normal or non-critical lab and imaging results will be communicated to you by [employer], letter or phone within 4 business days after the clinic has received the results. If you do not hear from us within 7 days, please contact the clinic through [employer] or phone. If you have a critical or abnormal lab result, we will notify you by phone as soon as possible.  Submit refill requests through SaveUp or call your pharmacy and they will forward the refill request to us. Please allow 3 business days for your refill to be completed.          Additional Information About Your Visit        [employer] Information     SaveUp lets you send messages to your doctor, view your test results, renew your prescriptions, schedule appointments and more. To sign up, go to [email or web address] . Click on \"Log in\" on the left side of the screen, which will take you to the Welcome page. Then click on \"Sign up Now\" on the right side of the page.     You will be asked to enter the access code listed below, as well as some personal information. Please follow the directions to create your username and password.     Your access code is: [email or web address]  Expires: 11/6/2017  3:36 PM     Your " access code will  in 90 days. If you need help or a new code, please call your [place] clinic or [phone].        Care EveryWhere ID     This is your Care EveryWhere ID. This could be used by other organizations to access your [place] medical records  [id number]         Blood Pressure from Last 3 Encounters:   17 105/70   07/10/17 104/62   17 113/74    Weight from Last 3 Encounters:   07/10/17 174 lb (78.9 kg)   17 179 lb (81.2 kg)   16 174 lb (78.9 kg)              Today, you had the following     No orders found for display       Primary Care Provider Office Phone # Fax #    Go Villarreal [phone]       [facility] [address]  [facility] [address]        Equal Access to Services     [facility]: Hadii aad ku hadasho Soomaali, waaxda luqadaha, qaybta kaalmada adeegyada, waxay sanderin hayaan martha maier . So [facility] [phone].    ATENCIÓN: Si habla español, tiene a suero disposición servicios gratuitos de asistencia lingüística. Llame al [phone].    We comply with applicable federal civil rights laws and [place] laws. We do not discriminate on the basis of race, color, national origin, age, disability sex, sexual orientation or gender identity.            Thank you!     Thank you for choosing [facility]  for your care. Our goal is always to provide you with excellent care. Hearing back from our patients is one way we can continue to improve our services. Please take a few minutes to complete the written survey that you may receive in the mail after your visit with us. Thank you!             Your Updated Medication List - Protect others around you: Learn how to safely use, store and throw away your medicines at www.disposemymeds.org.          This list is accurate as of: 17 10:28 AM.  Always use your most recent med list.                   Brand Name Dispense Instructions for use Diagnosis    albuterol 108 (90  BASE) MCG/ACT Inhaler    PROAIR HFA    1 Inhaler    Inhale 1 puff into the lungs 3 times daily For 3-5 days.        ALLERGEN IMMUNOTHERAPY PRESCRIPTION     5 mL    Proceed with SCIT per standard buildup protocol.    Perennial allergic rhinitis       azelastine-fluticasone 137-50 MCG/ACT nasal spray    DYMISTA    2 Bottle    Spray 1 spray into both nostrils 2 times daily    Perennial allergic rhinitis with seasonal variation, Seasonal allergic rhinitis, unspecified allergic rhinitis trigger       dexlansoprazole 60 MG Cpdr CR capsule    DEXILANT     Take 60 mg by mouth daily        * EPINEPHrine 0.3 MG/0.3ML injection    EPIPEN    2 each    Inject 0.3 mLs into the muscle once as needed for anaphylaxis for 1 dose.    Chronic rhinitis       * EPINEPHrine 0.3 MG/0.3ML injection 2-pack    EPIPEN/ADRENACLICK/or ANY BX GENERIC EQUIV    6 mL    Inject 0.3 mLs (0.3 mg) into the muscle once as needed    Allergic reaction, subsequent encounter       fluticasone 50 MCG/ACT spray    FLONASE    16 g    Spray 2 sprays into both nostrils daily    Chronic rhinitis       loratadine 10 MG tablet    CLARITIN    30 tablet    TAKE 1 TABLET DAILY.    Allergic rhinitis due to pollen, Seasonal allergic rhinitis, Perennial allergic rhinitis with seasonal variation       montelukast 10 MG tablet    SINGULAIR    30 tablet    Take 1 tablet (10 mg) by mouth At Bedtime    Perennial allergic rhinitis with seasonal variation, Seasonal allergic rhinitis, Allergic rhinitis due to pollen       multivitamin, therapeutic with minerals Tabs tablet      Take 1 tablet by mouth daily        vitamin D 42170 UNIT capsule    ERGOCALCIFEROL     Take 50,000 Units by mouth once a week        * Notice:  This list has 2 medication(s) that are the same as other medications prescribed for you. Read the directions carefully, and ask your doctor or other care provider to review them with you.

## 2017-08-30 NOTE — PROGRESS NOTES
Allergy serum is mixed today at schedule gold 2 of 4,  into  1  (5 ml)  multi dose vial/vials.    Allergens included were:    Ragweed  0.2 ml of dilution # 0  Pigweed  0.2 ml of dilution # 0  Mugwort 0.2 ml of dilution # 0  Kochia  0.2 ml of dilution # 0  Russian Thistle 0.2 ml of dilution # 1  Viraj Grass 0.2 ml of dilution # 1  Birch mix 0.2 ml of dilution # 1  Maple Mix 0.2 of dilution # 1  Elm Mix 0.2 ml of dilution # 1  Oak Mix 0.2 ml of dilution # 1  Joey Mix 0.2 ml of dilution # 1  Pine Mix 0.2 ml of dilution # 1  Eastern Bradley 0.2 ml of dilution # 0  Black Power 0.2 ml of dilution # 0  Aspen 0.2 ml of dilution # 0  Red [place] 0.2 ml of dilution # 0    Alternaria 0.2 ml of dilution # 1  Aspergillus 0.2 ml of dilution # 0  Hormodendrum 0.2 ml of dilution # 0  Helminthosporium 0.2 ml of dilution # 0  Penicillium 0.2 ml of dilution # 1  Epicoccum 0.2 ml of dilution # 0  Fusarium 0.2 ml of dilution # 0  Mucor 0.2 ml of dilution # 0  Grain Smut 0.2 ml of dilution # 0  Grass Smut 0.2 ml of dilution # 0  Cat 0.2 ml of dilution # 1  Dog 0.2 ml of dilution # 1  Feather Mix 0.2 ml of dilution # 0  Dust Mite Mix 0.2 ml of dilution # 3  Horse 0.2 ml of dilution # 0    Ana Luisa Khan RN

## 2017-09-03 ENCOUNTER — HOSPITAL ENCOUNTER (EMERGENCY)
Facility: HOSPITAL | Age: 37
Discharge: HOME | End: 2017-09-03
Attending: INTERNAL MEDICINE | Admitting: INTERNAL MEDICINE
Payer: COMMERCIAL

## 2017-09-03 VITALS
TEMPERATURE: 99.2 F | RESPIRATION RATE: 17 BRPM | SYSTOLIC BLOOD PRESSURE: 107 MMHG | HEART RATE: 70 BPM | DIASTOLIC BLOOD PRESSURE: 72 MMHG | OXYGEN SATURATION: 100 %

## 2017-09-03 DIAGNOSIS — R07.89 ATYPICAL CHEST PAIN: ICD-10-CM

## 2017-09-03 LAB
ALBUMIN SERPL-MCNC: 3.7 G/DL (ref 3.4–5)
ALP SERPL-CCNC: 49 U/L (ref 40–150)
ALT SERPL W P-5'-P-CCNC: 19 U/L (ref 0–50)
AMYLASE SERPL-CCNC: 62 U/L (ref 30–110)
ANION GAP SERPL CALCULATED.3IONS-SCNC: 9 MMOL/L (ref 3–14)
AST SERPL W P-5'-P-CCNC: 13 U/L (ref 0–45)
BASOPHILS # BLD AUTO: 0 10E9/L (ref 0–0.2)
BASOPHILS NFR BLD AUTO: 0.6 %
BILIRUB SERPL-MCNC: 0.4 MG/DL (ref 0.2–1.3)
BUN SERPL-MCNC: 12 MG/DL (ref 7–30)
CALCIUM SERPL-MCNC: 9.1 MG/DL (ref 8.5–10.1)
CHLORIDE SERPL-SCNC: 105 MMOL/L (ref 94–109)
CO2 SERPL-SCNC: 28 MMOL/L (ref 20–32)
CREAT SERPL-MCNC: 0.74 MG/DL (ref 0.52–1.04)
DIFFERENTIAL METHOD BLD: NORMAL
EOSINOPHIL # BLD AUTO: 0.2 10E9/L (ref 0–0.7)
EOSINOPHIL NFR BLD AUTO: 3.3 %
ERYTHROCYTE [DISTWIDTH] IN BLOOD BY AUTOMATED COUNT: 12.2 % (ref 10–15)
GFR SERPL CREATININE-BSD FRML MDRD: 88 ML/MIN/1.7M2
GLUCOSE SERPL-MCNC: 95 MG/DL (ref 70–99)
HCT VFR BLD AUTO: 40.3 % (ref 35–47)
HGB BLD-MCNC: 13.8 G/DL (ref 11.7–15.7)
IMM GRANULOCYTES # BLD: 0 10E9/L (ref 0–0.4)
IMM GRANULOCYTES NFR BLD: 0.3 %
LIPASE SERPL-CCNC: 260 U/L (ref 73–393)
LYMPHOCYTES # BLD AUTO: 2.7 10E9/L (ref 0.8–5.3)
LYMPHOCYTES NFR BLD AUTO: 41.7 %
MCH RBC QN AUTO: 29.9 PG (ref 26.5–33)
MCHC RBC AUTO-ENTMCNC: 34.2 G/DL (ref 31.5–36.5)
MCV RBC AUTO: 87 FL (ref 78–100)
MONOCYTES # BLD AUTO: 0.4 10E9/L (ref 0–1.3)
MONOCYTES NFR BLD AUTO: 6.9 %
NEUTROPHILS # BLD AUTO: 3 10E9/L (ref 1.6–8.3)
NEUTROPHILS NFR BLD AUTO: 47.2 %
NRBC # BLD AUTO: 0 10*3/UL
NRBC BLD AUTO-RTO: 0 /100
PLATELET # BLD AUTO: 240 10E9/L (ref 150–450)
POTASSIUM SERPL-SCNC: 4.1 MMOL/L (ref 3.4–5.3)
PROT SERPL-MCNC: 7.4 G/DL (ref 6.8–8.8)
RBC # BLD AUTO: 4.61 10E12/L (ref 3.8–5.2)
SODIUM SERPL-SCNC: 142 MMOL/L (ref 133–144)
TROPONIN I SERPL-MCNC: <0.015 UG/L (ref 0–0.04)
WBC # BLD AUTO: 6.4 10E9/L (ref 4–11)

## 2017-09-03 PROCEDURE — 25000132 ZZH RX MED GY IP 250 OP 250 PS 637: Performed by: INTERNAL MEDICINE

## 2017-09-03 PROCEDURE — 25000125 ZZHC RX 250: Performed by: INTERNAL MEDICINE

## 2017-09-03 PROCEDURE — 99285 EMERGENCY DEPT VISIT HI MDM: CPT | Mod: 25

## 2017-09-03 PROCEDURE — 36415 COLL VENOUS BLD VENIPUNCTURE: CPT | Performed by: INTERNAL MEDICINE

## 2017-09-03 PROCEDURE — 99285 EMERGENCY DEPT VISIT HI MDM: CPT | Performed by: INTERNAL MEDICINE

## 2017-09-03 PROCEDURE — 83690 ASSAY OF LIPASE: CPT | Performed by: INTERNAL MEDICINE

## 2017-09-03 PROCEDURE — 82150 ASSAY OF AMYLASE: CPT | Performed by: INTERNAL MEDICINE

## 2017-09-03 PROCEDURE — 71020 ZZHC CHEST TWO VIEWS, FRONT/LAT: CPT | Mod: TC

## 2017-09-03 PROCEDURE — 80053 COMPREHEN METABOLIC PANEL: CPT | Performed by: INTERNAL MEDICINE

## 2017-09-03 PROCEDURE — 96374 THER/PROPH/DIAG INJ IV PUSH: CPT

## 2017-09-03 PROCEDURE — 85025 COMPLETE CBC W/AUTO DIFF WBC: CPT | Performed by: INTERNAL MEDICINE

## 2017-09-03 PROCEDURE — 93005 ELECTROCARDIOGRAM TRACING: CPT

## 2017-09-03 PROCEDURE — S0028 INJECTION, FAMOTIDINE, 20 MG: HCPCS | Performed by: INTERNAL MEDICINE

## 2017-09-03 PROCEDURE — 93010 ELECTROCARDIOGRAM REPORT: CPT | Performed by: INTERNAL MEDICINE

## 2017-09-03 PROCEDURE — 84484 ASSAY OF TROPONIN QUANT: CPT | Performed by: INTERNAL MEDICINE

## 2017-09-03 RX ORDER — ACETAMINOPHEN 325 MG/1
975 TABLET ORAL ONCE
Status: COMPLETED | OUTPATIENT
Start: 2017-09-03 | End: 2017-09-03

## 2017-09-03 RX ADMIN — LIDOCAINE HYDROCHLORIDE 30 ML: 20 SOLUTION ORAL; TOPICAL at 21:46

## 2017-09-03 RX ADMIN — FAMOTIDINE 20 MG: 10 INJECTION, SOLUTION INTRAVENOUS at 21:46

## 2017-09-03 RX ADMIN — ACETAMINOPHEN 975 MG: 325 TABLET, FILM COATED ORAL at 23:14

## 2017-09-03 NOTE — ED AVS SNAPSHOT
[facility]    [address] [address]    Phone:  [phone]                                       Sydni Isabel   MRN: [id number]    Department:  [facility]   Date of Visit:  9/3/2017           After Visit Summary Signature Page     I have received my discharge instructions, and my questions have been answered. I have discussed any challenges I see with this plan with the nurse or doctor.    ..........................................................................................................................................  Patient/Patient Representative Signature      ..........................................................................................................................................  Patient Representative Print Name and Relationship to Patient    ..................................................               ................................................  Date                                            Time    ..........................................................................................................................................  Reviewed by Signature/Title    ...................................................              ..............................................  Date                                                            Time

## 2017-09-03 NOTE — ED AVS SNAPSHOT
[facility]    [address] [address]    Phone:  [phone]                                       Sydni Isabel   MRN: [id number]    Department:  [facility]   Date of Visit:  9/3/2017           Patient Information     Date Of Birth          1980        Your diagnoses for this visit were:     Atypical chest pain        You were seen by Zhang Fung MD.      Follow-up Information     Follow up with Go Villarreal MD.    Specialty:  Family Practice    Contact information:    [facility]  [address] [place] [address]  [phone]          Discharge Instructions          *CHEST PAIN, UNCERTAIN CAUSE    Based on your exam today, the exact cause of your chest pain is not certain. Your condition does not seem serious at this time, and your pain does not appear to be coming from your heart. However, sometimes the signs of a serious problem take more time to appear. Therefore, watch for the warning signs listed below.  HOME CARE:  1. Rest today and avoid strenuous activity.  2. Take any prescribed medicine as directed.  FOLLOW UP with your doctor in 1-3 days.   GET PROMPT MEDICAL ATTENTION if any of the following occur:    A change in the type of pain: if it feels different, becomes more severe, lasts longer, or begins to spread into your shoulder, arm, neck, jaw or back    Shortness of breath or increased pain with breathing    Weakness, dizziness, or fainting    Cough with blood or dark colored sputum (phlegm)    Fever over 101  F (38.3  C)    Swelling, pain or redness in one leg    9059-8945 [place] [address]. All rights reserved. This information is not intended as a substitute for professional medical care. Always follow your healthcare professional's instructions.      Future Appointments        Provider Department Dept Phone Center    9/8/2017 3:30 PM  SHOT ROOM [facility] [phone]  [place]         Review of your medicines      Our records show that you are taking the medicines listed below. If these are incorrect, please call your family doctor or clinic.        Dose / Directions Last dose taken    albuterol 108 (90 BASE) MCG/ACT Inhaler   Commonly known as:  PROAIR HFA   Dose:  1 puff   Quantity:  1 Inhaler        Inhale 1 puff into the lungs 3 times daily For 3-5 days.   Refills:  0        ALLERGEN IMMUNOTHERAPY PRESCRIPTION   Quantity:  5 mL        Proceed with SCIT per standard buildup protocol.   Refills:  PRN        azelastine-fluticasone 137-50 MCG/ACT nasal spray   Commonly known as:  DYMISTA   Dose:  1 spray   Quantity:  2 Bottle        Spray 1 spray into both nostrils 2 times daily   Refills:  3        dexlansoprazole 60 MG Cpdr CR capsule   Commonly known as:  DEXILANT   Dose:  60 mg        Take 60 mg by mouth daily   Refills:  0        * EPINEPHrine 0.3 MG/0.3ML injection   Commonly known as:  EPIPEN   Dose:  0.3 mg   Quantity:  2 each        Inject 0.3 mLs into the muscle once as needed for anaphylaxis for 1 dose.   Refills:  3        * EPINEPHrine 0.3 MG/0.3ML injection 2-pack   Commonly known as:  EPIPEN/ADRENACLICK/or ANY BX GENERIC EQUIV   Dose:  0.3 mg   Quantity:  6 mL        Inject 0.3 mLs (0.3 mg) into the muscle once as needed   Refills:  1        fluticasone 50 MCG/ACT spray   Commonly known as:  FLONASE   Dose:  2 spray   Quantity:  16 g        Spray 2 sprays into both nostrils daily   Refills:  11        loratadine 10 MG tablet   Commonly known as:  CLARITIN   Quantity:  30 tablet        TAKE 1 TABLET DAILY.   Refills:  11        montelukast 10 MG tablet   Commonly known as:  SINGULAIR   Dose:  10 mg   Indication:  Perennial Allergic Rhinitis   Quantity:  30 tablet        Take 1 tablet (10 mg) by mouth At Bedtime   Refills:  11        multivitamin, therapeutic with minerals Tabs tablet   Dose:  1 tablet        Take 1 tablet by mouth daily   Refills:  0         vitamin D 18288 UNIT capsule   Commonly known as:  ERGOCALCIFEROL   Dose:  2000 Units        Take 2,000 Units by mouth daily   Refills:  0        * Notice:  This list has 2 medication(s) that are the same as other medications prescribed for you. Read the directions carefully, and ask your doctor or other care provider to review them with you.            Procedures and tests performed during your visit     Amylase    CBC with platelets differential    Comprehensive metabolic panel    EKG 12 lead    Lipase    Troponin I    XR Chest 2 Views      Orders Needing Specimen Collection     None      Pending Results     Date and Time Order Name Status Description    9/3/2017 2130 XR Chest 2 Views In process             Pending Culture Results     No orders found from 9/1/2017 to 9/4/2017.            Thank you for choosing [facility]       Thank you for choosing [facility] for your care. Our goal is always to provide you with excellent care. Hearing back from our patients is one way we can continue to improve our services. Please take a few minutes to complete the written survey that you may receive in the mail after you visit with us. Thank you!        [employer] Information     [employer] gives you secure access to your electronic health record. If you see a primary care provider, you can also send messages to your care team and make appointments. If you have questions, please call your primary care clinic.  If you do not have a primary care provider, please call [phone] and they will assist you.        Care EveryWhere ID     This is your Care EveryWhere ID. This could be used by other organizations to access your [facility] medical records  [id number]        Equal Access to Services     HERMINIO JI : Hadii alonzo Cordova, roberto vazquez, susanna luciano . [facility] [phone].    ATENCIÓN: Si habla español, tiene a suero disposición servicios gratuitos de asistencia  david Bookerspeedy al [phone].    We comply with applicable federal civil rights laws and [place] laws. We do not discriminate on the basis of race, color, national origin, age, disability sex, sexual orientation or gender identity.            After Visit Summary       This is your record. Keep this with you and show to your community pharmacist(s) and doctor(s) at your next visit.

## 2017-09-04 ASSESSMENT — ENCOUNTER SYMPTOMS
PALPITATIONS: 0
LIGHT-HEADEDNESS: 0
NAUSEA: 0
DIAPHORESIS: 0
COLOR CHANGE: 0
FEVER: 0
NECK STIFFNESS: 0
ABDOMINAL PAIN: 0
ABDOMINAL DISTENTION: 0
BACK PAIN: 0
MYALGIAS: 0
HEADACHES: 0
BLOOD IN STOOL: 0
CHEST TIGHTNESS: 0
COUGH: 0
DIZZINESS: 1
NUMBNESS: 0
ARTHRALGIAS: 0
CHILLS: 0
VOICE CHANGE: 0
SHORTNESS OF BREATH: 0
VOMITING: 0
DYSURIA: 0
WHEEZING: 0
HEMATURIA: 0
ANAL BLEEDING: 0
FLANK PAIN: 0
NECK PAIN: 0
CONFUSION: 0
WOUND: 0

## 2017-09-04 NOTE — ED NOTES
Ambulated into ED room 7 independently with c/o dizziness that started this morning and chest pain that started this afternoon. States chest pain is mid-sternal, goes into right arm, and is a sharp pain rated 5/10. States chest pain is worse when coughing. Denies nausea, vomiting, or fevers. Denies taking any medications. Call light within reach.

## 2017-09-04 NOTE — ED PROVIDER NOTES
History     Chief Complaint   Patient presents with     Chest Pain     c/o sharp mid-sternal chest pain rated 5/10 that started this afternoon suddenly. Worse with coughing.     Dizziness     c/o dizziness that started this morning.     Patient is a 36 year old female presenting with chest pain. The history is provided by the patient.   Chest Pain   Pain location:  Substernal area  Pain quality: sharp    Pain radiates to:  R shoulder  Onset quality:  Sudden  Timing:  Constant  Chronicity:  New  Context: breathing    Associated symptoms: dizziness    Associated symptoms: no abdominal pain, no back pain, no cough, no diaphoresis, no fever, no headache, no nausea, no numbness, no palpitations, no shortness of breath and no vomiting        I have reviewed the Medications, Allergies, Past Medical and Surgical History, and Social History in the Epic system.        Review of Systems   Constitutional: Negative for chills, diaphoresis and fever.   HENT: Negative for voice change.    Eyes: Negative for visual disturbance.   Respiratory: Negative for cough, chest tightness, shortness of breath and wheezing.    Cardiovascular: Positive for chest pain. Negative for palpitations and leg swelling.   Gastrointestinal: Negative for abdominal distention, abdominal pain, anal bleeding, blood in stool, nausea and vomiting.   Genitourinary: Negative for decreased urine volume, dysuria, flank pain and hematuria.   Musculoskeletal: Negative for arthralgias, back pain, gait problem, myalgias, neck pain and neck stiffness.   Skin: Negative for color change, pallor, rash and wound.   Neurological: Positive for dizziness. Negative for syncope, light-headedness, numbness and headaches.   Psychiatric/Behavioral: Negative for confusion and suicidal ideas.       Physical Exam   BP: 111/72  Pulse: 74  Heart Rate: 68  Temp: 98.7  F (37.1  C)  Resp: 17  SpO2: 100 %  Physical Exam   Constitutional: She is oriented to person, place, and time. She  appears well-developed and well-nourished.   HENT:   Head: Normocephalic and atraumatic.   Mouth/Throat: No oropharyngeal exudate.   Eyes: Conjunctivae are normal. Pupils are equal, round, and reactive to light.   Neck: Normal range of motion. Neck supple. No JVD present. No tracheal deviation present. No thyromegaly present.   Cardiovascular: Normal rate, regular rhythm, normal heart sounds and intact distal pulses.  Exam reveals no gallop and no friction rub.    No murmur heard.  Pulmonary/Chest: Effort normal and breath sounds normal. No stridor. No respiratory distress. She has no wheezes. She has no rales. She exhibits no tenderness.   Abdominal: Soft. Bowel sounds are normal. She exhibits no distension and no mass. There is no tenderness. There is no rebound and no guarding.   Musculoskeletal: Normal range of motion. She exhibits no edema or tenderness.   Lymphadenopathy:     She has no cervical adenopathy.   Neurological: She is alert and oriented to person, place, and time.   Skin: Skin is warm and dry. No rash noted. No erythema. No pallor.   Psychiatric: Her behavior is normal.   Nursing note and vitals reviewed.      ED Course     ED Course     Procedures                 Labs Ordered and Resulted from Time of ED Arrival Up to the Time of Departure from the ED   CBC WITH PLATELETS DIFFERENTIAL   LIPASE   AMYLASE   TROPONIN I   COMPREHENSIVE METABOLIC PANEL       Assessments & Plan (with Medical Decision Making)   Atypical substernal chest pain with radiation to R shoulder and epigstrium  Mild tenderness in epigastrium  Hx of simiar pain, GERD  CXR: no acute finding  Partially responded to antiacid  EKG: NSR  Chest pain of uncertain etiology  I advised her to return to ER if symptoms persisted, fu with PCP for further evaluation  I have reviewed the nursing notes.    I have reviewed the findings, diagnosis, plan and need for follow up with the patient.      Discharge Medication List as of 9/3/2017 11:39 PM           Final diagnoses:   Atypical chest pain       9/3/2017   [facility]     Zhang Fung MD  09/04/17 [id number]

## 2017-09-04 NOTE — ED NOTES
States pain is the same. Discharge instructions given. Verbalized understanding. Ambulated out of ED independently.

## 2017-09-04 NOTE — DISCHARGE INSTRUCTIONS
*CHEST PAIN, UNCERTAIN CAUSE    Based on your exam today, the exact cause of your chest pain is not certain. Your condition does not seem serious at this time, and your pain does not appear to be coming from your heart. However, sometimes the signs of a serious problem take more time to appear. Therefore, watch for the warning signs listed below.  HOME CARE:  1. Rest today and avoid strenuous activity.  2. Take any prescribed medicine as directed.  FOLLOW UP with your doctor in 1-3 days.   GET PROMPT MEDICAL ATTENTION if any of the following occur:    A change in the type of pain: if it feels different, becomes more severe, lasts longer, or begins to spread into your shoulder, arm, neck, jaw or back    Shortness of breath or increased pain with breathing    Weakness, dizziness, or fainting    Cough with blood or dark colored sputum (phlegm)    Fever over 101  F (38.3  C)    Swelling, pain or redness in one leg    [id number] [address], [place] [address]. All rights reserved. This information is not intended as a substitute for professional medical care. Always follow your healthcare professional's instructions.

## 2017-09-04 NOTE — ED NOTES
"Pt is back from Radiology; pt states the pain is still there but her \"lips feels numb from whatever cocktail they gave me\"  "

## 2017-09-08 ENCOUNTER — ALLIED HEALTH/NURSE VISIT (OUTPATIENT)
Dept: ALLERGY | Facility: OTHER | Age: 37
End: 2017-09-08
Attending: PHYSICIAN ASSISTANT
Payer: COMMERCIAL

## 2017-09-08 DIAGNOSIS — J30.9 ALLERGIC RHINITIS, UNSPECIFIED: Primary | ICD-10-CM

## 2017-09-08 PROCEDURE — 95115 IMMUNOTHERAPY ONE INJECTION: CPT

## 2017-09-08 NOTE — MR AVS SNAPSHOT
After Visit Summary   9/8/2017    Sydni Isabel    MRN: [id number]           Patient Information     Date Of Birth          1980        Visit Information        Provider Department      9/8/2017 3:30 PM [facility]OT ROOM [facility]        Today's Diagnoses     Allergic rhinitis, unspecified    -  1       Follow-ups after your visit        Your next 10 appointments already scheduled     Sep 15, 2017  4:15 PM CDT   injection with HC SHOT ROOM   [facility] [place] ([facility] [place] [facility] [place] )    [address]  [place] [address]   [phone]              Who to contact     If you have questions or need follow up information about today's clinic visit or your schedule please contact [facility] directly at [phone].  Normal or non-critical lab and imaging results will be communicated to you by MyChart, letter or phone within 4 business days after the clinic has received the results. If you do not hear from us within 7 days, please contact the clinic through MyChart or phone. If you have a critical or abnormal lab result, we will notify you by phone as soon as possible.  Submit refill requests through [employer] or call your pharmacy and they will forward the refill request to us. Please allow 3 business days for your refill to be completed.          Additional Information About Your Visit        MyChart Information     [employer] gives you secure access to your electronic health record. If you see a primary care provider, you can also send messages to your care team and make appointments. If you have questions, please call your primary care clinic.  If you do not have a primary care provider, please call [phone] and they will assist you.        Care EveryWhere ID     This is your Care EveryWhere ID. This could be used by other organizations to access your [place] medical records  [id number]         Blood Pressure from Last 3 Encounters:   09/03/17 107/72    08/27/17 105/70   07/10/17 104/62    Weight from Last 3 Encounters:   07/10/17 174 lb (78.9 kg)   04/28/17 179 lb (81.2 kg)   08/05/16 174 lb (78.9 kg)              We Performed the Following     Allergy Shot: One injection        Primary Care Provider Office Phone # Fax #    Go Villarreal [phone]       [facility] [address]  [facility] [address]        Equal Access to Services     [facility]: Hadii aad ku hadasho Soomaali, waaxda luqadaha, qaybta kaalmada adeegyada, waxay idiin hayaan adeeg kharaerica maier . So [facility] [phone].    ATENCIÓN: Si habla español, tiene a suero disposición servicios gratuitos de asistencia lingüística. Ll[facility] [phone].    We comply with applicable federal civil rights laws and [place] laws. We do not discriminate on the basis of race, color, national origin, age, disability sex, sexual orientation or gender identity.            Thank you!     Thank you for choosing [facility]  for your care. Our goal is always to provide you with excellent care. Hearing back from our patients is one way we can continue to improve our services. Please take a few minutes to complete the written survey that you may receive in the mail after your visit with us. Thank you!             Your Updated Medication List - Protect others around you: Learn how to safely use, store and throw away your medicines at www.disposemymeds.org.          This list is accurate as of: 9/8/17  4:05 PM.  Always use your most recent med list.                   Brand Name Dispense Instructions for use Diagnosis    albuterol 108 (90 BASE) MCG/ACT Inhaler    PROAIR HFA    1 Inhaler    Inhale 1 puff into the lungs 3 times daily For 3-5 days.        ALLERGEN IMMUNOTHERAPY PRESCRIPTION     5 mL    Proceed with SCIT per standard buildup protocol.    Perennial allergic rhinitis       azelastine-fluticasone 137-50 MCG/ACT nasal spray    DYMISTA    2 Bottle    Spray 1 spray into  both nostrils 2 times daily    Perennial allergic rhinitis with seasonal variation, Seasonal allergic rhinitis, unspecified allergic rhinitis trigger       dexlansoprazole 60 MG Cpdr CR capsule    DEXILANT     Take 60 mg by mouth daily        * EPINEPHrine 0.3 MG/0.3ML injection    EPIPEN    2 each    Inject 0.3 mLs into the muscle once as needed for anaphylaxis for 1 dose.    Chronic rhinitis       * EPINEPHrine 0.3 MG/0.3ML injection 2-pack    EPIPEN/ADRENACLICK/or ANY BX GENERIC EQUIV    6 mL    Inject 0.3 mLs (0.3 mg) into the muscle once as needed    Allergic reaction, subsequent encounter       fluticasone 50 MCG/ACT spray    FLONASE    16 g    Spray 2 sprays into both nostrils daily    Chronic rhinitis       loratadine 10 MG tablet    CLARITIN    30 tablet    TAKE 1 TABLET DAILY.    Allergic rhinitis due to pollen, Seasonal allergic rhinitis, Perennial allergic rhinitis with seasonal variation       montelukast 10 MG tablet    SINGULAIR    30 tablet    Take 1 tablet (10 mg) by mouth At Bedtime    Perennial allergic rhinitis with seasonal variation, Seasonal allergic rhinitis, Allergic rhinitis due to pollen       multivitamin, therapeutic with minerals Tabs tablet      Take 1 tablet by mouth daily        vitamin D 26993 UNIT capsule    ERGOCALCIFEROL     Take 2,000 Units by mouth daily        * Notice:  This list has 2 medication(s) that are the same as other medications prescribed for you. Read the directions carefully, and ask your doctor or other care provider to review them with you.

## 2017-09-08 NOTE — PROGRESS NOTES
Prior to injection verified patient identity using patient's name and date of birth.    Safety Vial Test was done in  left arm for new gold vial.  Administered 0.01 ml (ID) for SVT.   SVT = 10mm.    Passed.    Allergy injection given and charted on the paper flow sheet.      Per orders of RANDELL Machado, an allergy injections is given by Ana Luisa Khan RN.     The patient is instructed to remain in clinic for 30 minutes after injection, and to report any adverse reaction to me immediately.    Patient signed out AMA and did not remain for observation.  Ana Luisa Khan RN

## 2017-09-10 ENCOUNTER — MYC MEDICAL ADVICE (OUTPATIENT)
Dept: OTOLARYNGOLOGY | Facility: OTHER | Age: 37
End: 2017-09-10

## 2017-09-12 NOTE — TELEPHONE ENCOUNTER
Would recommend audiogram- Continue w. Rinse/ nasal spray. Should have a recheck of ears/ throat prior to surgery.   Would observe tonsils at this time, at f/u could discuss symptoms/ illnesses.   TR

## 2017-09-18 ENCOUNTER — ALLIED HEALTH/NURSE VISIT (OUTPATIENT)
Dept: ALLERGY | Facility: OTHER | Age: 37
End: 2017-09-18
Attending: PHYSICIAN ASSISTANT
Payer: COMMERCIAL

## 2017-09-18 DIAGNOSIS — J30.9 ALLERGIC RHINITIS, UNSPECIFIED: Primary | ICD-10-CM

## 2017-09-18 PROCEDURE — 95115 IMMUNOTHERAPY ONE INJECTION: CPT

## 2017-09-18 NOTE — MR AVS SNAPSHOT
After Visit Summary   9/18/2017    Sydni Isabel    MRN: [id number]           Patient Information     Date Of Birth          1980        Visit Information        Provider Department      9/18/2017 1:30 PM HC SHOT ROOM [place] [facility]        Today's Diagnoses     Allergic rhinitis, unspecified    -  1       Follow-ups after your visit        Your next 10 appointments already scheduled     Oct 16, 2017  9:15 AM CDT   (Arrive by 9:00 AM)   Return Visit with Yulisa Quiroz MD   [facility] [place] ([facility] [place] )    [address]  [place] [address]   [phone]              Who to contact     If you have questions or need follow up information about today's clinic visit or your schedule please contact [facility] [place] directly at [phone].  Normal or non-critical lab and imaging results will be communicated to you by MyChart, letter or phone within 4 business days after the clinic has received the results. If you do not hear from us within 7 days, please contact the clinic through MyChart or phone. If you have a critical or abnormal lab result, we will notify you by phone as soon as possible.  Submit refill requests through [employer] or call your pharmacy and they will forward the refill request to us. Please allow 3 business days for your refill to be completed.          Additional Information About Your Visit        MyChart Information     [employer] gives you secure access to your electronic health record. If you see a primary care provider, you can also send messages to your care team and make appointments. If you have questions, please call your primary care clinic.  If you do not have a primary care provider, please call [phone] and they will assist you.        Care EveryWhere ID     This is your Care EveryWhere ID. This could be used by other organizations to access your [place] medical records  [id number]         Blood Pressure from  Last 3 Encounters:   09/03/17 107/72   08/27/17 105/70   07/10/17 104/62    Weight from Last 3 Encounters:   07/10/17 174 lb (78.9 kg)   04/28/17 179 lb (81.2 kg)   08/05/16 174 lb (78.9 kg)              We Performed the Following     Allergy Shot: One injection        Primary Care Provider Office Phone # Fax #    Go LAUREL Villarreal [phone]       [facility] [address]  [facility] [address]        Equal Access to Services     [facility]: Hadii aad ku hadasho Soomaali, waaxda luqadaha, qaybta kaalmada adeegyada, waxloulou boucherin hayaan martha maier . So [facility] [phone].    ATENCIÓN: Si habla español, tiene a suero disposición servicios gratuitos de asistencia lingüística. LlD[facility] Ridge Medical [facility] [phone].    We comply with applicable federal civil rights laws and [place] laws. We do not discriminate on the basis of race, color, national origin, age, disability sex, sexual orientation or gender identity.            Thank you!     Thank you for choosing [facility]  for your care. Our goal is always to provide you with excellent care. Hearing back from our patients is one way we can continue to improve our services. Please take a few minutes to complete the written survey that you may receive in the mail after your visit with us. Thank you!             Your Updated Medication List - Protect others around you: Learn how to safely use, store and throw away your medicines at www.disposemymeds.org.          This list is accurate as of: 9/18/17  1:56 PM.  Always use your most recent med list.                   Brand Name Dispense Instructions for use Diagnosis    albuterol 108 (90 BASE) MCG/ACT Inhaler    PROAIR HFA    1 Inhaler    Inhale 1 puff into the lungs 3 times daily For 3-5 days.        ALLERGEN IMMUNOTHERAPY PRESCRIPTION     5 mL    Proceed with SCIT per standard buildup protocol.    Perennial allergic rhinitis       azelastine-fluticasone 137-50 MCG/ACT nasal spray    DYMISTA     2 Bottle    Spray 1 spray into both nostrils 2 times daily    Perennial allergic rhinitis with seasonal variation, Seasonal allergic rhinitis, unspecified allergic rhinitis trigger       dexlansoprazole 60 MG Cpdr CR capsule    DEXILANT     Take 60 mg by mouth daily        * EPINEPHrine 0.3 MG/0.3ML injection    EPIPEN    2 each    Inject 0.3 mLs into the muscle once as needed for anaphylaxis for 1 dose.    Chronic rhinitis       * EPINEPHrine 0.3 MG/0.3ML injection 2-pack    EPIPEN/ADRENACLICK/or ANY BX GENERIC EQUIV    6 mL    Inject 0.3 mLs (0.3 mg) into the muscle once as needed    Allergic reaction, subsequent encounter       fluticasone 50 MCG/ACT spray    FLONASE    16 g    Spray 2 sprays into both nostrils daily    Chronic rhinitis       loratadine 10 MG tablet    CLARITIN    30 tablet    TAKE 1 TABLET DAILY.    Allergic rhinitis due to pollen, Seasonal allergic rhinitis, Perennial allergic rhinitis with seasonal variation       montelukast 10 MG tablet    SINGULAIR    30 tablet    Take 1 tablet (10 mg) by mouth At Bedtime    Perennial allergic rhinitis with seasonal variation, Seasonal allergic rhinitis, Allergic rhinitis due to pollen       multivitamin, therapeutic with minerals Tabs tablet      Take 1 tablet by mouth daily        vitamin D 17574 UNIT capsule    ERGOCALCIFEROL     Take 2,000 Units by mouth daily        * Notice:  This list has 2 medication(s) that are the same as other medications prescribed for you. Read the directions carefully, and ask your doctor or other care provider to review them with you.

## 2017-09-18 NOTE — PROGRESS NOTES
Prior to injection verified patient identity using patient's name and date of birth.    Per orders of RANDELL Basilio, allergy injection was given.    1 allergy injection was given and charted in the allergy flow sheet.    The patient was instructed to remain in the clinic for 30 minutes after the injection, to report any adverse reactions.    The patient signed out AMA and did not remain for observation.    Apoorva Ryan RN

## 2017-09-26 ENCOUNTER — OFFICE VISIT (OUTPATIENT)
Dept: OTOLARYNGOLOGY | Facility: OTHER | Age: 37
End: 2017-09-26
Attending: OTOLARYNGOLOGY
Payer: COMMERCIAL

## 2017-09-26 VITALS
HEART RATE: 84 BPM | DIASTOLIC BLOOD PRESSURE: 64 MMHG | TEMPERATURE: 99.1 F | OXYGEN SATURATION: 99 % | SYSTOLIC BLOOD PRESSURE: 100 MMHG | WEIGHT: 170 LBS | RESPIRATION RATE: 16 BRPM | BODY MASS INDEX: 27.32 KG/M2 | HEIGHT: 66 IN

## 2017-09-26 DIAGNOSIS — J30.2 PERENNIAL ALLERGIC RHINITIS WITH SEASONAL VARIATION: ICD-10-CM

## 2017-09-26 DIAGNOSIS — R51.9 FACIAL PAIN: ICD-10-CM

## 2017-09-26 DIAGNOSIS — J34.829 NASAL VALVE COLLAPSE: ICD-10-CM

## 2017-09-26 DIAGNOSIS — J34.3 NASAL TURBINATE HYPERTROPHY: ICD-10-CM

## 2017-09-26 DIAGNOSIS — H69.93 DYSFUNCTION OF EUSTACHIAN TUBE, BILATERAL: ICD-10-CM

## 2017-09-26 DIAGNOSIS — J34.2 DNS (DEVIATED NASAL SEPTUM): ICD-10-CM

## 2017-09-26 DIAGNOSIS — J32.4 CHRONIC PANSINUSITIS: Primary | ICD-10-CM

## 2017-09-26 DIAGNOSIS — J30.89 PERENNIAL ALLERGIC RHINITIS WITH SEASONAL VARIATION: ICD-10-CM

## 2017-09-26 PROCEDURE — 99212 OFFICE O/P EST SF 10 MIN: CPT

## 2017-09-26 PROCEDURE — 99214 OFFICE O/P EST MOD 30 MIN: CPT | Performed by: OTOLARYNGOLOGY

## 2017-09-26 ASSESSMENT — PAIN SCALES - GENERAL: PAINLEVEL: NO PAIN (0)

## 2017-09-26 NOTE — MR AVS SNAPSHOT
After Visit Summary   9/26/2017    Sydni Isabel    MRN: [id number]           Patient Information     Date Of Birth          1980        Visit Information        Provider Department      9/26/2017 1:15 PM Yulisa Quiroz MD [facility] [place]        Today's Diagnoses     ETD (eustachian tube dysfunction)    -  1      Care Instructions    Tonsils- likely tonsil stones. Use Salt water gargles as needed  Schedule sinus surgery for November.     Continue with allergy shots.   Continue with Claritin, Singulair (stop in 1 month)   Ears look well.   Thank you for allowing Dr. Quiroz and our ENT team to participate in your care.  If you have a scheduling or an appointment question please contact [facility] Unit Coordinator at their direct line [phone].   ALL nursing questions or concerns can be directed to your ENT nurse at: [phone] - Patrizia    Instructions for Sinus Surgery    Recovery - Everyone recovers differently from a general anesthetic.  Symptoms such as fatigue, nausea, light-headedness, and sometimes a low grade fever (up to 100 degrees) are not unusual.  As your body removes the anesthetic drugs from circulation, these symptoms will resolve.  Your nose will be sore after surgery, and you may even have symptoms similar to a sinus infection with headache, congestion, and pressure.  These will resolve with healing.  For several days you may experience bloody drainage from the nose, please use the drip pad as necessary for this.  If there is persistent bleeding, please call the office during business hours or the on call ENT physician after hours.  There are no diet restrictions after sinus surgery, and you can resume your home medications.      Please do not blow your nose until two weeks after surgery.  At 2 weeks you may gently blow your nose, unless otherwise indicated by Dr. Quiroz.     Limit your activity to no strenuous activities until I see you for  the first follow-up visit in approximately 2 weeks.      Medications - You were sent home with narcotic pain medication.  If you can tolerate the discomfort during your recovery by using just plain Tylenol or ibuprofen (advil), please do so.  However, do not hesitate to use the stronger pain medication if needed.  If you were sent home with an antibiotic, it is primarily used to help the healing process.  If it causes loose bowel movements or other signs of intolerance, it is appropriate to discontinue it.  By far the most important measure you can take to speed recovery, and maximize the chances of long term success of sinus surgery is using the sinus rinses at least three time per day for the first month after surgery.       Start Rosalba Med saline irrigation tonight and use at least 5 times daily.     1.  You will need to purchase 2 rosalba med bottles.  Make each bottle the same way, using warm distilled water filled up to the 240 ml lana, dissolve 2 packets of salt and mix into each bottle    2.  In one bottle, add the budesonide (steroid), shake gently to dissolve, and irrigate each nostril using the entire bottle divided between nostrils.  Each day you will use two entire bottle of the budesonide solution and will remake this the next day.     3.  In the other rosalba med bottle use only the saline solution, and irrigate with this at least 3 additional times daily.    Perform gentle irrigation for the first week.  Starting 1 week after surgery, you should increase the volume of rosalba med saline irrigation to each nostril, continuing to use the rinses in an alternating fashion at least 5 times daily.  You cannot use too much of the rosalba med saline, but limit budesonide rinses to twice daily.    At 2 weeks after surgery, you may also restart nasal steroids (flonase, nasonex, etc).        Complications - Problems related to sinus surgery almost always are detected during the operation, and special instruction will be  given in that situation.  However, unexpected things can happen, and are all related to the structures around the sinus cavities.  Symptoms that should alert you to a possible problem include: severe eye pain or eye swelling, persistent heavy bleeding from the nose, and high fevers with headache and neck pain.  Any of these symptoms should be called into my office or to the on call ENT if after hours.  The most common non-emergency complication of sinus surgery is the formation of scar tissue which can re-block the sinuses.  This is addressed below.    Follow-up -  As you have noted, there are quite a few follow-up visits after sinus surgery.  This is done to aggressively manage the most common complication of this technique, which is scar tissue blocking the sinuses.  These visits will require the examination of your nose and possibly removal of crusts of dry mucous and blood, with possible removal of early scar tissue.  Please prepare for these visits by using your sinus rinses.    If there are any questions or issues with the above, or if there are other issues that concern you, always feel free to call the clinic and I am happy to speak with you as soon as I can.    Yulisa Quiroz D.O.  Otolaryngology/Head and Neck Surgery  Allergy    [phone] office    Follow up audiogram ETD   Valsalva exercises 3-4 times a day             Follow-ups after your visit        Additional Services     AUDIOLOGY ADULT REFERRAL       Your provider has referred you to: [facility] [phone]   [email or web address]    Specialty Testing:  Audiogram w/Tymps and Reflexes (Comprehensive Audiology Evaluation)                  Your next 10 appointments already scheduled     Sep 27, 2017  8:50 AM CDT   Return Visit with MENDOZA Diallo ([place])    [address]  [facility] [address]   [phone]        "       Future tests that were ordered for you today     Open Future Orders        Priority Expected Expires Ordered    AUDIOLOGY ADULT REFERRAL Routine  9/26/2018 9/26/2017            Who to contact     If you have questions or need follow up information about today's clinic visit or your schedule please contact [facility] directly at [phone].  Normal or non-critical lab and imaging results will be communicated to you by MyChart, letter or phone within 4 business days after the clinic has received the results. If you do not hear from us within 7 days, please contact the clinic through [facility] [employer] or phone. If you have a critical or abnormal lab result, we will notify you by phone as soon as possible.  Submit refill requests through [employer] or call your pharmacy and they will forward the refill request to us. Please allow 3 business days for your refill to be completed.          Additional Information About Your Visit        MyChart Information     [employer] gives you secure access to your electronic health record. If you see a primary care provider, you can also send messages to your care team and make appointments. If you have questions, please call your primary care clinic.  If you do not have a primary care provider, please call [phone] and they will assist you.        Care EveryWhere ID     This is your Care EveryWhere ID. This could be used by other organizations to access your [facility] medical records  [id number]        Your Vitals Were     Pulse Temperature Respirations Height Pulse Oximetry BMI (Body Mass Index)    84 99.1  F (37.3  C) (Tympanic) 16 5' 6\" (1.676 m) 99% 27.44 kg/m2       Blood Pressure from Last 3 Encounters:   09/26/17 100/64   09/03/17 107/72   08/27/17 105/70    Weight from Last 3 Encounters:   09/26/17 170 lb (77.1 kg)   07/10/17 174 lb (78.9 kg)   04/28/17 179 lb (81.2 kg)                 Today's Medication Changes          These changes are accurate as of: 9/26/17  " 2:48 PM.  If you have any questions, ask your nurse or doctor.               These medicines have changed or have updated prescriptions.        Dose/Directions    EPINEPHrine 0.3 MG/0.3ML injection   Commonly known as:  EPIPEN   This may have changed:  Another medication with the same name was removed. Continue taking this medication, and follow the directions you see here.   Used for:  Chronic rhinitis   Changed by:  Martina Dominguez PA-C        Dose:  0.3 mg   Inject 0.3 mLs into the muscle once as needed for anaphylaxis for 1 dose.   Quantity:  2 each   Refills:  3                Primary Care Provider Office Phone # Fax #    Go Villarreal [phone]       [facility] [address]  [facility] [address]        Equal Access to Services     HERMINIO JI : Nash Cordova, waaxda luqadaha, qaybta kaalmada adeegyalatosha, susanna maier . So [facility] [phone].    ATENCIÓN: Si habla español, tiene a suero disposición servicios gratuitos de asistencia lingüística. [facility] [phone].    We comply with applicable federal civil rights laws and [place] laws. We do not discriminate on the basis of race, color, national origin, age, disability sex, sexual orientation or gender identity.            Thank you!     Thank you for choosing [facility]  for your care. Our goal is always to provide you with excellent care. Hearing back from our patients is one way we can continue to improve our services. Please take a few minutes to complete the written survey that you may receive in the mail after your visit with us. Thank you!             Your Updated Medication List - Protect others around you: Learn how to safely use, store and throw away your medicines at www.disposemymeds.org.          This list is accurate as of: 9/26/17  2:48 PM.  Always use your most recent med list.                   Brand Name Dispense Instructions for use Diagnosis    albuterol 108  (90 BASE) MCG/ACT Inhaler    PROAIR HFA    1 Inhaler    Inhale 1 puff into the lungs 3 times daily For 3-5 days.        ALLERGEN IMMUNOTHERAPY PRESCRIPTION     5 mL    Proceed with SCIT per standard buildup protocol.    Perennial allergic rhinitis       azelastine-fluticasone 137-50 MCG/ACT nasal spray    DYMISTA    2 Bottle    Spray 1 spray into both nostrils 2 times daily    Perennial allergic rhinitis with seasonal variation, Seasonal allergic rhinitis, unspecified allergic rhinitis trigger       dexlansoprazole 60 MG Cpdr CR capsule    DEXILANT     Take 60 mg by mouth daily        EPINEPHrine 0.3 MG/0.3ML injection    EPIPEN    2 each    Inject 0.3 mLs into the muscle once as needed for anaphylaxis for 1 dose.    Chronic rhinitis       fluticasone 50 MCG/ACT spray    FLONASE    16 g    Spray 2 sprays into both nostrils daily    Chronic rhinitis       loratadine 10 MG tablet    CLARITIN    30 tablet    TAKE 1 TABLET DAILY.    Allergic rhinitis due to pollen, Seasonal allergic rhinitis, Perennial allergic rhinitis with seasonal variation       montelukast 10 MG tablet    SINGULAIR    30 tablet    Take 1 tablet (10 mg) by mouth At Bedtime    Perennial allergic rhinitis with seasonal variation, Seasonal allergic rhinitis, Allergic rhinitis due to pollen       multivitamin, therapeutic with minerals Tabs tablet      Take 1 tablet by mouth daily        vitamin D 10816 UNIT capsule    ERGOCALCIFEROL     Take 2,000 Units by mouth daily

## 2017-09-26 NOTE — PATIENT INSTRUCTIONS
Tonsils- likely tonsil stones. Use Salt water gargles as needed  Schedule sinus surgery for November.     Continue with allergy shots.   Continue with Claritin, Singulair (stop in 1 month)   Ears look well.   Thank you for allowing Dr. Quiroz and our ENT team to participate in your care.  If you have a scheduling or an appointment question please contact [facility] Unit Coordinator at their direct line [phone].   ALL nursing questions or concerns can be directed to your ENT nurse at: [phone] - Patrizia    Instructions for Sinus Surgery    Recovery - Everyone recovers differently from a general anesthetic.  Symptoms such as fatigue, nausea, light-headedness, and sometimes a low grade fever (up to 100 degrees) are not unusual.  As your body removes the anesthetic drugs from circulation, these symptoms will resolve.  Your nose will be sore after surgery, and you may even have symptoms similar to a sinus infection with headache, congestion, and pressure.  These will resolve with healing.  For several days you may experience bloody drainage from the nose, please use the drip pad as necessary for this.  If there is persistent bleeding, please call the office during business hours or the on call ENT physician after hours.  There are no diet restrictions after sinus surgery, and you can resume your home medications.      Please do not blow your nose until two weeks after surgery.  At 2 weeks you may gently blow your nose, unless otherwise indicated by Dr. Quiroz.     Limit your activity to no strenuous activities until I see you for the first follow-up visit in approximately 2 weeks.      Medications - You were sent home with narcotic pain medication.  If you can tolerate the discomfort during your recovery by using just plain Tylenol or ibuprofen (advil), please do so.  However, do not hesitate to use the stronger pain medication if needed.  If you were sent home with an antibiotic, it is primarily  used to help the healing process.  If it causes loose bowel movements or other signs of intolerance, it is appropriate to discontinue it.  By far the most important measure you can take to speed recovery, and maximize the chances of long term success of sinus surgery is using the sinus rinses at least three time per day for the first month after surgery.       Start Rosalba Med saline irrigation tonight and use at least 5 times daily.     1.  You will need to purchase 2 rosalba med bottles.  Make each bottle the same way, using warm distilled water filled up to the 240 ml lana, dissolve 2 packets of salt and mix into each bottle    2.  In one bottle, add the budesonide (steroid), shake gently to dissolve, and irrigate each nostril using the entire bottle divided between nostrils.  Each day you will use two entire bottle of the budesonide solution and will remake this the next day.     3.  In the other rosalba med bottle use only the saline solution, and irrigate with this at least 3 additional times daily.    Perform gentle irrigation for the first week.  Starting 1 week after surgery, you should increase the volume of rosalba med saline irrigation to each nostril, continuing to use the rinses in an alternating fashion at least 5 times daily.  You cannot use too much of the rosalba med saline, but limit budesonide rinses to twice daily.    At 2 weeks after surgery, you may also restart nasal steroids (flonase, nasonex, etc).        Complications - Problems related to sinus surgery almost always are detected during the operation, and special instruction will be given in that situation.  However, unexpected things can happen, and are all related to the structures around the sinus cavities.  Symptoms that should alert you to a possible problem include: severe eye pain or eye swelling, persistent heavy bleeding from the nose, and high fevers with headache and neck pain.  Any of these symptoms should be called into my office or to the  on call ENT if after hours.  The most common non-emergency complication of sinus surgery is the formation of scar tissue which can re-block the sinuses.  This is addressed below.    Follow-up -  As you have noted, there are quite a few follow-up visits after sinus surgery.  This is done to aggressively manage the most common complication of this technique, which is scar tissue blocking the sinuses.  These visits will require the examination of your nose and possibly removal of crusts of dry mucous and blood, with possible removal of early scar tissue.  Please prepare for these visits by using your sinus rinses.    If there are any questions or issues with the above, or if there are other issues that concern you, always feel free to call the clinic and I am happy to speak with you as soon as I can.    Yulisa Quiroz D.O.  Otolaryngology/Head and Neck Surgery  Allergy    [phone] office    Follow up audiogram ETD   Valsalva exercises 3-4 times a day

## 2017-09-26 NOTE — PROGRESS NOTES
Chief Complaint   Patient presents with     RECHECK     Follow up throat/sinus and ears before surgery. Has been feeling dizzy iwth some ear fullness.      Sydni presents for concerns of ears/ throat. She is considering reviewing sinus surgery with Dr. Quiroz in November.   Sydni has aural fullness, plugged feeling in her ears. Ears feel crackling worse in AM and improved as time goes on. Hx of COM as a child, and hx of BTT at 5 years ago.   Increase in symptoms with flight, pressure changes.   Denies otalgia, otorrhea. Hearing feels stable. No recent audiogram.     She has lightheadedness while she is walking. No greg provoking triggers.  No fluctuating hearing loss.  Denies tinnitus. No falls. She has not been drinking much water, possible dehydration.   She has been using Claritin, Singulair. Does not feel relation to Meds.    She denies vertigo. Describes off balance/ dizziness.     Daily congestion worse on right, maxillary and frontal pressure, exacerbates with season change and with upper respiratory illness   Overall she feels worse than she did when seen this summer despite medical mgmt     Hx of distant sinus surgeyr and septoplasty with over 15 years ago with Dr. Ford, recalls painful packing removal post op    Daily maxillary and frontal pressure, exacerbates with season change and with upper respiratory illness         Hx of perennial allergic rhinitis with seasonal exacerbation   last note reviewed: using rosalba med, dymista, claritin and singulari  On subcutaneous immunotherapy      8/5/16  MQT-  Dilution #6: Dust  Dilution #5: Grass, Cat, dog  Dilution #2: Trees, molds, molds    I reviewed my last note dated 7/10      History reviewed. No pertinent past medical history.   No Known Allergies  Current Outpatient Prescriptions   Medication     loratadine (CLARITIN) 10 MG tablet     ORDER FOR ALLERGEN IMMUNOTHERAPY     dexlansoprazole (DEXILANT) 60 MG CPDR CR capsule     azelastine-fluticasone  "(DYMISTA) 137-50 MCG/ACT nasal spray     montelukast (SINGULAIR) 10 MG tablet     fluticasone (FLONASE) 50 MCG/ACT nasal spray     multivitamin, therapeutic with minerals (THERA-VIT-M) TABS     albuterol (ALBUTEROL) 108 (90 BASE) MCG/ACT inhaler     vitamin D (ERGOCALCIFEROL) 44052 UNIT capsule     EPINEPHrine (EPIPEN) 0.3 MG/0.3ML injection     No current facility-administered medications for this visit.       ROS: 10 point ROS neg other than the symptoms noted above in the HPI.  /64  Pulse 84  Temp 99.1  F (37.3  C) (Tympanic)  Resp 16  Ht 5' 6\" (1.676 m)  Wt 170 lb (77.1 kg)  SpO2 99%  BMI 27.44 kg/m2  General - The patient is well nourished and well developed, and appears to have good nutritional status.  Alert and oriented to person and place, interactive.  Head and Face - Normocephalic and atraumatic, with no gross asymmetry noted of the contour of the facial features.  The facial nerve is intact, with strong symmetric movements.  Neck-no palpable lymphadenopathy or thyroid mass.  Trachea is midline.  Eyes - Extraocular movements intact.   Ears- External auditory canals are patent, tympanic membranes are intact without effusion or worrisome retractions   Nose - Nasal mucosa is pink and moist with no abnormal mucus.  The septum was deviated right 70% obstruction,   inferior turbinate hypertrophy  No polyps, masses, or purulence noted on examination.  ENV collapse left with inhalation, photos taken and pionted out to her.  Good caudal cartilage support with palpation.   Mouth - Examination of the oral cavity shows pink, healthy, moist mucosa.  No lesions or ulceration noted.  The dentition are in good repair.  The tongue is mobile and midline.  Throat - The walls of the oropharynx were smooth, pink, moist, symmetric, and had no lesions or ulcerations.  The tonsillar pillars and soft palate were symmetric.  The uvula was midline on elevation.    NEURO:  equal  strength, neg Romberg, DTR II/IV " bilaterally (UE and LE), finger to nose normal, CN intact, ambulates without difficulty.  no focal deficits noted.  Negative Shiela Hallpike.       CT sinuses reviewed dated 4/28/17:  Minimal mucoperiosteal thickening bilateral max,  DNS skewed right ,  inferior turbinate hypertrophy   keros 2    Impression and Plan      ICD-10-CM    1. ETD (eustachian tube dysfunction) H69.80 AUDIOLOGY ADULT REFERRAL   2. Chronic pansinusitis J32.4    3. DNS (deviated nasal septum) J34.2    4. Nasal turbinate hypertrophy J34.3    5. Facial pain R51    6. Nasal valve collapse J34.89    7. Perennial allergic rhinitis with seasonal variation J30.89     J30.2        The risks and complications of Endoscopic Sinus Surgery (ESS), septoplasty, turbinate reduction, repair left nasal valve   were openly discussed.  The potential risks include bleeding, general anesthesia, infection, scar formation, need for additional surgery, septal perforation, and rarely injury to the eye with the possibility of blindness,  injury to the brain, meningitis or other intracranial complications.  Nonsurgical options were discussed including prolonged antibioitics and/or oral and topical steroids.   Sinus anatomy and sinus disease were reviewed.  CT sinus was reviewed with the patient.  All questions were answered.   Plan total without sphenoids  Rhinoplasty photos   Continue nasal steroids, claritin  No guarantees facial pain will resolve with surgery and she voices understanding  Can stop singulair, not necessary and she has not noted any improvement in use of the LTI  Continue subcutaneous immunotherapy, allergen avoidance  valsava tid  Total exam time was over 40 minutes with over 25 minutes of this time spent in direct patient education, counseling and coordination of care.  We discussed the importance of high flow nasal saline use to prevent nasal secretion stasis, the correct use of nasal steroids, and nasal and sinus anatomy were reviewed.

## 2017-09-26 NOTE — NURSING NOTE
"Chief Complaint   Patient presents with     RECHECK     Follow up throat/sinus and ears before surgery. Has been feeling dizzy iwth some ear fullness.        Initial /64  Pulse 84  Temp 99.1  F (37.3  C) (Tympanic)  Resp 16  Ht 5' 6\" (1.676 m)  Wt 170 lb (77.1 kg)  SpO2 99%  BMI 27.44 kg/m2 Estimated body mass index is 27.44 kg/(m^2) as calculated from the following:    Height as of this encounter: 5' 6\" (1.676 m).    Weight as of this encounter: 170 lb (77.1 kg).  Medication Reconciliation: complete   Ritu Black      "

## 2017-09-27 ENCOUNTER — OFFICE VISIT (OUTPATIENT)
Dept: CHIROPRACTIC MEDICINE | Facility: OTHER | Age: 37
End: 2017-09-27
Attending: CHIROPRACTOR
Payer: COMMERCIAL

## 2017-09-27 DIAGNOSIS — M54.50 ACUTE BILATERAL LOW BACK PAIN WITHOUT SCIATICA: ICD-10-CM

## 2017-09-27 DIAGNOSIS — M99.01 SEGMENTAL AND SOMATIC DYSFUNCTION OF CERVICAL REGION: ICD-10-CM

## 2017-09-27 DIAGNOSIS — M99.03 SEGMENTAL AND SOMATIC DYSFUNCTION OF LUMBAR REGION: Primary | ICD-10-CM

## 2017-09-27 DIAGNOSIS — M99.02 SEGMENTAL AND SOMATIC DYSFUNCTION OF THORACIC REGION: ICD-10-CM

## 2017-09-27 PROCEDURE — 98941 CHIROPRACT MANJ 3-4 REGIONS: CPT | Mod: AT | Performed by: CHIROPRACTOR

## 2017-09-27 NOTE — MR AVS SNAPSHOT
After Visit Summary   9/27/2017    Sydni Isabel    MRN: [id number]           Patient Information     Date Of Birth          1980        Visit Information        Provider Department      9/27/2017 8:50 AM Emerson Carmen DC  [facility]        Today's Diagnoses     Segmental and somatic dysfunction of lumbar region    -  1    Acute bilateral low back pain without sciatica        Segmental and somatic dysfunction of thoracic region        Segmental and somatic dysfunction of cervical region           Follow-ups after your visit        Your next 10 appointments already scheduled     Sep 29, 2017  2:30 PM CDT   injection with HC SHOT ROOM   [facility] ([facility] [place] )    [address]  [place] [address]   [phone]            Oct 02, 2017  8:15 AM CDT   (Arrive by 8:00 AM)   Hearing Eval with Abdirizak Zelaya   [facility] ([facility] )    [address]  [place] [address]   [phone]              Who to contact     If you have questions or need follow up information about today's clinic visit or your schedule please contact  [facility] directly at [phone].  Normal or non-critical lab and imaging results will be communicated to you by MyChart, letter or phone within 4 business days after the clinic has received the results. If you do not hear from us within 7 days, please contact the clinic through MyChart or phone. If you have a critical or abnormal lab result, we will notify you by phone as soon as possible.  Submit refill requests through JethroData or call your pharmacy and they will forward the refill request to us. Please allow 3 business days for your refill to be completed.          Additional Information About Your Visit        MyChart Information     [employer] gives you secure access to your electronic health record. If you see a primary care provider, you can also send messages to  your care team and make appointments. If you have questions, please call your primary care clinic.  If you do not have a primary care provider, please call [phone] and they will assist you.        Care EveryWhere ID     This is your Care EveryWhere ID. This could be used by other organizations to access your [place] medical records  [id number]         Blood Pressure from Last 3 Encounters:   09/26/17 100/64   09/03/17 107/72   08/27/17 105/70    Weight from Last 3 Encounters:   09/26/17 170 lb (77.1 kg)   07/10/17 174 lb (78.9 kg)   04/28/17 179 lb (81.2 kg)              We Performed the Following     CHIROPRAC MANIP,SPINAL,3-4 REGIONS        Primary Care Provider Office Phone # Fax #    Go Villarreal [phone]       [facility] [address]  [facility] [address]        Equal Access to Services     HERMINIO JI : Hadii aad ku hadasho Soomaali, waaxda luqadaha, qaybta kaalmada adeegyada, waxay aneudy maier . So [facility] [phone].    ATENCIÓN: Si chuckla español, tiene a suero disposición servicios gratuitos de asistencia lingüística. Llame al [phone].    We comply with applicable federal civil rights laws and [place] laws. We do not discriminate on the basis of race, color, national origin, age, disability sex, sexual orientation or gender identity.            Thank you!     Thank you for choosing  [facility]  for your care. Our goal is always to provide you with excellent care. Hearing back from our patients is one way we can continue to improve our services. Please take a few minutes to complete the written survey that you may receive in the mail after your visit with us. Thank you!             Your Updated Medication List - Protect others around you: Learn how to safely use, store and throw away your medicines at www.disposemymeds.org.          This list is accurate as of: 9/27/17 11:59 PM.  Always use your most recent med list.                    Brand Name Dispense Instructions for use Diagnosis    albuterol 108 (90 BASE) MCG/ACT Inhaler    PROAIR HFA    1 Inhaler    Inhale 1 puff into the lungs 3 times daily For 3-5 days.        ALLERGEN IMMUNOTHERAPY PRESCRIPTION     5 mL    Proceed with SCIT per standard buildup protocol.    Perennial allergic rhinitis       azelastine-fluticasone 137-50 MCG/ACT nasal spray    DYMISTA    2 Bottle    Spray 1 spray into both nostrils 2 times daily    Perennial allergic rhinitis with seasonal variation, Seasonal allergic rhinitis, unspecified allergic rhinitis trigger       dexlansoprazole 60 MG Cpdr CR capsule    DEXILANT     Take 60 mg by mouth daily        EPINEPHrine 0.3 MG/0.3ML injection    EPIPEN    2 each    Inject 0.3 mLs into the muscle once as needed for anaphylaxis for 1 dose.    Chronic rhinitis       fluticasone 50 MCG/ACT spray    FLONASE    16 g    Spray 2 sprays into both nostrils daily    Chronic rhinitis       loratadine 10 MG tablet    CLARITIN    30 tablet    TAKE 1 TABLET DAILY.    Allergic rhinitis due to pollen, Seasonal allergic rhinitis, Perennial allergic rhinitis with seasonal variation       multivitamin, therapeutic with minerals Tabs tablet      Take 1 tablet by mouth daily        vitamin D 77425 UNIT capsule    ERGOCALCIFEROL     Take 2,000 Units by mouth daily

## 2017-09-28 NOTE — PROGRESS NOTES
Subjective Finding:    Chief compalint: Patient presents with:  Back Pain  , Pain Scale: 3/10, Intensity: sharp, Duration: 2 months, Radiating: no.    Date of injury:     Activities that the pain restricts:   Home/household/hobbies/social activities: no.  Work duties: no.  Sleep: no.  Makes symptoms better: rest.  Makes symptoms worse: activity, cervical extension and cervical flexion.  Have you seen anyone else for the symptoms? MD.  Work related: no.  Automobile related injury: no.    Objective and Assessment:    Posture Analysis:   High shoulder: .  Head tilt: .  High iliac crest: .  Head carriage: forward.  Thoracic Kyphosis: neutral.  Lumbar Lordosis: neutral.    Lumbar Range of Motion: .  Cervical Range of Motion: extension decreased, left lateral flexion decreased and right lateral flexion decreased.  Thoracic Range of Motion: left lateral flexion decreased.  Extremity Range of Motion: .    Palpation:   Lev scapulae: stiff, referred pain: no  Traps: sharp pain, referred pain: no    Segmental dysfunction pre-treatment and treatment area: C2, C5, C7, T4 and L5.    Assessment post-treatment:  Cervical: ROM increased.  Thoracic: ROM increased.  Lumbar: ROM increased.    Comments: .      Complicating Factors: .    Plan / Procedure:    Treatment plan: 2 times per week for 2 weeks.  Instructed patient: stretch as instructed at visit.  Short term goals: increase ROM.  Long term goals: restore normal function.  Prognosis: very good.

## 2017-09-29 ENCOUNTER — ALLIED HEALTH/NURSE VISIT (OUTPATIENT)
Dept: ALLERGY | Facility: OTHER | Age: 37
End: 2017-09-29
Attending: PHYSICIAN ASSISTANT
Payer: COMMERCIAL

## 2017-09-29 DIAGNOSIS — J30.9 ALLERGIC RHINITIS, UNSPECIFIED: Primary | ICD-10-CM

## 2017-09-29 PROCEDURE — 95115 IMMUNOTHERAPY ONE INJECTION: CPT

## 2017-09-29 NOTE — PROGRESS NOTES
Prior to injection verified patient identity using patient's name and date of birth.  Allergy injection/s given and charted on paper allergy flow sheet.  Pt signed out HARDEEP Colón

## 2017-10-04 ENCOUNTER — OFFICE VISIT (OUTPATIENT)
Dept: CHIROPRACTIC MEDICINE | Facility: OTHER | Age: 37
End: 2017-10-04
Attending: CHIROPRACTOR
Payer: COMMERCIAL

## 2017-10-04 DIAGNOSIS — M54.50 ACUTE BILATERAL LOW BACK PAIN WITHOUT SCIATICA: ICD-10-CM

## 2017-10-04 DIAGNOSIS — M99.02 SEGMENTAL AND SOMATIC DYSFUNCTION OF THORACIC REGION: Primary | ICD-10-CM

## 2017-10-04 DIAGNOSIS — M99.03 SEGMENTAL AND SOMATIC DYSFUNCTION OF LUMBAR REGION: ICD-10-CM

## 2017-10-04 PROCEDURE — 98940 CHIROPRACT MANJ 1-2 REGIONS: CPT | Mod: AT | Performed by: CHIROPRACTOR

## 2017-10-04 NOTE — MR AVS SNAPSHOT
After Visit Summary   10/4/2017    Sydni Isabel    MRN: [id number]           Patient Information     Date Of Birth          1980        Visit Information        Provider Department      10/4/2017 8:50 AM Emerson Carmen DC [facility]        Today's Diagnoses     Segmental and somatic dysfunction of thoracic region    -  1    Acute bilateral low back pain without sciatica        Segmental and somatic dysfunction of lumbar region           Follow-ups after your visit        Your next 10 appointments already scheduled     Oct 06, 2017  3:00 PM CDT   injection with HC SHOT ROOM   [facility] ([facility] )    [address]  [facility] [address]   [phone]              Who to contact     If you have questions or need follow up information about today's clinic visit or your schedule please contact  [facility] NATACHA BRANTLEY directly at [phone].  Normal or non-critical lab and imaging results will be communicated to you by [employer]hart, letter or phone within 4 business days after the clinic has received the results. If you do not hear from us within 7 days, please contact the clinic through [employer] or phone. If you have a critical or abnormal lab result, we will notify you by phone as soon as possible.  Submit refill requests through TapHome or call your pharmacy and they will forward the refill request to us. Please allow 3 business days for your refill to be completed.          Additional Information About Your Visit        MyChart Information     TapHome gives you secure access to your electronic health record. If you see a primary care provider, you can also send messages to your care team and make appointments. If you have questions, please call your primary care clinic.  If you do not have a primary care provider, please call [phone] and they will assist you.        Care EveryWhere ID     This is your Care EveryWhere ID. This could be used by  other organizations to access your [place] medical records  [id number]         Blood Pressure from Last 3 Encounters:   09/26/17 100/64   09/03/17 107/72   08/27/17 105/70    Weight from Last 3 Encounters:   09/26/17 170 lb (77.1 kg)   07/10/17 174 lb (78.9 kg)   04/28/17 179 lb (81.2 kg)              We Performed the Following     CHIROPRAC MANIP,SPINAL,1-2 REGIONS        Primary Care Provider Office Phone # Fax #    Go Villarreal [phone]       [facility] [address]  [facility] [address]        Equal Access to Services     JANESSA JI : Hadii alonzo Cordova, wakimda george, qaybta kaalmada gabe, susanna maier . So [facility] [phone].    ATENCIÓN: Si habla español, tiene a suero disposición servicios gratuitos de asistencia lingüística. Llame al [phone].    We comply with applicable federal civil rights laws and [place] laws. We do not discriminate on the basis of race, color, national origin, age, disability, sex, sexual orientation, or gender identity.            Thank you!     Thank you for choosing  [facility]  for your care. Our goal is always to provide you with excellent care. Hearing back from our patients is one way we can continue to improve our services. Please take a few minutes to complete the written survey that you may receive in the mail after your visit with us. Thank you!             Your Updated Medication List - Protect others around you: Learn how to safely use, store and throw away your medicines at www.disposemymeds.org.          This list is accurate as of: 10/4/17 11:59 PM.  Always use your most recent med list.                   Brand Name Dispense Instructions for use Diagnosis    albuterol 108 (90 BASE) MCG/ACT Inhaler    PROAIR HFA    1 Inhaler    Inhale 1 puff into the lungs 3 times daily For 3-5 days.        ALLERGEN IMMUNOTHERAPY PRESCRIPTION     5 mL    Proceed with SCIT per standard  buildup protocol.    Perennial allergic rhinitis       azelastine-fluticasone 137-50 MCG/ACT nasal spray    DYMISTA    2 Bottle    Spray 1 spray into both nostrils 2 times daily    Perennial allergic rhinitis with seasonal variation, Seasonal allergic rhinitis, unspecified allergic rhinitis trigger       dexlansoprazole 60 MG Cpdr CR capsule    DEXILANT     Take 60 mg by mouth daily        EPINEPHrine 0.3 MG/0.3ML injection    EPIPEN    2 each    Inject 0.3 mLs into the muscle once as needed for anaphylaxis for 1 dose.    Chronic rhinitis       fluticasone 50 MCG/ACT spray    FLONASE    16 g    Spray 2 sprays into both nostrils daily    Chronic rhinitis       loratadine 10 MG tablet    CLARITIN    30 tablet    TAKE 1 TABLET DAILY.    Allergic rhinitis due to pollen, Seasonal allergic rhinitis, Perennial allergic rhinitis with seasonal variation       multivitamin, therapeutic with minerals Tabs tablet      Take 1 tablet by mouth daily        vitamin D 18506 UNIT capsule    ERGOCALCIFEROL     Take 2,000 Units by mouth daily

## 2017-10-05 NOTE — PROGRESS NOTES
Subjective Finding:    Chief compalint: Patient presents with:  Back Pain: mid and low back pain.  as well as rib pain  , Pain Scale: 5/10, Intensity: sharp, Duration: 1 months, Radiating: no.    Date of injury:     Activities that the pain restricts:   Home/household/hobbies/social activities: no.  Work duties: no.  Sleep: no.  Makes symptoms better: rest.  Makes symptoms worse: activity, cervical extension and cervical flexion.  Have you seen anyone else for the symptoms? MD.  Work related: no.  Automobile related injury: no.    Objective and Assessment:    Posture Analysis:   High shoulder: .  Head tilt: .  High iliac crest: .  Head carriage: forward.  Thoracic Kyphosis: neutral.  Lumbar Lordosis: neutral.    Lumbar Range of Motion: .  Cervical Range of Motion: .  Thoracic Range of Motion: left lateral flexion decreased.  Extremity Range of Motion: .    Palpation:   Lev scapulae: stiff, referred pain: no  Traps: sharp pain, referred pain: no    Segmental dysfunction pre-treatment and treatment area: T567  L45.    Assessment post-treatment:  Cervical: ROM increased.  Thoracic: ROM increased.  Lumbar: ROM increased.    Comments: .      Complicating Factors: .    Plan / Procedure:    Treatment plan: prn.  Instructed patient: stretch as instructed at visit.  Short term goals: increase ROM.  Long term goals: restore normal function.  Prognosis: very good.

## 2017-10-06 ENCOUNTER — APPOINTMENT (OUTPATIENT)
Dept: CT IMAGING | Facility: HOSPITAL | Age: 37
End: 2017-10-06
Attending: PHYSICIAN ASSISTANT
Payer: COMMERCIAL

## 2017-10-06 ENCOUNTER — HOSPITAL ENCOUNTER (EMERGENCY)
Facility: HOSPITAL | Age: 37
Discharge: HOME | End: 2017-10-06
Attending: PHYSICIAN ASSISTANT | Admitting: PHYSICIAN ASSISTANT
Payer: COMMERCIAL

## 2017-10-06 VITALS
DIASTOLIC BLOOD PRESSURE: 73 MMHG | RESPIRATION RATE: 13 BRPM | HEART RATE: 82 BPM | SYSTOLIC BLOOD PRESSURE: 127 MMHG | TEMPERATURE: 98.6 F | OXYGEN SATURATION: 99 %

## 2017-10-06 DIAGNOSIS — R06.02 SHORTNESS OF BREATH: ICD-10-CM

## 2017-10-06 DIAGNOSIS — R07.89 ATYPICAL CHEST PAIN: ICD-10-CM

## 2017-10-06 DIAGNOSIS — R42 POSTURAL DIZZINESS: ICD-10-CM

## 2017-10-06 LAB
ALBUMIN SERPL-MCNC: 4 G/DL (ref 3.4–5)
ALP SERPL-CCNC: 58 U/L (ref 40–150)
ALT SERPL W P-5'-P-CCNC: 18 U/L (ref 0–50)
ANION GAP SERPL CALCULATED.3IONS-SCNC: 11 MMOL/L (ref 3–14)
AST SERPL W P-5'-P-CCNC: 15 U/L (ref 0–45)
BASE DEFICIT BLDA-SCNC: 5.7 MMOL/L
BASOPHILS # BLD AUTO: 0 10E9/L (ref 0–0.2)
BASOPHILS NFR BLD AUTO: 0.6 %
BILIRUB SERPL-MCNC: 0.9 MG/DL (ref 0.2–1.3)
BUN SERPL-MCNC: 10 MG/DL (ref 7–30)
CALCIUM SERPL-MCNC: 8.9 MG/DL (ref 8.5–10.1)
CHLORIDE SERPL-SCNC: 105 MMOL/L (ref 94–109)
CO2 SERPL-SCNC: 23 MMOL/L (ref 20–32)
CREAT SERPL-MCNC: 0.59 MG/DL (ref 0.52–1.04)
D DIMER PPP DDU-MCNC: <200 NG/ML D-DU (ref 0–300)
DIFFERENTIAL METHOD BLD: NORMAL
EOSINOPHIL # BLD AUTO: 0.1 10E9/L (ref 0–0.7)
EOSINOPHIL NFR BLD AUTO: 1.4 %
ERYTHROCYTE [DISTWIDTH] IN BLOOD BY AUTOMATED COUNT: 11.8 % (ref 10–15)
GFR SERPL CREATININE-BSD FRML MDRD: >90 ML/MIN/1.7M2
GLUCOSE SERPL-MCNC: 76 MG/DL (ref 70–99)
HCO3 BLD-SCNC: 18 MMOL/L (ref 21–28)
HCT VFR BLD AUTO: 41.4 % (ref 35–47)
HGB BLD-MCNC: 14.2 G/DL (ref 11.7–15.7)
IMM GRANULOCYTES # BLD: 0 10E9/L (ref 0–0.4)
IMM GRANULOCYTES NFR BLD: 0.3 %
LYMPHOCYTES # BLD AUTO: 1.7 10E9/L (ref 0.8–5.3)
LYMPHOCYTES NFR BLD AUTO: 26.1 %
MCH RBC QN AUTO: 29.2 PG (ref 26.5–33)
MCHC RBC AUTO-ENTMCNC: 34.3 G/DL (ref 31.5–36.5)
MCV RBC AUTO: 85 FL (ref 78–100)
MONOCYTES # BLD AUTO: 0.4 10E9/L (ref 0–1.3)
MONOCYTES NFR BLD AUTO: 5.3 %
NEUTROPHILS # BLD AUTO: 4.4 10E9/L (ref 1.6–8.3)
NEUTROPHILS NFR BLD AUTO: 66.3 %
NRBC # BLD AUTO: 0 10*3/UL
NRBC BLD AUTO-RTO: 0 /100
O2/TOTAL GAS SETTING VFR VENT: ABNORMAL %
OXYHGB MFR BLD: 98 % (ref 92–100)
PCO2 BLD: 32 MM HG (ref 35–45)
PH BLD: 7.37 PH (ref 7.35–7.45)
PLATELET # BLD AUTO: 264 10E9/L (ref 150–450)
PO2 BLD: 103 MM HG (ref 80–105)
POTASSIUM SERPL-SCNC: 3.6 MMOL/L (ref 3.4–5.3)
PROT SERPL-MCNC: 8.3 G/DL (ref 6.8–8.8)
RBC # BLD AUTO: 4.86 10E12/L (ref 3.8–5.2)
SODIUM SERPL-SCNC: 139 MMOL/L (ref 133–144)
TROPONIN I SERPL-MCNC: <0.015 UG/L (ref 0–0.04)
WBC # BLD AUTO: 6.6 10E9/L (ref 4–11)

## 2017-10-06 PROCEDURE — 25000128 H RX IP 250 OP 636: Performed by: PHYSICIAN ASSISTANT

## 2017-10-06 PROCEDURE — 36415 COLL VENOUS BLD VENIPUNCTURE: CPT | Performed by: PHYSICIAN ASSISTANT

## 2017-10-06 PROCEDURE — 93005 ELECTROCARDIOGRAM TRACING: CPT

## 2017-10-06 PROCEDURE — 96360 HYDRATION IV INFUSION INIT: CPT | Mod: 59

## 2017-10-06 PROCEDURE — 85025 COMPLETE CBC W/AUTO DIFF WBC: CPT | Performed by: PHYSICIAN ASSISTANT

## 2017-10-06 PROCEDURE — 85379 FIBRIN DEGRADATION QUANT: CPT | Performed by: PHYSICIAN ASSISTANT

## 2017-10-06 PROCEDURE — 84484 ASSAY OF TROPONIN QUANT: CPT | Performed by: PHYSICIAN ASSISTANT

## 2017-10-06 PROCEDURE — 82805 BLOOD GASES W/O2 SATURATION: CPT | Performed by: PHYSICIAN ASSISTANT

## 2017-10-06 PROCEDURE — 99285 EMERGENCY DEPT VISIT HI MDM: CPT | Mod: 25

## 2017-10-06 PROCEDURE — 36600 WITHDRAWAL OF ARTERIAL BLOOD: CPT

## 2017-10-06 PROCEDURE — 80053 COMPREHEN METABOLIC PANEL: CPT | Performed by: PHYSICIAN ASSISTANT

## 2017-10-06 PROCEDURE — 71275 CT ANGIOGRAPHY CHEST: CPT | Mod: TC

## 2017-10-06 PROCEDURE — 25000128 H RX IP 250 OP 636: Performed by: RADIOLOGY

## 2017-10-06 PROCEDURE — 99285 EMERGENCY DEPT VISIT HI MDM: CPT | Performed by: PHYSICIAN ASSISTANT

## 2017-10-06 RX ORDER — SODIUM CHLORIDE, SODIUM LACTATE, POTASSIUM CHLORIDE, CALCIUM CHLORIDE 600; 310; 30; 20 MG/100ML; MG/100ML; MG/100ML; MG/100ML
1000 INJECTION, SOLUTION INTRAVENOUS CONTINUOUS
Status: DISCONTINUED | OUTPATIENT
Start: 2017-10-06 | End: 2017-10-06 | Stop reason: HOSPADM

## 2017-10-06 RX ORDER — IOPAMIDOL 755 MG/ML
75 INJECTION, SOLUTION INTRAVASCULAR ONCE
Status: COMPLETED | OUTPATIENT
Start: 2017-10-06 | End: 2017-10-06

## 2017-10-06 RX ADMIN — IOPAMIDOL 75 ML: 755 INJECTION, SOLUTION INTRAVENOUS at 12:39

## 2017-10-06 RX ADMIN — SODIUM CHLORIDE, POTASSIUM CHLORIDE, SODIUM LACTATE AND CALCIUM CHLORIDE 1000 ML: 600; 310; 30; 20 INJECTION, SOLUTION INTRAVENOUS at 12:12

## 2017-10-06 RX ADMIN — SODIUM CHLORIDE, POTASSIUM CHLORIDE, SODIUM LACTATE AND CALCIUM CHLORIDE 1000 ML: 600; 310; 30; 20 INJECTION, SOLUTION INTRAVENOUS at 12:57

## 2017-10-06 ASSESSMENT — ENCOUNTER SYMPTOMS
CHEST TIGHTNESS: 1
WHEEZING: 0
ACTIVITY CHANGE: 1
HEADACHES: 0
CHILLS: 1
COUGH: 0
DIZZINESS: 1
ABDOMINAL PAIN: 0
PHOTOPHOBIA: 0
NECK PAIN: 0
LIGHT-HEADEDNESS: 1
SHORTNESS OF BREATH: 1
VOMITING: 0
PALPITATIONS: 1
NAUSEA: 0
UNEXPECTED WEIGHT CHANGE: 1
FATIGUE: 1
WEAKNESS: 1
BACK PAIN: 0
FEVER: 0

## 2017-10-06 NOTE — ED AVS SNAPSHOT
[facility]    [address] [address]    Phone:  [phone]                                       Sydni Isabel   MRN: [id number]    Department:  [facility]   Date of Visit:  10/6/2017           Patient Information     Date Of Birth          1980        Your diagnoses for this visit were:     Shortness of breath     Atypical chest pain     Postural dizziness        You were seen by Alex Araya PA-C.      Follow-up Information     Follow up with Go Villarreal MD.    Specialty:  Family Practice    Contact information:    [facility]  [address] [facility] [address] [phone]          Follow up with [facility].    Specialty:  EMERGENCY MEDICINE    Why:  If symptoms worsen    Contact information:    [address] [address] [phone]    Additional information:    From [facility]: Take [address]. Turn left at [address]. Turn left at the first stoplight on [address] [place] [address]. At the first stop sign, take a right onto [address]. Take a left into the parking lot and continue through until you reach the North enterance of the building.       From [place]: Take [address]. Take the [address] ramp towards [place]. Turn left onto [address]. Take a slight right onto [address]. Turn left at the first stoplight on [address]. At the first stop sign, take a right onto [address]. Take a left into the parking lot and continue through until you reach the North enterance of the building.       From [place]: Take [address]. Take a right at [address]. At the first stop sign, take a right onto [address]. Take a left into the parking lot and continue through until you reach the North enterance of the building.         Discharge Instructions       What to expect when you have contrast    During your exam, we will inject  contrast   into your vein or artery. (Contrast is a clear liquid with iodine in it. It shows up on X-rays.)    You may feel warm or hot. You may have a metal taste in your mouth and a slight upset stomach. You may also feel pressure near the kidneys and bladder. These effects will last about 1 to 3 minutes.    Please tell us if you have:    Sneezing     Itching    Hives     Swelling in the face    A hoarse voice    Breathing problems    Other new symptoms    Serious problems are rare.  They may include:    Irregular heartbeat     Seizures    Kidney failure              Tissue damage    Shock      Death    If you have any problems during the exam, we  will treat them right away.    When you get home    Call your hospital if you have any new symptoms in the next 2 days, like hives or swelling. (Phone numbers are at the bottom of this page.) Or call your family doctor.     If you have wheezing or trouble breathing, call 911.    Self-care  -Drink at least 4 extra glasses of water today.   This reduces the stress on your kidneys.  -Keep taking your regular medicines.    The contrast will pass out of your body in your  Urine(pee). This will happen in the next 24 hours. You  will not feel this. Your urine will not  change color.    If you have kidney problems or take metformin    Drink 4 to 8 large glasses of water for the next  2 days, if you are not on a fluid restriction.    ?If you take metformin (Glucophage or Glucovance) for diabetes, keep taking it.      ?Your kidney function tests are abnormal.  If you take Metformin, do not take it for 48 hours. Please go to your clinic for a blood test within 3 days after your exam before the restarting this medicine.     (Note to provider:please give patient prescription for lab tests.)    ?Special instructions: Drink at least 4 extra glasses of water today.   This reduces the stress on your kidneys.    I have read and understand the above information.    Patient Sign  Here:______________________________________Date:________Time:______    Staff Sign Here:________________________________________Date:_______Time:______      Radiology Departments:     ?[facility]: [phone] ?[facility]: [phone]     ?[facility]: [phone] ?[facility]:[phone]      ?[facility]: [phone]  ?[facility]: [phone]  ?[facility]:[phone]    ?[facility]:[phone]  ?[facility]:[phone]    Stay hydrated.     Follow-up in the clinic.     You have a 3mm nodule in your right lung.  This would not cause your symptoms and you do not need further work-up.     Return here for any worsening or other concerns.         Future Appointments        Provider Department Dept Phone Center    10/6/2017 3:00 PM  SHOT ROOM [facility] [phone] [facility] [place]         Review of your medicines      Our records show that you are taking the medicines listed below. If these are incorrect, please call your family doctor or clinic.        Dose / Directions Last dose taken    albuterol 108 (90 BASE) MCG/ACT Inhaler   Commonly known as:  PROAIR HFA   Dose:  1 puff   Quantity:  1 Inhaler        Inhale 1 puff into the lungs 3 times daily For 3-5 days.   Refills:  0        ALLERGEN IMMUNOTHERAPY PRESCRIPTION   Quantity:  5 mL        Proceed with SCIT per standard buildup protocol.   Refills:  PRN        azelastine-fluticasone 137-50 MCG/ACT nasal spray   Commonly known as:  DYMISTA   Dose:  1 spray   Quantity:  2 Bottle        Spray 1 spray into both nostrils 2 times daily   Refills:  3        dexlansoprazole 60 MG Cpdr CR capsule   Commonly known as:  DEXILANT   Dose:  60 mg        Take 60 mg by mouth daily   Refills:  0        EPINEPHrine 0.3 MG/0.3ML injection   Commonly known as:  EPIPEN   Dose:  0.3 mg   Quantity:  2 each        Inject 0.3 mLs into the muscle once as needed for anaphylaxis for 1 dose.   Refills:  3        fluticasone 50 MCG/ACT spray   Commonly known as:  FLONASE   Dose:  2  spray   Quantity:  16 g        Spray 2 sprays into both nostrils daily   Refills:  11        loratadine 10 MG tablet   Commonly known as:  CLARITIN   Quantity:  30 tablet        TAKE 1 TABLET DAILY.   Refills:  11        multivitamin, therapeutic with minerals Tabs tablet   Dose:  1 tablet        Take 1 tablet by mouth daily   Refills:  0        vitamin D 10736 UNIT capsule   Commonly known as:  ERGOCALCIFEROL   Dose:  2000 Units        Take 2,000 Units by mouth daily   Refills:  0                Procedures and tests performed during your visit     Blood gas arterial and oxyhgb    CBC with platelets differential    CT Chest Angio w and w/o contrast    Comprehensive metabolic panel    D-Dimer ([facility])    Peripheral IV: Standard    Troponin I      Orders Needing Specimen Collection     Ordered          10/06/17 1140  UA reflex to Microscopic - STAT, [facility]: STAT, Needs to be Collected     Scheduled Task Status   10/06/17 1141 Collect UA reflex to Microscopic Open   Order Class:  PCU Collect                  Pending Results     No orders found from 10/4/2017 to 10/7/2017.            Pending Culture Results     No orders found from 10/4/2017 to 10/7/2017.            Thank you for choosing [facility]       Thank you for choosing [facility] for your care. Our goal is always to provide you with excellent care. Hearing back from our patients is one way we can continue to improve our services. Please take a few minutes to complete the written survey that you may receive in the mail after you visit with us. Thank you!        [employer] Entertainment[employer] Information     [employer] gives you secure access to your electronic health record. If you see a primary care provider, you can also send messages to your care team and make appointments. If you have questions, please call your primary care clinic.  If you do not have a primary care provider, please call [phone] and they will assist you.        Care EveryWhere ID     This is your Care EveryWhere ID.  This could be used by other organizations to access your [place] medical records  [id number]        Equal Access to Services     HERMINIO JI : Hadii alonzo Cordova, roberto vazquez, susanna luciano. So [facility] [phone].    ATENCIÓN: Si habla español, tiene a suero disposición servicios gratuitos de asistencia lingüística. Llame al [phone].    We comply with applicable federal civil rights laws and [place] laws. We do not discriminate on the basis of race, color, national origin, age, disability, sex, sexual orientation, or gender identity.            After Visit Summary       This is your record. Keep this with you and show to your community pharmacist(s) and doctor(s) at your next visit.

## 2017-10-06 NOTE — ED AVS SNAPSHOT
[facility]    [address] [address]    Phone:  [phone]                                       Sydni Isabel   MRN: [id number]    Department:  [facility]   Date of Visit:  10/6/2017           After Visit Summary Signature Page     I have received my discharge instructions, and my questions have been answered. I have discussed any challenges I see with this plan with the nurse or doctor.    ..........................................................................................................................................  Patient/Patient Representative Signature      ..........................................................................................................................................  Patient Representative Print Name and Relationship to Patient    ..................................................               ................................................  Date                                            Time    ..........................................................................................................................................  Reviewed by Signature/Title    ...................................................              ..............................................  Date                                                            Time

## 2017-10-06 NOTE — ED NOTES
"35 y/o female presents with c/o chest pain, SOB, nausea and dizziness over the last week. Reports chest pain to mid to R chest - rates 9/10 and describes as \"a tightness\". C/O nausea, no vomiting and no appetite - reports a weight loss of 10 lbs in 7 days. States she has not be drinking enough fluids either. Reports dizziness is worse with position changes.   "

## 2017-10-06 NOTE — DISCHARGE INSTRUCTIONS
What to expect when you have contrast    During your exam, we will inject  contrast  into your vein or artery. (Contrast is a clear liquid with iodine in it. It shows up on X-rays.)    You may feel warm or hot. You may have a metal taste in your mouth and a slight upset stomach. You may also feel pressure near the kidneys and bladder. These effects will last about 1 to 3 minutes.    Please tell us if you have:    Sneezing     Itching    Hives     Swelling in the face    A hoarse voice    Breathing problems    Other new symptoms    Serious problems are rare.  They may include:    Irregular heartbeat     Seizures    Kidney failure              Tissue damage    Shock      Death    If you have any problems during the exam, we  will treat them right away.    When you get home    Call your hospital if you have any new symptoms in the next 2 days, like hives or swelling. (Phone numbers are at the bottom of this page.) Or call your family doctor.     If you have wheezing or trouble breathing, call 911.    Self-care  -Drink at least 4 extra glasses of water today.   This reduces the stress on your kidneys.  -Keep taking your regular medicines.    The contrast will pass out of your body in your  Urine(pee). This will happen in the next 24 hours. You  will not feel this. Your urine will not  change color.    If you have kidney problems or take metformin    Drink 4 to 8 large glasses of water for the next  2 days, if you are not on a fluid restriction.    ?If you take metformin (Glucophage or Glucovance) for diabetes, keep taking it.      ?Your kidney function tests are abnormal.  If you take Metformin, do not take it for 48 hours. Please go to your clinic for a blood test within 3 days after your exam before the restarting this medicine.     (Note to provider:please give patient prescription for lab tests.)    ?Special instructions: Drink at least 4 extra glasses of water today.   This reduces the stress on your kidneys.    I  have read and understand the above information.    Patient Sign Here:______________________________________Date:________Time:______    Staff Sign Here:________________________________________Date:_______Time:______      Radiology Departments:     ?[facility]: [phone] ?[facility]: [phone]     ?[facility]: [phone] ?[facility]:[phone]      ?[facility]: [phone]  ?[facility]: [phone]  ?[facility]:[phone]    ?[facility]:[phone]  ?[facility]:[phone]    Stay hydrated.     Follow-up in the clinic.     You have a 3mm nodule in your right lung.  This would not cause your symptoms and you do not need further work-up.     Return here for any worsening or other concerns.

## 2017-10-06 NOTE — ED PROVIDER NOTES
"  History     Chief Complaint   Patient presents with     Chest Pain     Started one week ago, worsening today.     Shortness of Breath     States \"I feel like I'm not breathing normally.\"     Dizziness     The history is provided by the patient.     Sydni Isabel is a 36 year old female who presented to the ED ambulatory for evaluation of a multitude of complaints.  She is concerned over her persistent shortness of breath and chest pain.  She has been seen in the clinic by her primary care physician for similar issues and tells me that her x-ray was \"normal.\"  She is convinced there is something wrong.  Reports recent unintentional weight loss.  Dizziness with position change.  Persistent shortness of breath at rest and with exertion.  Does admit to recent right leg swelling that resolved.  No fevers.  No recent travel.  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     I have reviewed the Medications, Allergies, Past Medical and Surgical History, and Social History in the Epic system.    Allergies: No Known Allergies      No current facility-administered medications on file prior to encounter.   Current Outpatient Prescriptions on File Prior to Encounter:  loratadine (CLARITIN) 10 MG tablet TAKE 1 TABLET DAILY.   dexlansoprazole (DEXILANT) 60 MG CPDR CR capsule Take 60 mg by mouth daily   azelastine-fluticasone (DYMISTA) 137-50 MCG/ACT nasal spray Spray 1 spray into both nostrils 2 times daily   fluticasone (FLONASE) 50 MCG/ACT nasal spray Spray 2 sprays into both nostrils daily   multivitamin, therapeutic with minerals (THERA-VIT-M) TABS Take 1 tablet by mouth daily   albuterol (ALBUTEROL) 108 (90 BASE) MCG/ACT inhaler Inhale 1 puff into the lungs 3 times daily For 3-5 days.   vitamin D (ERGOCALCIFEROL) 25745 UNIT capsule Take 2,000 Units by mouth daily    ORDER FOR ALLERGEN IMMUNOTHERAPY Proceed with SCIT per standard buildup protocol.   EPINEPHrine (EPIPEN) 0.3 MG/0.3ML injection " "Inject 0.3 mLs into the muscle once as needed for anaphylaxis for 1 dose.       Patient Active Problem List   Diagnosis     Threatened      ACP (advance care planning)     Allergic rhinitis     Chronic maxillary sinusitis     Chronic frontal sinusitis     DNS (deviated nasal septum)     Nasal turbinate hypertrophy     Perennial allergic rhinitis with seasonal variation       Past Surgical History:   Procedure Laterality Date     c section times 2       DILATION AND CURETTAGE SUCTION  2014    Procedure: DILATION AND CURETTAGE SUCTION;  Surgeon: Oliver Webb MD;  Location: [facility]     ENT SURGERY      sinus     ESOPHAGOSCOPY, GASTROSCOPY, DUODENOSCOPY (EGD), COMBINED N/A 2016    Procedure: COMBINED ESOPHAGOSCOPY, GASTROSCOPY, DUODENOSCOPY (EGD);  Surgeon: Francois Gordon MD;  Location: [facility] OR     GYN SURGERY      d & c     wisdom teeth         Social History   Substance Use Topics     Smoking status: Never Smoker     Smokeless tobacco: Never Used     Alcohol use No         There is no immunization history on file for this patient.    BMI: Estimated body mass index is 27.44 kg/(m^2) as calculated from the following:    Height as of 17: 1.676 m (5' 6\").    Weight as of 17: 77.1 kg (170 lb).      Review of Systems   Constitutional: Positive for activity change, chills, fatigue and unexpected weight change. Negative for fever.   HENT: Negative.    Eyes: Negative for photophobia and visual disturbance.   Respiratory: Positive for chest tightness and shortness of breath. Negative for cough and wheezing.    Cardiovascular: Positive for chest pain, palpitations and leg swelling.   Gastrointestinal: Negative for abdominal pain, nausea and vomiting.   Genitourinary: Negative.    Musculoskeletal: Negative for back pain and neck pain.   Skin: Negative.    Neurological: Positive for dizziness, weakness and light-headedness. Negative for headaches.       Physical Exam   BP: 125/75  Pulse: 82  Heart Rate: " 77  Temp: 98.5  F (36.9  C)  Resp: 18  SpO2: 97 %  Physical Exam   Constitutional: She is oriented to person, place, and time. She appears well-developed and well-nourished. No distress.   Cardiovascular: Normal rate and regular rhythm.    Pulmonary/Chest: Effort normal and breath sounds normal.   Abdominal: Soft.   Musculoskeletal: She exhibits no edema.   Neurological: She is oriented to person, place, and time.   Skin: Skin is warm and dry.   Psychiatric: She has a normal mood and affect.   Nursing note and vitals reviewed.      ED Course     ED Course     Procedures        EKG shows a NSR without ST or T wave concerns.        Medications   lactated ringers BOLUS 1,000 mL (0 mLs Intravenous Stopped 10/6/17 1317)     Followed by   lactated ringers BOLUS 1,000 mL (0 mLs Intravenous Stopped 10/6/17 1256)     Followed by   lactated ringers infusion (not administered)   iopamidol (ISOVUE-370) solution 75 mL (75 mLs Intravenous Given 10/6/17 1239)   sodium chloride (PF) 0.9% PF flush 100 mL (100 mLs Intravenous Given 10/6/17 1239)     Results for orders placed or performed during the hospital encounter of 10/06/17 (from the past 24 hour(s))   CBC with platelets differential   Result Value Ref [facility]    WBC 6.6 4.0 - 11.0 10e9/L    RBC Count 4.86 3.8 - 5.2 10e12/L    Hemoglobin 14.2 11.7 - 15.7 g/dL    Hematocrit 41.4 35.0 - 47.0 %    MCV 85 78 - 100 fl    MCH 29.2 26.5 - 33.0 pg    MCHC 34.3 31.5 - 36.5 g/dL    RDW 11.8 10.0 - 15.0 %    Platelet Count 264 150 - 450 10e9/L    Diff Method Automated Method     % Neutrophils 66.3 %    % Lymphocytes 26.1 %    % Monocytes 5.3 %    % Eosinophils 1.4 %    % Basophils 0.6 %    % Immature Granulocytes 0.3 %    Nucleated RBCs 0 0 /100    Absolute Neutrophil 4.4 1.6 - 8.3 10e9/L    Absolute Lymphocytes 1.7 0.8 - 5.3 10e9/L    Absolute Monocytes 0.4 0.0 - 1.3 10e9/L    Absolute Eosinophils 0.1 0.0 - 0.7 10e9/L    Absolute Basophils 0.0 0.0 - 0.2 10e9/L    Abs Immature Granulocytes  0.0 0 - 0.4 10e9/L    Absolute Nucleated RBC 0.0    D-Dimer ([facility])   Result Value Ref [facility]    D-Dimer ng/mL <200 0 - 300 ng/ml D-DU   Comprehensive metabolic panel   Result Value Ref [facility]    Sodium 139 133 - 144 mmol/L    Potassium 3.6 3.4 - 5.3 mmol/L    Chloride 105 94 - 109 mmol/L    Carbon Dioxide 23 20 - 32 mmol/L    Anion Gap 11 3 - 14 mmol/L    Glucose 76 70 - 99 mg/dL    Urea Nitrogen 10 7 - 30 mg/dL    Creatinine 0.59 0.52 - 1.04 mg/dL    GFR Estimate >90 >60 mL/min/1.7m2    GFR Estimate If Black >90 >60 mL/min/1.7m2    Calcium 8.9 8.5 - 10.1 mg/dL    Bilirubin Total 0.9 0.2 - 1.3 mg/dL    Albumin 4.0 3.4 - 5.0 g/dL    Protein Total 8.3 6.8 - 8.8 g/dL    Alkaline Phosphatase 58 40 - 150 U/L    ALT 18 0 - 50 U/L    AST 15 0 - 45 U/L   Troponin I   Result Value Ref [facility]    Troponin I ES <0.015 0.000 - 0.045 ug/L   Blood gas arterial and oxyhgb   Result Value Ref [facility]    pH Arterial 7.37 7.35 - 7.45 pH    pCO2 Arterial 32 (L) 35 - 45 mm Hg    pO2 Arterial 103 80 - 105 mm Hg    Bicarbonate Arterial 18 (L) 21 - 28 mmol/L    FIO2 21%     Oxyhemoglobin Arterial 98 92 - 100 %    Base Deficit Art 5.7 mmol/L   CT Chest Angio w and w/o contrast    Narrative    CTA ANGIOGRAM CHEST CONTRAST  10/6/2017 12:40 PM    CLINICAL HISTORY: Female, age 36 years,  PE;    Comparison:  None    TECHNIQUE:  CT was performed of the chest  with IV contrast.   Sagittal, coronal and axial reconstructions were reviewed.     FINDINGS:  Chest CT:   Lungs : 3.0 mm noncalcified nodule located laterally in the right  lower lobe, image #76, series 5 and series 4.  Thyroid: The visualized portions are normal.  Heart and Great Vessels:  Good quality examination. No evidence of  pulmonary embolus.  Lymph Nodes:  Normal.  Pleura: Normal.  Bony Structures:  Minimal degenerative changes.    Visualized portions of the abdomen:  Liver:  The visualized portions are normal.  Gallbladder: The visualized portions are normal.  Spleen: The visualized  portions are normal.  Pancreas: The visualized portions are normal.  Adrenal Glands: Normal  Kidneys: The visualized portions are normal.  Ureters: Not included.  Abdominal Aorta: The visualized portions are normal.    Other Findings:  Large volume of stool within the visualized portions  of the colon.      Impression    IMPRESSION:  1.   Good quality examination demonstrates no evidence of pulmonary  embolus.    2.  3.0 mm noncalcified nodule laterally within the right lower lobe.  Per the Fleischner Society Guidelines (Radiology 2005; 237:395-400),  for patients at low risk for malignancy no further imaging follow-up  is suggested. For patients at higher risk, such as smokers, a  follow-up chest CT in 12 months is suggested. For patients with known  malignancy, a 3 month follow-up chest CT is suggested, if no  intervention is planned.    KATE BALLESTEROS MD       Critical Care time:  none               Labs Ordered and Resulted from Time of ED Arrival Up to the Time of Departure from the ED   BLOOD GAS ARTERIAL AND OXYHGB - Abnormal; Notable for the following:        Result Value    pCO2 Arterial 32 (*)     Bicarbonate Arterial 18 (*)     All other components within normal limits   CBC WITH PLATELETS DIFFERENTIAL   D-DIMER ([facility],)   COMPREHENSIVE METABOLIC PANEL   TROPONIN I   URINE MACROSCOPIC WITH REFLEX TO MICRO   PERIPHERAL IV CATHETER       Assessments & Plan (with Medical Decision Making)   Work-up as above.  May need further neuro testing.  No emergent findings. Follow-up in the clinic.  Return here for any worsening or other concerns.     I have reviewed the nursing notes.    I have reviewed the findings, diagnosis, plan and need for follow up with the patient.       Discharge Medication List as of 10/6/2017  1:17 PM          Final diagnoses:   Shortness of breath   Atypical chest pain   Postural dizziness       10/6/2017   [facility]     Alex Araya PA-C  10/06/17 7884

## 2017-10-09 DIAGNOSIS — J30.89 PERENNIAL ALLERGIC RHINITIS WITH SEASONAL VARIATION: ICD-10-CM

## 2017-10-09 DIAGNOSIS — J30.2 PERENNIAL ALLERGIC RHINITIS WITH SEASONAL VARIATION: ICD-10-CM

## 2017-10-09 DIAGNOSIS — J30.2 SEASONAL ALLERGIC RHINITIS: ICD-10-CM

## 2017-10-09 DIAGNOSIS — J30.1 ALLERGIC RHINITIS DUE TO POLLEN: ICD-10-CM

## 2017-10-10 NOTE — TELEPHONE ENCOUNTER
Montelukast SOD 10 MG       Last Written Prescription Date: not on med list  Last Fill Quantity: N/A, # refills: 0    Last Office Visit with [facility], [facility] or [facility] prescribing provider:  9-26-17   Future Office Visit: none    Date of Last Asthma Action Plan Letter:   There are no preventive care reminders to display for this patient.   Asthma Control Test: No flowsheet data found.    Date of Last Spirometry Test:   No results found for this or any previous visit.

## 2017-10-11 ENCOUNTER — TRANSFERRED RECORDS (OUTPATIENT)
Dept: HEALTH INFORMATION MANAGEMENT | Facility: HOSPITAL | Age: 37
End: 2017-10-11

## 2017-10-12 RX ORDER — MONTELUKAST SODIUM 10 MG/1
TABLET ORAL
Qty: 90 TABLET | Refills: 3 | Status: SHIPPED | OUTPATIENT
Start: 2017-10-12 | End: 2019-06-14

## 2017-10-13 ENCOUNTER — MED INFO FORMS (OUTPATIENT)
Dept: FAMILY MEDICINE | Age: 37
End: 2017-10-13

## 2017-10-17 ENCOUNTER — HOSPITAL ENCOUNTER (OUTPATIENT)
Dept: NUCLEAR MEDICINE | Facility: HOSPITAL | Age: 37
Setting detail: NUCLEAR MEDICINE
End: 2017-10-17
Attending: FAMILY MEDICINE
Payer: COMMERCIAL

## 2017-10-17 ENCOUNTER — TELEPHONE (OUTPATIENT)
Dept: OTOLARYNGOLOGY | Facility: OTHER | Age: 37
End: 2017-10-17

## 2017-10-17 ENCOUNTER — HOSPITAL ENCOUNTER (OUTPATIENT)
Dept: NUCLEAR MEDICINE | Facility: HOSPITAL | Age: 37
Discharge: HOME | End: 2017-10-17
Attending: FAMILY MEDICINE | Admitting: FAMILY MEDICINE
Payer: COMMERCIAL

## 2017-10-17 ENCOUNTER — HOSPITAL ENCOUNTER (OUTPATIENT)
Dept: CARDIOLOGY | Facility: HOSPITAL | Age: 37
Setting detail: NUCLEAR MEDICINE
End: 2017-10-17
Attending: FAMILY MEDICINE
Payer: COMMERCIAL

## 2017-10-17 DIAGNOSIS — J34.2 DEVIATED NASAL SEPTUM: ICD-10-CM

## 2017-10-17 DIAGNOSIS — R51.9 FACIAL PAIN: ICD-10-CM

## 2017-10-17 DIAGNOSIS — J34.3 NASAL TURBINATE HYPERTROPHY: ICD-10-CM

## 2017-10-17 DIAGNOSIS — R06.09 DOE (DYSPNEA ON EXERTION): ICD-10-CM

## 2017-10-17 DIAGNOSIS — R07.89 CHEST TIGHTNESS: ICD-10-CM

## 2017-10-17 DIAGNOSIS — J34.829 NASAL VALVE COLLAPSE: ICD-10-CM

## 2017-10-17 DIAGNOSIS — J32.4 CHRONIC PANSINUSITIS: Primary | ICD-10-CM

## 2017-10-17 PROCEDURE — A9500 TC99M SESTAMIBI: HCPCS | Performed by: RADIOLOGY

## 2017-10-17 PROCEDURE — 34300033 ZZH RX 343: Performed by: RADIOLOGY

## 2017-10-17 PROCEDURE — 93017 CV STRESS TEST TRACING ONLY: CPT

## 2017-10-17 PROCEDURE — 78452 HT MUSCLE IMAGE SPECT MULT: CPT | Mod: TC

## 2017-10-17 PROCEDURE — 25000128 H RX IP 250 OP 636: Performed by: INTERNAL MEDICINE

## 2017-10-17 PROCEDURE — 93018 CV STRESS TEST I&R ONLY: CPT | Performed by: INTERNAL MEDICINE

## 2017-10-17 PROCEDURE — 93016 CV STRESS TEST SUPVJ ONLY: CPT | Performed by: INTERNAL MEDICINE

## 2017-10-17 RX ORDER — REGADENOSON 0.08 MG/ML
0.4 INJECTION, SOLUTION INTRAVENOUS ONCE
Status: COMPLETED | OUTPATIENT
Start: 2017-10-17 | End: 2017-10-17

## 2017-10-17 RX ORDER — SODIUM CHLORIDE 9 MG/ML
INJECTION, SOLUTION INTRAVENOUS ONCE
Status: COMPLETED | OUTPATIENT
Start: 2017-10-17 | End: 2017-10-17

## 2017-10-17 RX ADMIN — SODIUM CHLORIDE, PRESERVATIVE FREE: 5 INJECTION INTRAVENOUS at 08:13

## 2017-10-17 RX ADMIN — REGADENOSON 0.4 MG: 0.08 INJECTION, SOLUTION INTRAVENOUS at 08:12

## 2017-10-17 RX ADMIN — Medication 10 MILLICURIE: at 06:45

## 2017-10-17 RX ADMIN — SODIUM CHLORIDE, PRESERVATIVE FREE: 5 INJECTION INTRAVENOUS at 08:07

## 2017-10-17 RX ADMIN — Medication 30 MILLICURIE: at 08:14

## 2017-10-17 NOTE — TELEPHONE ENCOUNTER
This patient calls today to schedule her sinus surgery with Dr. Quiroz. She has picked 12/6/17. She will be making her own pre op appointment and our  will call her to make the post op appointments.

## 2017-10-20 ENCOUNTER — ALLIED HEALTH/NURSE VISIT (OUTPATIENT)
Dept: ALLERGY | Facility: OTHER | Age: 37
End: 2017-10-20
Attending: PHYSICIAN ASSISTANT
Payer: COMMERCIAL

## 2017-10-20 DIAGNOSIS — J30.1 ALLERGIC RHINITIS DUE TO POLLEN: Primary | ICD-10-CM

## 2017-10-20 PROCEDURE — 95115 IMMUNOTHERAPY ONE INJECTION: CPT

## 2017-10-20 NOTE — PROGRESS NOTES
Allergy injection/s given and charted on paper allergy flow sheet.   Prior to injection verified patient identity using patient's name and date of birth.      Elke Crum

## 2017-10-20 NOTE — MR AVS SNAPSHOT
After Visit Summary   10/20/2017    Sydni Isabel    MRN: [id number]           Patient Information     Date Of Birth          1980        Visit Information        Provider Department      10/20/2017 9:15 AM HC SHOT ROOM [facility]        Today's Diagnoses     Allergic rhinitis due to pollen    -  1       Follow-ups after your visit        Your next 10 appointments already scheduled     Nov 21, 2017  9:30 AM CST   Pulmonary Function with [facility]   [facility] ([facility] )    [address]  [place] [address]   [phone]           No Inhalers for 6 hours prior to test No Smoking 2 hours prior to test            Dec 06, 2017   Procedure with Yulisa Quiroz MD   [facility] ([facility] )    [address] [place] [address] [phone]            Dec 27, 2017 10:30 AM CST   (Arrive by 10:15 AM)   [facility]   [facility] [place] [facility]    [address] [facility] [address]   [phone]            Jan 11, 2018 10:30 AM CST   (Arrive by 10:15 AM)   Return Visit with Yulisa [facility]iroz MD   [facility] ([facility] [place] )    [address] [facility] [address]   [phone]              Who to contact     If you have questions or need follow up information about today's clinic visit or your schedule please contact [facility] directly at [phone].  Normal or non-critical lab and imaging results will be communicated to you by MyChart, letter or phone within 4 business days after the clinic has received the results. If you do not hear from us within 7 days, please contact the clinic through [facility] or phone. If you have a critical or abnormal lab result, we will notify you by phone as soon as possible.  Submit refill requests through [facility] or call your pharmacy and they will forward the refill request to  us. Please allow 3 business days for your refill to be completed.          Additional Information About Your Visit        MyChart Information     [facility] gives you secure access to your electronic health record. If you see a primary care provider, you can also send messages to your care team and make appointments. If you have questions, please call your primary care clinic.  If you do not have a primary care provider, please call [phone] and they will assist you.        Care EveryWhere ID     This is your Care EveryWhere ID. This could be used by other organizations to access your [place] medical records  [id number]         Blood Pressure from Last 3 Encounters:   10/06/17 127/73   09/26/17 100/64   09/03/17 107/72    Weight from Last 3 Encounters:   09/26/17 170 lb (77.1 kg)   07/10/17 174 lb (78.9 kg)   04/28/17 179 lb (81.2 kg)              Today, you had the following     No orders found for display       Primary Care Provider Office Phone # Fax #    Go Villarreal [phone]       [facility] [address] [facility] [address]        Equal Access to Services     [facility]: Hadii aad ku hadasho Sojoshuaali, waaxda luqadaha, qaybta kaalmada aderhondayada, susanna maier . So [facility] [phone].    ATENCIÓN: Si habla español, tiene a suero disposición servicios gratuitos de asistencia lingüística. Llame al [phone].    We comply with applicable federal civil rights laws and [place] laws. We do not discriminate on the basis of race, color, national origin, age, disability, sex, sexual orientation, or gender identity.            Thank you!     Thank you for choosing [facility]  for your care. Our goal is always to provide you with excellent care. Hearing back from our patients is one way we can continue to improve our services. Please take a few minutes to complete the written survey that you may receive in the mail after your visit with us.  Thank you!             Your Updated Medication List - Protect others around you: Learn how to safely use, store and throw away your medicines at www.disposemymeds.org.          This list is accurate as of: 10/20/17  9:23 AM.  Always use your most recent med list.                   Brand Name Dispense Instructions for use Diagnosis    albuterol 108 (90 BASE) MCG/ACT Inhaler    PROAIR HFA    1 Inhaler    Inhale 1 puff into the lungs 3 times daily For 3-5 days.        ALLERGEN IMMUNOTHERAPY PRESCRIPTION     5 mL    Proceed with SCIT per standard buildup protocol.    Perennial allergic rhinitis       azelastine-fluticasone 137-50 MCG/ACT nasal spray    DYMISTA    2 Bottle    Spray 1 spray into both nostrils 2 times daily    Perennial allergic rhinitis with seasonal variation, Seasonal allergic rhinitis, unspecified allergic rhinitis trigger       dexlansoprazole 60 MG Cpdr CR capsule    DEXILANT     Take 60 mg by mouth daily        EPINEPHrine 0.3 MG/0.3ML injection    EPIPEN    2 each    Inject 0.3 mLs into the muscle once as needed for anaphylaxis for 1 dose.    Chronic rhinitis       fluticasone 50 MCG/ACT spray    FLONASE    16 g    Spray 2 sprays into both nostrils daily    Chronic rhinitis       loratadine 10 MG tablet    CLARITIN    30 tablet    TAKE 1 TABLET DAILY.    Allergic rhinitis due to pollen, Seasonal allergic rhinitis, Perennial allergic rhinitis with seasonal variation       montelukast 10 MG tablet    SINGULAIR    90 tablet    TAKE ONE TABLET AT BEDTIME.    Perennial allergic rhinitis with seasonal variation, Seasonal allergic rhinitis, Allergic rhinitis due to pollen       multivitamin, therapeutic with minerals Tabs tablet      Take 1 tablet by mouth daily        vitamin D 01030 UNIT capsule    ERGOCALCIFEROL     Take 2,000 Units by mouth daily

## 2017-11-02 ENCOUNTER — ALLIED HEALTH/NURSE VISIT (OUTPATIENT)
Dept: ALLERGY | Facility: OTHER | Age: 37
End: 2017-11-02
Attending: PHYSICIAN ASSISTANT
Payer: COMMERCIAL

## 2017-11-02 DIAGNOSIS — J30.1 ALLERGIC RHINITIS DUE TO POLLEN, UNSPECIFIED CHRONICITY, UNSPECIFIED SEASONALITY: Primary | ICD-10-CM

## 2017-11-02 PROCEDURE — 95115 IMMUNOTHERAPY ONE INJECTION: CPT

## 2017-11-02 NOTE — MR AVS SNAPSHOT
After Visit Summary   11/2/2017    Sydni Isabel    MRN: [id number]           Patient Information     Date Of Birth          1980        Visit Information        Provider Department      11/2/2017 3:30 PM HC SHOT ROOM [facility]        Today's Diagnoses     Allergic rhinitis due to pollen, unspecified chronicity, unspecified seasonality    -  1       Follow-ups after your visit        Your next 10 appointments already scheduled     Nov 10, 2017  2:45 PM CST   injection with HC SHOT ROOM   [facility] [place] ([facility] [place] )    [address]  [place] [address]   [phone]            Nov 21, 2017  9:30 AM CST   Pulmonary Function with HI PULMONARY LAB   [facility] ([facility] )    [address] [place] [address] [phone]           No Inhalers for 6 hours prior to test No Smoking 2 hours prior to test            Dec 20, 2017   Procedure with Yulisa Quiroz MD   [facility] ([facility] )    [address]  [place] [address] [phone]            Dec 27, 2017 10:30 AM CST   (Arrive by 10:15 AM)   Post Op with Martina Dominguez PA-C   [facility] [place] ([facility] [place] )    [address]  [place] [address]   [phone]            Jan 11, 2018 10:30 AM CST   (Arrive by 10:15 AM)   Return Visit with Yulisa Quiroz MD   [facility] ([facility] [place] )    [address]  [place] [address]   [phone]              Who to contact     If you have questions or need follow up information about today's clinic visit or your schedule please contact [facility] directly at [phone].  Normal or non-critical lab and imaging results will be communicated to you by MyChart, letter or phone within 4 business days after the clinic has received the results. If you do not hear from us within 7 days, please contact the  clinic through [employer] or phone. If you have a critical or abnormal lab result, we will notify you by phone as soon as possible.  Submit refill requests through [employer] or call your pharmacy and they will forward the refill request to us. Please allow 3 business days for your refill to be completed.          Additional Information About Your Visit        [employer] Information     [employer] gives you secure access to your electronic health record. If you see a primary care provider, you can also send messages to your care team and make appointments. If you have questions, please call your primary care clinic.  If you do not have a primary care provider, please call [phone] and they will assist you.        Care EveryWhere ID     This is your Care EveryWhere ID. This could be used by other organizations to access your [place] medical records  [id number]         Blood Pressure from Last 3 Encounters:   10/06/17 127/73   09/26/17 100/64   09/03/17 107/72    Weight from Last 3 Encounters:   09/26/17 170 lb (77.1 kg)   07/10/17 174 lb (78.9 kg)   04/28/17 179 lb (81.2 kg)              We Performed the Following     Allergy Shot: One injection        Primary Care Provider Office Phone # Fax #    Go Villarreal [phone]       [facility] [address]  [facility] [address]        Equal Access to Services     [facility] MARGY : Hadii aad ku hadasho Soomaali, waaxda luqadaha, qaybta kaalmada adeegyada, susanna maier . So [facility] [phone].    ATENCIÓN: Si habla español, tiene a suero disposición servicios gratuitos de asistencia lingüística. Letty al [phone].    We comply with applicable federal civil rights laws and [place] laws. We do not discriminate on the basis of race, color, national origin, age, disability, sex, sexual orientation, or gender identity.            Thank you!     Thank you for choosing [facility]  for your care. Our goal is always  to provide you with excellent care. Hearing back from our patients is one way we can continue to improve our services. Please take a few minutes to complete the written survey that you may receive in the mail after your visit with us. Thank you!             Your Updated Medication List - Protect others around you: Learn how to safely use, store and throw away your medicines at www.disposemymeds.org.          This list is accurate as of: 11/2/17  3:55 PM.  Always use your most recent med list.                   Brand Name Dispense Instructions for use Diagnosis    albuterol 108 (90 BASE) MCG/ACT Inhaler    PROAIR HFA    1 Inhaler    Inhale 1 puff into the lungs 3 times daily For 3-5 days.        ALLERGEN IMMUNOTHERAPY PRESCRIPTION     5 mL    Proceed with SCIT per standard buildup protocol.    Perennial allergic rhinitis       azelastine-fluticasone 137-50 MCG/ACT nasal spray    DYMISTA    2 Bottle    Spray 1 spray into both nostrils 2 times daily    Perennial allergic rhinitis with seasonal variation, Seasonal allergic rhinitis, unspecified allergic rhinitis trigger       dexlansoprazole 60 MG Cpdr CR capsule    DEXILANT     Take 60 mg by mouth daily        EPINEPHrine 0.3 MG/0.3ML injection    EPIPEN    2 each    Inject 0.3 mLs into the muscle once as needed for anaphylaxis for 1 dose.    Chronic rhinitis       fluticasone 50 MCG/ACT spray    FLONASE    16 g    Spray 2 sprays into both nostrils daily    Chronic rhinitis       loratadine 10 MG tablet    CLARITIN    30 tablet    TAKE 1 TABLET DAILY.    Allergic rhinitis due to pollen, Seasonal allergic rhinitis, Perennial allergic rhinitis with seasonal variation       montelukast 10 MG tablet    SINGULAIR    90 tablet    TAKE ONE TABLET AT BEDTIME.    Perennial allergic rhinitis with seasonal variation, Seasonal allergic rhinitis, Allergic rhinitis due to pollen       multivitamin, therapeutic with minerals Tabs tablet      Take 1 tablet by mouth daily         vitamin D 15603 UNIT capsule    ERGOCALCIFEROL     Take 2,000 Units by mouth daily

## 2017-11-02 NOTE — PROGRESS NOTES
Prior to injection verified patient identity using patient's name and date of birth.    Allergy injection/s given and charted on paper allergy flow sheet.  Patient signed out AMA and did not stay for observation.    Apoorva Ryan RN

## 2017-11-15 ENCOUNTER — ALLIED HEALTH/NURSE VISIT (OUTPATIENT)
Dept: ALLERGY | Facility: OTHER | Age: 37
End: 2017-11-15
Attending: PHYSICIAN ASSISTANT
Payer: COMMERCIAL

## 2017-11-15 DIAGNOSIS — J30.1 ALLERGIC RHINITIS DUE TO POLLEN, UNSPECIFIED CHRONICITY, UNSPECIFIED SEASONALITY: Primary | ICD-10-CM

## 2017-11-15 PROCEDURE — 95115 IMMUNOTHERAPY ONE INJECTION: CPT

## 2017-11-15 NOTE — MR AVS SNAPSHOT
After Visit Summary   11/15/2017    Sydni Isabel    MRN: [id number]           Patient Information     Date Of Birth          1980        Visit Information        Provider Department      11/15/2017 3:00 PM HC SHOT ROOM [facility]        Today's Diagnoses     Allergic rhinitis due to pollen, unspecified chronicity, unspecified seasonality    -  1       Follow-ups after your visit        Your next 10 appointments already scheduled     Nov 21, 2017  9:30 AM CST   Pulmonary Function with HI PULMONARY LAB   [facility] Res[facility] Therapy ([facility] )    [address] [address]   [phone]           No Inhalers for 6 hours prior to test No Smoking 2 hours prior to test            Dec 13, 2017  9:30 AM CST   (Arrive by 9:15 AM)   Post Op with Martina Dominguez PA-C   [facility] ([facility] )    [address] [facility] [address]   [phone]            Dec 20, 2017   Procedure with Yulisa Quiroz MD   [facility] ([facility] )    [address] [place] [address] [phone]            Jan 11, 2018 10:30 AM CST   (Arrive by 10:15 AM)   Return Visit with Yulisa Quiroz MD   [facility] ([facility] )    [address] [facility] [address]   [phone]              Who to contact     If you have questions or need follow up information about today's clinic visit or your schedule please contact [facility] directly at [phone].  Normal or non-critical lab and imaging results will be communicated to you by MyChart, letter or phone within 4 business days after the clinic has received the results. If you do not hear from us within 7 days, please contact the clinic through MyChart or phone. If you have a critical or abnormal lab result, we will notify you by phone as soon as possible.  Submit refill requests through [employer] or call your  pharmacy and they will forward the refill request to us. Please allow 3 business days for your refill to be completed.          Additional Information About Your Visit        MyChart Information     [employer] gives you secure access to your electronic health record. If you see a primary care provider, you can also send messages to your care team and make appointments. If you have questions, please call your primary care clinic.  If you do not have a primary care provider, please call [phone] and they will assist you.        Care EveryWhere ID     This is your Care EveryWhere ID. This could be used by other organizations to access your [place] medical records  [id number]         Blood Pressure from Last 3 Encounters:   10/06/17 127/73   09/26/17 100/64   09/03/17 107/72    Weight from Last 3 Encounters:   09/26/17 170 lb (77.1 kg)   07/10/17 174 lb (78.9 kg)   04/28/17 179 lb (81.2 kg)              We Performed the Following     Allergy Shot: One injection        Primary Care Provider Office Phone # Fax #    Go Villarreal [phone]       [facility] [address]  [facility] [address]        Equal Access to Services     [facility]: Hadii aad ku hadasho Soomaali, waaxda luqadaha, qaybta kaalmada adeegyada, susanna maier . So [facility] [phone].    ATENCIÓN: Si habla español, tiene a suero disposición servicios gratuitos de asistencia lingüística. Letty al [phone].    We comply with applicable federal civil rights laws and [place] laws. We do not discriminate on the basis of race, color, national origin, age, disability, sex, sexual orientation, or gender identity.            Thank you!     Thank you for choosing [facility]  for your care. Our goal is always to provide you with excellent care. Hearing back from our patients is one way we can continue to improve our services. Please take a few minutes to complete the written survey that  you may receive in the mail after your visit with us. Thank you!             Your Updated Medication List - Protect others around you: Learn how to safely use, store and throw away your medicines at www.disposemymeds.org.          This list is accurate as of: 11/15/17  3:51 PM.  Always use your most recent med list.                   Brand Name Dispense Instructions for use Diagnosis    albuterol 108 (90 BASE) MCG/ACT Inhaler    PROAIR HFA    1 Inhaler    Inhale 1 puff into the lungs 3 times daily For 3-5 days.        ALLERGEN IMMUNOTHERAPY PRESCRIPTION     5 mL    Proceed with SCIT per standard buildup protocol.    Perennial allergic rhinitis       azelastine-fluticasone 137-50 MCG/ACT nasal spray    DYMISTA    2 Bottle    Spray 1 spray into both nostrils 2 times daily    Perennial allergic rhinitis with seasonal variation, Seasonal allergic rhinitis, unspecified allergic rhinitis trigger       dexlansoprazole 60 MG Cpdr CR capsule    DEXILANT     Take 60 mg by mouth daily        EPINEPHrine 0.3 MG/0.3ML injection    EPIPEN    2 each    Inject 0.3 mLs into the muscle once as needed for anaphylaxis for 1 dose.    Chronic rhinitis       fluticasone 50 MCG/ACT spray    FLONASE    16 g    Spray 2 sprays into both nostrils daily    Chronic rhinitis       loratadine 10 MG tablet    CLARITIN    30 tablet    TAKE 1 TABLET DAILY.    Allergic rhinitis due to pollen, Seasonal allergic rhinitis, Perennial allergic rhinitis with seasonal variation       montelukast 10 MG tablet    SINGULAIR    90 tablet    TAKE ONE TABLET AT BEDTIME.    Perennial allergic rhinitis with seasonal variation, Seasonal allergic rhinitis, Allergic rhinitis due to pollen       multivitamin, therapeutic with minerals Tabs tablet      Take 1 tablet by mouth daily        vitamin D 38090 UNIT capsule    ERGOCALCIFEROL     Take 2,000 Units by mouth daily

## 2017-11-17 ENCOUNTER — OFFICE VISIT (OUTPATIENT)
Dept: CHIROPRACTIC MEDICINE | Facility: OTHER | Age: 37
End: 2017-11-17
Attending: CHIROPRACTOR
Payer: COMMERCIAL

## 2017-11-17 DIAGNOSIS — M99.03 SEGMENTAL AND SOMATIC DYSFUNCTION OF LUMBAR REGION: Primary | ICD-10-CM

## 2017-11-17 DIAGNOSIS — M99.02 SEGMENTAL AND SOMATIC DYSFUNCTION OF THORACIC REGION: ICD-10-CM

## 2017-11-17 DIAGNOSIS — M54.50 ACUTE BILATERAL LOW BACK PAIN WITHOUT SCIATICA: ICD-10-CM

## 2017-11-17 PROCEDURE — 98940 CHIROPRACT MANJ 1-2 REGIONS: CPT | Mod: AT | Performed by: CHIROPRACTOR

## 2017-11-17 NOTE — MR AVS SNAPSHOT
After Visit Summary   11/17/2017    Sydni Isabel    MRN: [id number]           Patient Information     Date Of Birth          1980        Visit Information        Provider Department      11/17/2017 8:20 AM [facility] [place] [facility]        Today's Diagnoses     Segmental and somatic dysfunction of lumbar region    -  1    Acute bilateral low back pain without sciatica        Segmental and somatic dysfunction of thoracic region           Follow-ups after your visit        Your next 10 appointments already scheduled     Nov 21, 2017  9:30 AM CST   Pulmonary Function with HI PULMONARY LAB   [facility] Respiratory Therapy ([facility] )    [address] [address]   [phone]           No Inhalers for 6 hours prior to test No Smoking 2 hours prior to test            Dec 20, 2017   Procedure with Yulisa Quiroz MD   [facility] ([facility] )    [address] [place] [address] [phone]            Dec 27, 2017 11:15 AM CST   (Arrive by 11:00 AM)   Post Op with Martina Dominguez PA-C   [facility] ([facility] )    [address] [facility] [address]   [phone]            Jan 11, 2018 10:30 AM CST   (Arrive by 10:15 AM)   Return Visit with Yulisa Quiroz MD   [facility] ([facility] )    [address] [facility] [address]   [phone]              Who to contact     If you have questions or need follow up information about today's clinic visit or your schedule please contact  [facility] directly at [phone].  Normal or non-critical lab and imaging results will be communicated to you by MyChart, letter or phone within 4 business days after the clinic has received the results. If you do not hear from us within 7 days, please contact the clinic through MyChart or phone. If you have a critical or abnormal lab result, we will notify  you by phone as soon as possible.  Submit refill requests through [employer] or call your pharmacy and they will forward the refill request to us. Please allow 3 business days for your refill to be completed.          Additional Information About Your Visit        [employer] Information     [employer] gives you secure access to your electronic health record. If you see a primary care provider, you can also send messages to your care team and make appointments. If you have questions, please call your primary care clinic.  If you do not have a primary care provider, please call [phone] and they will assist you.        Care EveryWhere ID     This is your Care EveryWhere ID. This could be used by other organizations to access your [place] medical records  [id number]         Blood Pressure from Last 3 Encounters:   10/06/17 127/73   09/26/17 100/64   09/03/17 107/72    Weight from Last 3 Encounters:   09/26/17 170 lb (77.1 kg)   07/10/17 174 lb (78.9 kg)   04/28/17 179 lb (81.2 kg)              We Performed the Following     CHIROPRAC MANIP,SPINAL,1-2 REGIONS        Primary Care Provider Office Phone # Fax #    Go Villarreal [phone]       [facility] [address] [facility] [address]        Equal Access to Services     [facility] JI : Hadii aad ku hadasho Soomaali, waaxda luqadaha, qaybta kaalmada adeegyada, waxay aneudy maier . So [facility] [phone].    ATENCIÓN: Si habla español, tiene a suero disposición servicios gratuitos de asistencia lingüística. Llspeedy al [phone].    We comply with applicable federal civil rights laws and [place] laws. We do not discriminate on the basis of race, color, national origin, age, disability, sex, sexual orientation, or gender identity.            Thank you!     Thank you for choosing  [facility]  for your care. Our goal is always to provide you with excellent care. Hearing back from our patients is one way we can  continue to improve our services. Please take a few minutes to complete the written survey that you may receive in the mail after your visit with us. Thank you!             Your Updated Medication List - Protect others around you: Learn how to safely use, store and throw away your medicines at www.disposemymeds.org.          This list is accurate as of: 11/17/17  8:33 AM.  Always use your most recent med list.                   Brand Name Dispense Instructions for use Diagnosis    albuterol 108 (90 BASE) MCG/ACT Inhaler    PROAIR HFA    1 Inhaler    Inhale 1 puff into the lungs 3 times daily For 3-5 days.        ALLERGEN IMMUNOTHERAPY PRESCRIPTION     5 mL    Proceed with SCIT per standard buildup protocol.    Perennial allergic rhinitis       azelastine-fluticasone 137-50 MCG/ACT nasal spray    DYMISTA    2 Bottle    Spray 1 spray into both nostrils 2 times daily    Perennial allergic rhinitis with seasonal variation, Seasonal allergic rhinitis, unspecified allergic rhinitis trigger       dexlansoprazole 60 MG Cpdr CR capsule    DEXILANT     Take 60 mg by mouth daily        EPINEPHrine 0.3 MG/0.3ML injection    EPIPEN    2 each    Inject 0.3 mLs into the muscle once as needed for anaphylaxis for 1 dose.    Chronic rhinitis       fluticasone 50 MCG/ACT spray    FLONASE    16 g    Spray 2 sprays into both nostrils daily    Chronic rhinitis       loratadine 10 MG tablet    CLARITIN    30 tablet    TAKE 1 TABLET DAILY.    Allergic rhinitis due to pollen, Seasonal allergic rhinitis, Perennial allergic rhinitis with seasonal variation       montelukast 10 MG tablet    SINGULAIR    90 tablet    TAKE ONE TABLET AT BEDTIME.    Perennial allergic rhinitis with seasonal variation, Seasonal allergic rhinitis, Allergic rhinitis due to pollen       multivitamin, therapeutic with minerals Tabs tablet      Take 1 tablet by mouth daily        vitamin D 57018 UNIT capsule    ERGOCALCIFEROL     Take 2,000 Units by mouth daily

## 2017-11-17 NOTE — PROGRESS NOTES
Subjective Finding:    Chief compalint: Patient presents with:  Back Pain: mid and low back pain  , Pain Scale: 5/10, Intensity: sharp, Duration: 1 months, Radiating: no.    Date of injury:     Activities that the pain restricts:   Home/household/hobbies/social activities: no.  Work duties: no.  Sleep: no.  Makes symptoms better: rest.  Makes symptoms worse: activity, cervical extension and cervical flexion.  Have you seen anyone else for the symptoms? MD.  Work related: no.  Automobile related injury: no.    Objective and Assessment:    Posture Analysis:   High shoulder: .  Head tilt: .  High iliac crest: .  Head carriage: forward.  Thoracic Kyphosis: neutral.  Lumbar Lordosis: neutral.    Lumbar Range of Motion: .  Cervical Range of Motion: .  Thoracic Range of Motion: left lateral flexion decreased.  Extremity Range of Motion: .    Palpation:   Lev scapulae: stiff, referred pain: no  Traps: sharp pain, referred pain: no    Segmental dysfunction pre-treatment and treatment area: T567  L45.    Assessment post-treatment:  Cervical: ROM increased.  Thoracic: ROM increased.  Lumbar: ROM increased.    Comments: .      Complicating Factors: .    Plan / Procedure:    Treatment plan: prn.  Instructed patient: stretch as instructed at visit.  Short term goals: increase ROM.  Long term goals: restore normal function.  Prognosis: very good.

## 2017-11-21 ENCOUNTER — HOSPITAL ENCOUNTER (OUTPATIENT)
Dept: RESPIRATORY THERAPY | Facility: HOSPITAL | Age: 37
Discharge: HOME | End: 2017-11-21
Attending: FAMILY MEDICINE | Admitting: FAMILY MEDICINE
Payer: COMMERCIAL

## 2017-11-21 LAB
BASE DEFICIT BLDA-SCNC: 2.4 MMOL/L
HCO3 BLD-SCNC: 22 MMOL/L (ref 21–28)
O2/TOTAL GAS SETTING VFR VENT: NORMAL %
OXYHGB MFR BLD: 97 % (ref 92–100)
PCO2 BLD: 37 MM HG (ref 35–45)
PH BLD: 7.39 PH (ref 7.35–7.45)
PO2 BLD: 97 MM HG (ref 80–105)

## 2017-11-21 PROCEDURE — 94726 PLETHYSMOGRAPHY LUNG VOLUMES: CPT | Mod: 26 | Performed by: INTERNAL MEDICINE

## 2017-11-21 PROCEDURE — 82805 BLOOD GASES W/O2 SATURATION: CPT | Performed by: FAMILY MEDICINE

## 2017-11-21 PROCEDURE — 94726 PLETHYSMOGRAPHY LUNG VOLUMES: CPT

## 2017-11-21 PROCEDURE — 94729 DIFFUSING CAPACITY: CPT

## 2017-11-21 PROCEDURE — 36600 WITHDRAWAL OF ARTERIAL BLOOD: CPT

## 2017-11-21 PROCEDURE — 94729 DIFFUSING CAPACITY: CPT | Mod: 26 | Performed by: INTERNAL MEDICINE

## 2017-11-21 PROCEDURE — 94010 BREATHING CAPACITY TEST: CPT | Mod: 26 | Performed by: INTERNAL MEDICINE

## 2017-11-21 PROCEDURE — 94010 BREATHING CAPACITY TEST: CPT

## 2017-11-21 RX ORDER — ALBUTEROL SULFATE 0.83 MG/ML
2.5 SOLUTION RESPIRATORY (INHALATION)
Status: DISCONTINUED | OUTPATIENT
Start: 2017-11-21 | End: 2017-11-22 | Stop reason: HOSPADM

## 2017-11-21 RX ORDER — BUDESONIDE AND FORMOTEROL FUMARATE DIHYDRATE 160; 4.5 UG/1; UG/1
2 AEROSOL RESPIRATORY (INHALATION) 2 TIMES DAILY
COMMUNITY

## 2017-11-24 ENCOUNTER — ALLIED HEALTH/NURSE VISIT (OUTPATIENT)
Dept: ALLERGY | Facility: OTHER | Age: 37
End: 2017-11-24
Attending: PHYSICIAN ASSISTANT
Payer: COMMERCIAL

## 2017-11-24 DIAGNOSIS — J30.1 ALLERGIC RHINITIS DUE TO POLLEN, UNSPECIFIED CHRONICITY, UNSPECIFIED SEASONALITY: Primary | ICD-10-CM

## 2017-11-24 PROCEDURE — 95115 IMMUNOTHERAPY ONE INJECTION: CPT

## 2017-11-24 NOTE — MR AVS SNAPSHOT
After Visit Summary   11/24/2017    Sydni Isabel    MRN: [id number]           Patient Information     Date Of Birth          1980        Visit Information        Provider Department      11/24/2017 9:45 AM HC SHOT ROOM [facility]        Today's Diagnoses     Allergic rhinitis due to pollen, unspecified chronicity, unspecified seasonality    -  1       Follow-ups after your visit        Your next 10 appointments already scheduled     Nov 24, 2017  9:45 AM CST   injection with HC SHOT ROOM   [facility] [place] ([facility] [place] )    [address]  [place] [address]   [phone]            Dec 20, 2017   Procedure with Yulisa Quiroz MD   [facility] ([facility] )    [address] [place] [address]  [place] [address] [phone]            Dec 27, 2017 11:15 AM CST   (Arrive by 11:00 AM)   Post Op with Martina Dominguez PA-C   [facility] ([facility] )    [address]  [place] [address]   [phone]            Jan 11, 2018 10:30 AM CST   (Arrive by 10:15 AM)   Return Visit with Yulisa Quiroz MD   [facility] ([facility] )    [address]  [place] [address]   [phone]              Who to contact     If you have questions or need follow up information about today's clinic visit or your schedule please contact [facility] directly at [phone].  Normal or non-critical lab and imaging results will be communicated to you by MyChart, letter or phone within 4 business days after the clinic has received the results. If you do not hear from us within 7 days, please contact the clinic through MyChart or phone. If you have a critical or abnormal lab result, we will notify you by phone as soon as possible.  Submit refill requests through [employer] or call your pharmacy and they will forward the refill request to us. Please allow 3 business days for  your refill to be completed.          Additional Information About Your Visit        MyChart Information     [employer] gives you secure access to your electronic health record. If you see a primary care provider, you can also send messages to your care team and make appointments. If you have questions, please call your primary care clinic.  If you do not have a primary care provider, please call [phone] and they will assist you.        Care EveryWhere ID     This is your Care EveryWhere ID. This could be used by other organizations to access your [place] medical records  [id number]         Blood Pressure from Last 3 Encounters:   10/06/17 127/73   09/26/17 100/64   09/03/17 107/72    Weight from Last 3 Encounters:   09/26/17 170 lb (77.1 kg)   07/10/17 174 lb (78.9 kg)   04/28/17 179 lb (81.2 kg)              We Performed the Following     Allergy Shot: One injection        Primary Care Provider Office Phone # Fax #    Go Villarreal [phone]       [facility] [address] [facility] [address]        Equal Access to Services     : Hadii aad ku hadasho Sojoshuaali, waaxda luqadaha, qaybta kaalmada gabe, susanna maier . So [facility] [phone].    ATENCIÓN: Si habla español, tiene a suero disposición servicios gratuitos de asistencia lingüística. Letty al [phone].    We comply with applicable federal civil rights laws and [place] laws. We do not discriminate on the basis of race, color, national origin, age, disability, sex, sexual orientation, or gender identity.            Thank you!     Thank you for choosing [facility]  for your care. Our goal is always to provide you with excellent care. Hearing back from our patients is one way we can continue to improve our services. Please take a few minutes to complete the written survey that you may receive in the mail after your visit with us. Thank you!             Your Updated  Medication List - Protect others around you: Learn how to safely use, store and throw away your medicines at www.disposemymeds.org.          This list is accurate as of: 11/24/17  9:35 AM.  Always use your most recent med list.                   Brand Name Dispense Instructions for use Diagnosis    albuterol 108 (90 BASE) MCG/ACT Inhaler    PROAIR HFA    1 Inhaler    Inhale 1 puff into the lungs 3 times daily For 3-5 days.        ALLERGEN IMMUNOTHERAPY PRESCRIPTION     5 mL    Proceed with SCIT per standard buildup protocol.    Perennial allergic rhinitis       azelastine-fluticasone 137-50 MCG/ACT nasal spray    DYMISTA    2 Bottle    Spray 1 spray into both nostrils 2 times daily    Perennial allergic rhinitis with seasonal variation, Seasonal allergic rhinitis, unspecified allergic rhinitis trigger       budesonide-formoterol 160-4.5 MCG/ACT Inhaler    SYMBICORT     Inhale 2 puffs into the lungs 2 times daily        dexlansoprazole 60 MG Cpdr CR capsule    DEXILANT     Take 60 mg by mouth daily        EPINEPHrine 0.3 MG/0.3ML injection    EPIPEN    2 each    Inject 0.3 mLs into the muscle once as needed for anaphylaxis for 1 dose.    Chronic rhinitis       fluticasone 50 MCG/ACT spray    FLONASE    16 g    Spray 2 sprays into both nostrils daily    Chronic rhinitis       loratadine 10 MG tablet    CLARITIN    30 tablet    TAKE 1 TABLET DAILY.    Allergic rhinitis due to pollen, Seasonal allergic rhinitis, Perennial allergic rhinitis with seasonal variation       montelukast 10 MG tablet    SINGULAIR    90 tablet    TAKE ONE TABLET AT BEDTIME.    Perennial allergic rhinitis with seasonal variation, Seasonal allergic rhinitis, Allergic rhinitis due to pollen       multivitamin, therapeutic with minerals Tabs tablet      Take 1 tablet by mouth daily        vitamin D 52773 UNIT capsule    ERGOCALCIFEROL     Take 2,000 Units by mouth daily

## 2017-11-26 ENCOUNTER — HEALTH MAINTENANCE LETTER (OUTPATIENT)
Age: 37
End: 2017-11-26

## 2017-11-30 ENCOUNTER — TRANSFERRED RECORDS (OUTPATIENT)
Dept: HEALTH INFORMATION MANAGEMENT | Facility: HOSPITAL | Age: 37
End: 2017-11-30

## 2017-12-06 ENCOUNTER — ALLIED HEALTH/NURSE VISIT (OUTPATIENT)
Dept: ALLERGY | Facility: OTHER | Age: 37
End: 2017-12-06
Attending: PHYSICIAN ASSISTANT
Payer: COMMERCIAL

## 2017-12-06 ENCOUNTER — HOSPITAL ENCOUNTER (OUTPATIENT)
Dept: MRI IMAGING | Facility: HOSPITAL | Age: 37
Discharge: HOME | End: 2017-12-06
Attending: PSYCHIATRY & NEUROLOGY | Admitting: PSYCHIATRY & NEUROLOGY
Payer: COMMERCIAL

## 2017-12-06 DIAGNOSIS — R20.0 RT FACIAL NUMBNESS: ICD-10-CM

## 2017-12-06 DIAGNOSIS — R51.9 HEADACHE: ICD-10-CM

## 2017-12-06 DIAGNOSIS — J30.1 ALLERGIC RHINITIS DUE TO POLLEN: Primary | ICD-10-CM

## 2017-12-06 PROCEDURE — 25000128 H RX IP 250 OP 636: Performed by: RADIOLOGY

## 2017-12-06 PROCEDURE — 95115 IMMUNOTHERAPY ONE INJECTION: CPT

## 2017-12-06 PROCEDURE — A9585 GADOBUTROL INJECTION: HCPCS | Performed by: RADIOLOGY

## 2017-12-06 PROCEDURE — 70553 MRI BRAIN STEM W/O & W/DYE: CPT | Mod: TC

## 2017-12-06 RX ORDER — GADOBUTROL 604.72 MG/ML
7.5 INJECTION INTRAVENOUS ONCE
Status: COMPLETED | OUTPATIENT
Start: 2017-12-06 | End: 2017-12-06

## 2017-12-06 RX ADMIN — GADOBUTROL 7.5 ML: 604.72 INJECTION INTRAVENOUS at 09:08

## 2017-12-06 NOTE — PROGRESS NOTES
Prior to injection verified patient identity using patient's name and date of birth.  Allergy injection/s given and charted on paper allergy flow sheet.  Patient experienced no reaction.  Patient will return to continue injections.

## 2017-12-06 NOTE — MR AVS SNAPSHOT
After Visit Summary   12/6/2017    Sydni Isabel    MRN: [id number]           Patient Information     Date Of Birth          1980        Visit Information        Provider Department      12/6/2017 2:00 PM HC SHOT ROOM [facility]        Today's Diagnoses     Allergic rhinitis due to pollen    -  1       Follow-ups after your visit        Your next 10 appointments already scheduled     Dec 20, 2017   Procedure with Yulisa Quiroz MD   [facility] ([facility] )    [address] [place], [place] [address]  [facility] [address]   [phone]            Dec 27, 2017 11:15 AM CST   (Arrive by 11:00 AM)   Post Op with Martina Dominguez PA-C   [facility] ([facility] )    [address] [facility] [address]   [phone]            Jan 11, 2018 10:30 AM CST   (Arrive by 10:15 AM)   Return Visit with Yulisa Quiroz MD   [facility] ([facility] )    [address] [facility] [address]   [phone]              Who to contact     If you have questions or need follow up information about today's clinic visit or your schedule please contact [facility] directly at [phone].  Normal or non-critical lab and imaging results will be communicated to you by [employer], letter or phone within 4 business days after the clinic has received the results. If you do not hear from us within 7 days, please contact the clinic through MyChart or phone. If you have a critical or abnormal lab result, we will notify you by phone as soon as possible.  Submit refill requests through [employer] or call your pharmacy and they will forward the refill request to us. Please allow 3 business days for your refill to be completed.          Additional Information About Your Visit        [employer] Information     [employer] gives you secure access to your electronic health record. If you see a primary care provider, you can also send messages  to your care team and make appointments. If you have questions, please call your primary care clinic.  If you do not have a primary care provider, please call [phone] and they will assist you.        Care EveryWhere ID     This is your Care EveryWhere ID. This could be used by other organizations to access your [place] medical records  [id number]         Blood Pressure from Last 3 Encounters:   10/06/17 127/73   09/26/17 100/64   09/03/17 107/72    Weight from Last 3 Encounters:   09/26/17 170 lb (77.1 kg)   07/10/17 174 lb (78.9 kg)   04/28/17 179 lb (81.2 kg)              We Performed the Following     Allergy Shot: One injection        Primary Care Provider Office Phone # Fax #    Go Villarreal [phone]       [facility] [address]  [facility] [address]        Equal Access to Services     [facility] : Hadii aad ku hadasho Sojoshuaali, waaxda luqadaha, qaybta kaalmada adeegyada, waxay sanderin hayarlynn martah maier . So [facility] [phone].    ATENCIÓN: Si habla español, tiene a suero disposición servicios gratuitos de asistencia lingüística. Letty al [phone].    We comply with applicable federal civil rights laws and [place] laws. We do not discriminate on the basis of race, color, national origin, age, disability, sex, sexual orientation, or gender identity.            Thank you!     Thank you for choosing [facility]  for your care. Our goal is always to provide you with excellent care. Hearing back from our patients is one way we can continue to improve our services. Please take a few minutes to complete the written survey that you may receive in the mail after your visit with us. Thank you!             Your Updated Medication List - Protect others around you: Learn how to safely use, store and throw away your medicines at www.disposemymeds.org.          This list is accurate as of: 12/6/17  2:23 PM.  Always use your most recent med list.                    Brand Name Dispense Instructions for use Diagnosis    albuterol 108 (90 BASE) MCG/ACT Inhaler    PROAIR HFA    1 Inhaler    Inhale 1 puff into the lungs 3 times daily For 3-5 days.        ALLERGEN IMMUNOTHERAPY PRESCRIPTION     5 mL    Proceed with SCIT per standard buildup protocol.    Perennial allergic rhinitis       azelastine-fluticasone 137-50 MCG/ACT nasal spray    DYMISTA    2 Bottle    Spray 1 spray into both nostrils 2 times daily    Perennial allergic rhinitis with seasonal variation, Seasonal allergic rhinitis, unspecified allergic rhinitis trigger       budesonide-formoterol 160-4.5 MCG/ACT Inhaler    SYMBICORT     Inhale 2 puffs into the lungs 2 times daily        dexlansoprazole 60 MG Cpdr CR capsule    DEXILANT     Take 60 mg by mouth daily        EPINEPHrine 0.3 MG/0.3ML injection    EPIPEN    2 each    Inject 0.3 mLs into the muscle once as needed for anaphylaxis for 1 dose.    Chronic rhinitis       fluticasone 50 MCG/ACT spray    FLONASE    16 g    Spray 2 sprays into both nostrils daily    Chronic rhinitis       loratadine 10 MG tablet    CLARITIN    30 tablet    TAKE 1 TABLET DAILY.    Allergic rhinitis due to pollen, Seasonal allergic rhinitis, Perennial allergic rhinitis with seasonal variation       montelukast 10 MG tablet    SINGULAIR    90 tablet    TAKE ONE TABLET AT BEDTIME.    Perennial allergic rhinitis with seasonal variation, Seasonal allergic rhinitis, Allergic rhinitis due to pollen       multivitamin, therapeutic with minerals Tabs tablet      Take 1 tablet by mouth daily        vitamin D 07090 UNIT capsule    ERGOCALCIFEROL     Take 2,000 Units by mouth daily

## 2017-12-15 ENCOUNTER — ALLIED HEALTH/NURSE VISIT (OUTPATIENT)
Dept: ALLERGY | Facility: OTHER | Age: 37
End: 2017-12-15
Attending: PHYSICIAN ASSISTANT
Payer: COMMERCIAL

## 2017-12-15 DIAGNOSIS — J30.1 ALLERGIC RHINITIS DUE TO POLLEN: Primary | ICD-10-CM

## 2017-12-15 PROCEDURE — 95115 IMMUNOTHERAPY ONE INJECTION: CPT

## 2017-12-15 NOTE — PROGRESS NOTES
Prior to injection verified patient identity using patient's name and date of birth.    Allergy injection/s given and charted on paper allergy flow sheet.  Patient signed out AMA without waiting the recommended 30 minutes.  BRAIN BOLANOS

## 2017-12-15 NOTE — MR AVS SNAPSHOT
After Visit Summary   12/15/2017    Sydni Isabel    MRN: [id number]           Patient Information     Date Of Birth          1980        Visit Information        Provider Department      12/15/2017 9:30 AM HC SHOT ROOM [facility]        Today's Diagnoses     Allergic rhinitis due to pollen    -  1       Follow-ups after your visit        Your next 10 appointments already scheduled     Dec 15, 2017  9:30 AM CST   injection with HC SHOT ROOM   [facility] ([facility] [place] )    [address]  [place] [address]   [phone]            Dec 20, 2017   Procedure with Yulisa Quiroz MD   [facility] ([facility] )    [address]  [place] [address] [phone]            Dec 27, 2017 11:15 AM CST   (Arrive by 11:00 AM)   Post Op with Martina Dominguez PA-C   [facility] ([facility] [place] )    [address]  [place] [address]   [phone]            Jan 11, 2018 10:30 AM CST   (Arrive by 10:15 AM)   Return Visit with Yulisa Quiroz MD   [facility] ([facility] [place] )    [address]  [place] [address]   [phone]              Who to contact     If you have questions or need follow up information about today's clinic visit or your schedule please contact [facility] directly at [phone].  Normal or non-critical lab and imaging results will be communicated to you by MyChart, letter or phone within 4 business days after the clinic has received the results. If you do not hear from us within 7 days, please contact the clinic through MyChart or phone. If you have a critical or abnormal lab result, we will notify you by phone as soon as possible.  Submit refill requests through [employer] or call your pharmacy and they will forward the refill request to us. Please allow 3 business days for your refill to be completed.          Additional  Information About Your Visit        [employer] Information     [employer] gives you secure access to your electronic health record. If you see a primary care provider, you can also send messages to your care team and make appointments. If you have questions, please call your primary care clinic.  If you do not have a primary care provider, please call [phone] and they will assist you.        Care EveryWhere ID     This is your Care EveryWhere ID. This could be used by other organizations to access your [place] medical records  [id number]         Blood Pressure from Last 3 Encounters:   10/06/17 127/73   09/26/17 100/64   09/03/17 107/72    Weight from Last 3 Encounters:   09/26/17 170 lb (77.1 kg)   07/10/17 174 lb (78.9 kg)   04/28/17 179 lb (81.2 kg)              We Performed the Following     Allergy Shot: One injection        Primary Care Provider Office Phone # Fax #    Go Villarreal [phone]       [facility] [address] [facility] [address]        Equal Access to Services     [facility]: Hadii aad ku hadasho Soomaali, waaxda luqadaha, qaybta kaalmada adeegyada, waxloulou maier . So [facility] [phone].    ATENCIÓN: Si habla español, tiene a suero disposición servicios gratuitos de asistencia lingüística. Llame al [phone].    We comply with applicable federal civil rights laws and [place] laws. We do not discriminate on the basis of race, color, national origin, age, disability, sex, sexual orientation, or gender identity.            Thank you!     Thank you for choosing [facility]  for your care. Our goal is always to provide you with excellent care. Hearing back from our patients is one way we can continue to improve our services. Please take a few minutes to complete the written survey that you may receive in the mail after your visit with us. Thank you!             Your Updated Medication List - Protect others around you: Learn  how to safely use, store and throw away your medicines at www.disposemymeds.org.          This list is accurate as of: 12/15/17  9:11 AM.  Always use your most recent med list.                   Brand Name Dispense Instructions for use Diagnosis    albuterol 108 (90 BASE) MCG/ACT Inhaler    PROAIR HFA    1 Inhaler    Inhale 1 puff into the lungs 3 times daily For 3-5 days.        ALLERGEN IMMUNOTHERAPY PRESCRIPTION     5 mL    Proceed with SCIT per standard buildup protocol.    Perennial allergic rhinitis       azelastine-fluticasone 137-50 MCG/ACT nasal spray    DYMISTA    2 Bottle    Spray 1 spray into both nostrils 2 times daily    Perennial allergic rhinitis with seasonal variation, Seasonal allergic rhinitis, unspecified allergic rhinitis trigger       budesonide-formoterol 160-4.5 MCG/ACT Inhaler    SYMBICORT     Inhale 2 puffs into the lungs 2 times daily        dexlansoprazole 60 MG Cpdr CR capsule    DEXILANT     Take 60 mg by mouth daily        EPINEPHrine 0.3 MG/0.3ML injection    EPIPEN    2 each    Inject 0.3 mLs into the muscle once as needed for anaphylaxis for 1 dose.    Chronic rhinitis       fluticasone 50 MCG/ACT spray    FLONASE    16 g    Spray 2 sprays into both nostrils daily    Chronic rhinitis       loratadine 10 MG tablet    CLARITIN    30 tablet    TAKE 1 TABLET DAILY.    Allergic rhinitis due to pollen, Seasonal allergic rhinitis, Perennial allergic rhinitis with seasonal variation       montelukast 10 MG tablet    SINGULAIR    90 tablet    TAKE ONE TABLET AT BEDTIME.    Perennial allergic rhinitis with seasonal variation, Seasonal allergic rhinitis, Allergic rhinitis due to pollen       multivitamin, therapeutic with minerals Tabs tablet      Take 1 tablet by mouth daily        vitamin D 04825 UNIT capsule    ERGOCALCIFEROL     Take 2,000 Units by mouth daily

## 2017-12-20 ENCOUNTER — APPOINTMENT (OUTPATIENT)
Dept: LAB | Facility: HOSPITAL | Age: 37
End: 2017-12-20
Attending: OTOLARYNGOLOGY
Payer: COMMERCIAL

## 2017-12-20 ENCOUNTER — ANESTHESIA EVENT (OUTPATIENT)
Dept: SURGERY | Facility: HOSPITAL | Age: 37
End: 2017-12-20
Payer: COMMERCIAL

## 2017-12-20 ENCOUNTER — HOSPITAL ENCOUNTER (OUTPATIENT)
Facility: HOSPITAL | Age: 37
Discharge: HOME | End: 2017-12-20
Attending: OTOLARYNGOLOGY | Admitting: OTOLARYNGOLOGY
Payer: COMMERCIAL

## 2017-12-20 ENCOUNTER — SURGERY (OUTPATIENT)
Age: 37
End: 2017-12-20

## 2017-12-20 ENCOUNTER — ANESTHESIA (OUTPATIENT)
Dept: SURGERY | Facility: HOSPITAL | Age: 37
End: 2017-12-20
Payer: COMMERCIAL

## 2017-12-20 VITALS
TEMPERATURE: 97.4 F | SYSTOLIC BLOOD PRESSURE: 115 MMHG | WEIGHT: 174 LBS | BODY MASS INDEX: 28.99 KG/M2 | HEART RATE: 79 BPM | OXYGEN SATURATION: 94 % | DIASTOLIC BLOOD PRESSURE: 77 MMHG | HEIGHT: 65 IN | RESPIRATION RATE: 16 BRPM

## 2017-12-20 DIAGNOSIS — Z98.890 POST-OPERATIVE STATE: Primary | ICD-10-CM

## 2017-12-20 LAB — HCG UR QL: NEGATIVE

## 2017-12-20 PROCEDURE — 25000125 ZZHC RX 250: Performed by: NURSE ANESTHETIST, CERTIFIED REGISTERED

## 2017-12-20 PROCEDURE — 25000128 H RX IP 250 OP 636: Performed by: NURSE ANESTHETIST, CERTIFIED REGISTERED

## 2017-12-20 PROCEDURE — 01999 UNLISTED ANES PROCEDURE: CPT | Performed by: NURSE ANESTHETIST, CERTIFIED REGISTERED

## 2017-12-20 PROCEDURE — 30930 THER FX NASAL INF TURBINATE: CPT | Mod: 50 | Performed by: OTOLARYNGOLOGY

## 2017-12-20 PROCEDURE — 71000014 ZZH RECOVERY PHASE 1 LEVEL 2 FIRST HR: Performed by: OTOLARYNGOLOGY

## 2017-12-20 PROCEDURE — 25000566 ZZH SEVOFLURANE, EA 15 MIN: Performed by: ANESTHESIOLOGY

## 2017-12-20 PROCEDURE — 81025 URINE PREGNANCY TEST: CPT | Performed by: ANESTHESIOLOGY

## 2017-12-20 PROCEDURE — C1726 CATH, BAL DIL, NON-VASCULAR: HCPCS | Performed by: OTOLARYNGOLOGY

## 2017-12-20 PROCEDURE — 30465 REPAIR NASAL STENOSIS: CPT | Performed by: OTOLARYNGOLOGY

## 2017-12-20 PROCEDURE — 36000058 ZZH SURGERY LEVEL 3 EA 15 ADDTL MIN: Performed by: OTOLARYNGOLOGY

## 2017-12-20 PROCEDURE — 71000027 ZZH RECOVERY PHASE 2 EACH 15 MINS: Performed by: OTOLARYNGOLOGY

## 2017-12-20 PROCEDURE — 25000125 ZZHC RX 250: Performed by: ANESTHESIOLOGY

## 2017-12-20 PROCEDURE — 27110028 ZZH OR GENERAL SUPPLY NON-STERILE: Performed by: OTOLARYNGOLOGY

## 2017-12-20 PROCEDURE — 27210995 ZZH RX 272: Performed by: OTOLARYNGOLOGY

## 2017-12-20 PROCEDURE — 40000305 ZZH STATISTIC PRE PROC ASSESS I: Performed by: OTOLARYNGOLOGY

## 2017-12-20 PROCEDURE — 61782 SCAN PROC CRANIAL EXTRA: CPT | Performed by: OTOLARYNGOLOGY

## 2017-12-20 PROCEDURE — 25000128 H RX IP 250 OP 636: Performed by: OTOLARYNGOLOGY

## 2017-12-20 PROCEDURE — 25000128 H RX IP 250 OP 636: Performed by: ANESTHESIOLOGY

## 2017-12-20 PROCEDURE — 25000132 ZZH RX MED GY IP 250 OP 250 PS 637: Performed by: ANESTHESIOLOGY

## 2017-12-20 PROCEDURE — 31276 NSL/SINS NDSC FRNT TISS RMVL: CPT | Mod: 50 | Performed by: OTOLARYNGOLOGY

## 2017-12-20 PROCEDURE — 31255 NSL/SINS NDSC W/TOT ETHMDCT: CPT | Mod: 50 | Performed by: OTOLARYNGOLOGY

## 2017-12-20 PROCEDURE — 37000009 ZZH ANESTHESIA TECHNICAL FEE, EACH ADDTL 15 MIN: Performed by: OTOLARYNGOLOGY

## 2017-12-20 PROCEDURE — 37000008 ZZH ANESTHESIA TECHNICAL FEE, 1ST 30 MIN: Performed by: OTOLARYNGOLOGY

## 2017-12-20 PROCEDURE — 27210794 ZZH OR GENERAL SUPPLY STERILE: Performed by: OTOLARYNGOLOGY

## 2017-12-20 PROCEDURE — 20912 REMOVE CARTILAGE FOR GRAFT: CPT | Performed by: OTOLARYNGOLOGY

## 2017-12-20 PROCEDURE — 36000056 ZZH SURGERY LEVEL 3 1ST 30 MIN: Performed by: OTOLARYNGOLOGY

## 2017-12-20 PROCEDURE — 31267 ENDOSCOPY MAXILLARY SINUS: CPT | Mod: 50 | Performed by: OTOLARYNGOLOGY

## 2017-12-20 PROCEDURE — 31255 NSL/SINS NDSC W/TOT ETHMDCT: CPT | Performed by: ANESTHESIOLOGY

## 2017-12-20 PROCEDURE — 71000015 ZZH RECOVERY PHASE 1 LEVEL 2 EA ADDTL HR: Performed by: OTOLARYNGOLOGY

## 2017-12-20 PROCEDURE — 25000125 ZZHC RX 250: Performed by: OTOLARYNGOLOGY

## 2017-12-20 PROCEDURE — 88304 TISSUE EXAM BY PATHOLOGIST: CPT | Mod: TC | Performed by: OTOLARYNGOLOGY

## 2017-12-20 PROCEDURE — 30520 REPAIR OF NASAL SEPTUM: CPT | Performed by: OTOLARYNGOLOGY

## 2017-12-20 RX ORDER — DEXAMETHASONE SODIUM PHOSPHATE 4 MG/ML
4 INJECTION, SOLUTION INTRA-ARTICULAR; INTRALESIONAL; INTRAMUSCULAR; INTRAVENOUS; SOFT TISSUE EVERY 10 MIN PRN
Status: DISCONTINUED | OUTPATIENT
Start: 2017-12-20 | End: 2017-12-20 | Stop reason: HOSPADM

## 2017-12-20 RX ORDER — LIDOCAINE HYDROCHLORIDE 20 MG/ML
INJECTION, SOLUTION INFILTRATION; PERINEURAL PRN
Status: DISCONTINUED | OUTPATIENT
Start: 2017-12-20 | End: 2017-12-20

## 2017-12-20 RX ORDER — BUDESONIDE 0.5 MG/2ML
INHALANT ORAL
Qty: 3 BOX | Refills: 11 | Status: SHIPPED | OUTPATIENT
Start: 2017-12-20 | End: 2021-02-19

## 2017-12-20 RX ORDER — LIDOCAINE HYDROCHLORIDE AND EPINEPHRINE 10; 10 MG/ML; UG/ML
INJECTION, SOLUTION INFILTRATION; PERINEURAL PRN
Status: DISCONTINUED | OUTPATIENT
Start: 2017-12-20 | End: 2017-12-20 | Stop reason: HOSPADM

## 2017-12-20 RX ORDER — KETOROLAC TROMETHAMINE 30 MG/ML
30 INJECTION, SOLUTION INTRAMUSCULAR; INTRAVENOUS EVERY 6 HOURS PRN
Status: DISCONTINUED | OUTPATIENT
Start: 2017-12-20 | End: 2017-12-20 | Stop reason: HOSPADM

## 2017-12-20 RX ORDER — NALOXONE HYDROCHLORIDE 0.4 MG/ML
.1-.4 INJECTION, SOLUTION INTRAMUSCULAR; INTRAVENOUS; SUBCUTANEOUS
Status: DISCONTINUED | OUTPATIENT
Start: 2017-12-20 | End: 2017-12-20 | Stop reason: HOSPADM

## 2017-12-20 RX ORDER — SOD CHLOR,BICARB/SQUEEZ BOTTLE
PACKET, WITH RINSE DEVICE NASAL
Qty: 1 EACH | Status: SHIPPED | OUTPATIENT
Start: 2017-12-20 | End: 2021-02-19

## 2017-12-20 RX ORDER — FENTANYL CITRATE 50 UG/ML
INJECTION, SOLUTION INTRAMUSCULAR; INTRAVENOUS PRN
Status: DISCONTINUED | OUTPATIENT
Start: 2017-12-20 | End: 2017-12-20

## 2017-12-20 RX ORDER — DEXAMETHASONE SODIUM PHOSPHATE 10 MG/ML
INJECTION, SOLUTION INTRAMUSCULAR; INTRAVENOUS PRN
Status: DISCONTINUED | OUTPATIENT
Start: 2017-12-20 | End: 2017-12-20

## 2017-12-20 RX ORDER — PROMETHAZINE HYDROCHLORIDE 25 MG/ML
12.5 INJECTION, SOLUTION INTRAMUSCULAR; INTRAVENOUS
Status: DISCONTINUED | OUTPATIENT
Start: 2017-12-20 | End: 2017-12-20 | Stop reason: HOSPADM

## 2017-12-20 RX ORDER — HYDROCODONE BITARTRATE AND ACETAMINOPHEN 7.5; 325 MG/1; MG/1
1 TABLET ORAL EVERY 6 HOURS PRN
Qty: 15 TABLET | Refills: 0 | Status: SHIPPED | OUTPATIENT
Start: 2017-12-20 | End: 2017-12-25

## 2017-12-20 RX ORDER — SCOLOPAMINE TRANSDERMAL SYSTEM 1 MG/1
1 PATCH, EXTENDED RELEASE TRANSDERMAL ONCE
Status: COMPLETED | OUTPATIENT
Start: 2017-12-20 | End: 2017-12-20

## 2017-12-20 RX ORDER — LABETALOL HYDROCHLORIDE 5 MG/ML
10 INJECTION, SOLUTION INTRAVENOUS
Status: DISCONTINUED | OUTPATIENT
Start: 2017-12-20 | End: 2017-12-20 | Stop reason: HOSPADM

## 2017-12-20 RX ORDER — HYDROMORPHONE HYDROCHLORIDE 1 MG/ML
.3-.5 INJECTION, SOLUTION INTRAMUSCULAR; INTRAVENOUS; SUBCUTANEOUS EVERY 10 MIN PRN
Status: DISCONTINUED | OUTPATIENT
Start: 2017-12-20 | End: 2017-12-20 | Stop reason: HOSPADM

## 2017-12-20 RX ORDER — MEPERIDINE HYDROCHLORIDE 25 MG/ML
12.5 INJECTION INTRAMUSCULAR; INTRAVENOUS; SUBCUTANEOUS
Status: DISCONTINUED | OUTPATIENT
Start: 2017-12-20 | End: 2017-12-20 | Stop reason: HOSPADM

## 2017-12-20 RX ORDER — SODIUM CHLORIDE, SODIUM LACTATE, POTASSIUM CHLORIDE, CALCIUM CHLORIDE 600; 310; 30; 20 MG/100ML; MG/100ML; MG/100ML; MG/100ML
INJECTION, SOLUTION INTRAVENOUS CONTINUOUS
Status: DISCONTINUED | OUTPATIENT
Start: 2017-12-20 | End: 2017-12-20 | Stop reason: HOSPADM

## 2017-12-20 RX ORDER — FENTANYL CITRATE 50 UG/ML
25-50 INJECTION, SOLUTION INTRAMUSCULAR; INTRAVENOUS
Status: DISCONTINUED | OUTPATIENT
Start: 2017-12-20 | End: 2017-12-20 | Stop reason: HOSPADM

## 2017-12-20 RX ORDER — ONDANSETRON 4 MG/1
4 TABLET, ORALLY DISINTEGRATING ORAL EVERY 30 MIN PRN
Status: DISCONTINUED | OUTPATIENT
Start: 2017-12-20 | End: 2017-12-20 | Stop reason: HOSPADM

## 2017-12-20 RX ORDER — OXYCODONE HYDROCHLORIDE 5 MG/1
5 TABLET ORAL EVERY 4 HOURS PRN
Status: DISCONTINUED | OUTPATIENT
Start: 2017-12-20 | End: 2017-12-20 | Stop reason: HOSPADM

## 2017-12-20 RX ORDER — CEFDINIR 300 MG/1
600 CAPSULE ORAL DAILY
Qty: 14 CAPSULE | Refills: 0 | Status: SHIPPED | OUTPATIENT
Start: 2017-12-20 | End: 2017-12-27

## 2017-12-20 RX ORDER — HYDRALAZINE HYDROCHLORIDE 20 MG/ML
2.5-5 INJECTION INTRAMUSCULAR; INTRAVENOUS EVERY 10 MIN PRN
Status: DISCONTINUED | OUTPATIENT
Start: 2017-12-20 | End: 2017-12-20 | Stop reason: HOSPADM

## 2017-12-20 RX ORDER — ONDANSETRON 2 MG/ML
INJECTION INTRAMUSCULAR; INTRAVENOUS PRN
Status: DISCONTINUED | OUTPATIENT
Start: 2017-12-20 | End: 2017-12-20

## 2017-12-20 RX ORDER — OXYMETAZOLINE HYDROCHLORIDE 0.05 G/100ML
3 SPRAY NASAL
Status: COMPLETED | OUTPATIENT
Start: 2017-12-20 | End: 2017-12-20

## 2017-12-20 RX ORDER — PROPOFOL 10 MG/ML
INJECTION, EMULSION INTRAVENOUS PRN
Status: DISCONTINUED | OUTPATIENT
Start: 2017-12-20 | End: 2017-12-20

## 2017-12-20 RX ORDER — ONDANSETRON 2 MG/ML
4 INJECTION INTRAMUSCULAR; INTRAVENOUS EVERY 30 MIN PRN
Status: DISCONTINUED | OUTPATIENT
Start: 2017-12-20 | End: 2017-12-20 | Stop reason: HOSPADM

## 2017-12-20 RX ORDER — ALBUTEROL SULFATE 0.83 MG/ML
2.5 SOLUTION RESPIRATORY (INHALATION) EVERY 4 HOURS PRN
Status: DISCONTINUED | OUTPATIENT
Start: 2017-12-20 | End: 2017-12-20 | Stop reason: HOSPADM

## 2017-12-20 RX ADMIN — ROCURONIUM BROMIDE 10 MG: 10 INJECTION INTRAVENOUS at 11:25

## 2017-12-20 RX ADMIN — LIDOCAINE HYDROCHLORIDE,EPINEPHRINE BITARTRATE 20 ML: 10; .01 INJECTION, SOLUTION INFILTRATION; PERINEURAL at 13:01

## 2017-12-20 RX ADMIN — LIDOCAINE HYDROCHLORIDE 40 MG: 20 INJECTION, SOLUTION INFILTRATION; PERINEURAL at 11:25

## 2017-12-20 RX ADMIN — COCAINE HYDROCHLORIDE 4 ML: 40 SOLUTION TOPICAL at 13:01

## 2017-12-20 RX ADMIN — SCOPALAMINE 1 PATCH: 1 PATCH, EXTENDED RELEASE TRANSDERMAL at 09:53

## 2017-12-20 RX ADMIN — OXYCODONE HYDROCHLORIDE 5 MG: 5 TABLET ORAL at 15:45

## 2017-12-20 RX ADMIN — SODIUM CHLORIDE, POTASSIUM CHLORIDE, SODIUM LACTATE AND CALCIUM CHLORIDE 1000 ML: 600; 310; 30; 20 INJECTION, SOLUTION INTRAVENOUS at 10:09

## 2017-12-20 RX ADMIN — OXYMETAZOLINE HYDROCHLORIDE 3 SPRAY: 5 SPRAY NASAL at 10:13

## 2017-12-20 RX ADMIN — MIDAZOLAM 2 MG: 1 INJECTION INTRAMUSCULAR; INTRAVENOUS at 11:14

## 2017-12-20 RX ADMIN — PROPOFOL 200 MG: 10 INJECTION, EMULSION INTRAVENOUS at 11:25

## 2017-12-20 RX ADMIN — OXYMETAZOLINE HYDROCHLORIDE 3 SPRAY: 5 SPRAY NASAL at 09:52

## 2017-12-20 RX ADMIN — FENTANYL CITRATE 50 MCG: 50 INJECTION INTRAMUSCULAR; INTRAVENOUS at 14:07

## 2017-12-20 RX ADMIN — OXYMETAZOLINE HYDROCHLORIDE 3 SPRAY: 5 SPRAY NASAL at 10:03

## 2017-12-20 RX ADMIN — Medication 100 MG: at 11:25

## 2017-12-20 RX ADMIN — CEFAZOLIN 1 G: 1 INJECTION, POWDER, FOR SOLUTION INTRAMUSCULAR; INTRAVENOUS at 11:57

## 2017-12-20 RX ADMIN — SODIUM CHLORIDE, POTASSIUM CHLORIDE, SODIUM LACTATE AND CALCIUM CHLORIDE: 600; 310; 30; 20 INJECTION, SOLUTION INTRAVENOUS at 12:15

## 2017-12-20 RX ADMIN — FENTANYL CITRATE 100 MCG: 50 INJECTION, SOLUTION INTRAMUSCULAR; INTRAVENOUS at 11:42

## 2017-12-20 RX ADMIN — ONDANSETRON 4 MG: 2 INJECTION INTRAMUSCULAR; INTRAVENOUS at 13:09

## 2017-12-20 RX ADMIN — FENTANYL CITRATE 100 MCG: 50 INJECTION, SOLUTION INTRAMUSCULAR; INTRAVENOUS at 11:14

## 2017-12-20 RX ADMIN — DEXAMETHASONE SODIUM PHOSPHATE 12 MG: 10 INJECTION, SOLUTION INTRAMUSCULAR; INTRAVENOUS at 11:25

## 2017-12-20 NOTE — ANESTHESIA PREPROCEDURE EVALUATION
Anesthesia Evaluation     . Pt has had prior anesthetic.     No history of anesthetic complications          ROS/MED HX    ENT/Pulmonary:     (+)allergic rhinitis, other ENT- Chronic maxillary sinusitis, asthma , . .    Neurologic:     (+)other neuro Vertigo    Cardiovascular:  - neg cardiovascular ROS       METS/Exercise Tolerance:     Hematologic:  - neg hematologic  ROS       Musculoskeletal:   (+) , , other musculoskeletal- Cervicalgia      GI/Hepatic:     (+) GERD Other GI/Hepatic IBS      Renal/Genitourinary:  - ROS Renal section negative       Endo:  - neg endo ROS       Psychiatric:     (+) psychiatric history depression, anxiety and other (comment) (h/o Eating d/o)      Infectious Disease:  - neg infectious disease ROS       Malignancy:      - no malignancy   Other:    (+) No chance of pregnancy   - neg other ROS                 Physical Exam      Airway   Mallampati: II  TM distance: >3 FB  Neck ROM: full  Comment: Retrognathic mandible     Dental   (+) missing    Cardiovascular   Rhythm and rate: regular and normal      Pulmonary    breath sounds clear to auscultation                    Anesthesia Plan      History & Physical Review  History and physical reviewed and following examination; no interval change.    ASA Status:  2 .    NPO Status:  > 8 hours    Plan for General and ETT with Intravenous and Propofol induction. Maintenance will be Balanced.    PONV prophylaxis:  Ondansetron (or other 5HT-3), Scopolamine patch and Dexamethasone or Solumedrol  HCG Negative      Postoperative Care  Postoperative pain management:  IV analgesics and Oral pain medications.      Consents  Anesthetic plan, risks, benefits and alternatives discussed with:  Patient..                          .

## 2017-12-20 NOTE — OR NURSING
Patient and responsible adult given discharge instructions with no questions regarding instructions. Josiah score 19/20. Pain level 4/10.  Discharged from unit via wheelchair. Patient discharged to home.

## 2017-12-20 NOTE — ANESTHESIA CARE TRANSFER NOTE
Patient: Sydni Isabel    Procedure(s):  ENDOSCOPIC SINUS SURGERY, SEPTOPLASTY, TURBINATE REDUCTION, REPAIR LEFT NASAL VALVE - Wound Class: II-Clean Contaminated   - Wound Class: II-Clean Contaminated    Diagnosis: CHRONIC PANSINUSITIS, DEVIATED NASAL SEPTUM, NASAL TURBINATE HYPERTROPHY, NASAL VALVE COLLAPSE, FACIAL PAIN  Diagnosis Additional Information: No value filed.    Anesthesia Type:   General, ETT     Note:  Airway :Nasal Cannula  Patient transferred to:PACU  Handoff Report: Identifed the Patient, Identified the Reponsible Provider, Reviewed the pertinent medical history, Discussed the surgical course, Reviewed Intra-OP anesthesia mangement and issues during anesthesia, Set expectations for post-procedure period and Allowed opportunity for questions and acknowledgement of understanding      Vitals: (Last set prior to Anesthesia Care Transfer)    CRNA VITALS  12/20/2017 1254 - 12/20/2017 1333      12/20/2017             Resp Rate (set): 8                Electronically Signed By: CASSANDRA Bermudez CRNA  December 20, 2017  1:33 PM

## 2017-12-20 NOTE — IP AVS SNAPSHOT
[facility]    [address] [address]    Phone:  [phone]                                       After Visit Summary   12/20/2017    Sydni Isabel    MRN: [id number]           After Visit Summary Signature Page     I have received my discharge instructions, and my questions have been answered. I have discussed any challenges I see with this plan with the nurse or doctor.    ..........................................................................................................................................  Patient/Patient Representative Signature      ..........................................................................................................................................  Patient Representative Print Name and Relationship to Patient    ..................................................               ................................................  Date                                            Time    ..........................................................................................................................................  Reviewed by Signature/Title    ...................................................              ..............................................  Date                                                            Time

## 2017-12-20 NOTE — DISCHARGE INSTRUCTIONS
Post-Anesthesia Patient Instructions    IMMEDIATELY FOLLOWING SURGERY:  Do not drive or operate machinery for the first twenty four hours after surgery.  Do not make any important decisions for twenty four hours after surgery or while taking narcotic pain medications or sedatives.  If you develop intractable nausea and vomiting or a severe headache please notify your doctor immediately.    FOLLOW-UP:  Please make an appointment with your surgeon as instructed. You do not need to follow up with anesthesia unless specifically instructed to do so.    WOUND CARE INSTRUCTIONS (if applicable):  Keep a dry clean dressing on the anesthesia/puncture wound site if there is drainage.  Once the wound has quit draining you may leave it open to air.  Generally you should leave the bandage intact for twenty four hours unless there is drainage.  If the epidural site drains for more than 36-48 hours please call the anesthesia department.    QUESTIONS?:  Please feel free to call your physician or the hospital  if you have any questions, and they will be happy to assist you.      SCOPOLAMINE PATCH:  Remove the scopolamine patch behind your left ear after 24 hours after application.   After removing the patch, wash your hands and the area behind your ear thoroughly with soap and water.   The patch will still contain some medicine after use.   To avoid accidental contact or ingestion by children or pets, fold the used patch in half with the sticky side together and throw away in the trash out of the reach of children and pets.        Instructions for Sinus Surgery    Recovery - Everyone recovers differently from a general anesthetic.  Symptoms such as fatigue, nausea, light-headedness, and sometimes a low grade fever (up to 100 degrees) are not unusual.  As your body removes the anesthetic drugs from circulation, these symptoms will resolve.  Your nose will be sore after surgery, and you may even have symptoms similar to a  sinus infection with headache, congestion, and pressure.  These will resolve with healing.  For several days you may experience bloody drainage from the nose, please use the drip pad as necessary for this.  If there is persistent bleeding, please call the office during business hours or the on call ENT physician after hours.  There are no diet restrictions after sinus surgery, and you can resume your home medications.      Please do not blow your nose until two weeks after surgery.  At 2 weeks you may gently blow your nose, unless otherwise indicated by Dr. Quiroz.     Limit your activity to no strenuous activities until I see you for the first follow-up visit in approximately 2 weeks.      Medications - You were sent home with narcotic pain medication.  If you can tolerate the discomfort during your recovery by using just plain Tylenol or ibuprofen (advil), please do so.  However, do not hesitate to use the stronger pain medication if needed.  If you were sent home with an antibiotic, it is primarily used to help the healing process.  If it causes loose bowel movements or other signs of intolerance, it is appropriate to discontinue it.  By far the most important measure you can take to speed recovery, and maximize the chances of long term success of sinus surgery is using the sinus rinses at least three time per day for the first month after surgery.       Start Rosalba Med saline irrigation tonight and use at least 5 times daily.     1.  You will need to purchase 2 rosalba med bottles.  Make each bottle the same way, using warm distilled water filled up to the 240 ml lana, dissolve 2 packets of salt and mix into each bottle    2.  In one bottle, add the budesonide (steroid), shake gently to dissolve, and irrigate each nostril using the entire bottle divided between nostrils.  Each day you will use two entire bottle of the budesonide solution and will remake this the next day.     3.  In the other rosalba med bottle  use only the saline solution, and irrigate with this at least 3 additional times daily.    Perform gentle irrigation for the first week.  Starting 1 week after surgery, you should increase the volume of rosalba med saline irrigation to each nostril, continuing to use the rinses in an alternating fashion at least 5 times daily.  You cannot use too much of the rosalba med saline, but limit budesonide rinses to twice daily.    At 2 weeks after surgery, you may also restart nasal steroids (flonase, nasonex, etc).        Complications - Problems related to sinus surgery almost always are detected during the operation, and special instruction will be given in that situation.  However, unexpected things can happen, and are all related to the structures around the sinus cavities.  Symptoms that should alert you to a possible problem include: severe eye pain or eye swelling, persistent heavy bleeding from the nose, and high fevers with headache and neck pain.  Any of these symptoms should be called into my office or to the on call ENT if after hours.  The most common non-emergency complication of sinus surgery is the formation of scar tissue which can re-block the sinuses.  This is addressed below.    Follow-up -  As you have noted, there are quite a few follow-up visits after sinus surgery.  This is done to aggressively manage the most common complication of this technique, which is scar tissue blocking the sinuses.  These visits will require the examination of your nose and possibly removal of crusts of dry mucous and blood, with possible removal of early scar tissue.  Please prepare for these visits by using your sinus rinses.    If there are any questions or issues with the above, or if there are other issues that concern you, always feel free to call the clinic and I am happy to speak with you as soon as I can.    Yulisa Quiroz D.O.  Otolaryngology/Head and Neck Surgery  Allergy    [phone] office

## 2017-12-20 NOTE — IP AVS SNAPSHOT
MRN:[id number]                      After Visit Summary   12/20/2017    Sydni Isabel    MRN: [id number]           Thank you!     Thank you for choosing [facility] for your care. Our goal is always to provide you with excellent care. Hearing back from our patients is one way we can continue to improve our services. Please take a few minutes to complete the written survey that you may receive in the mail after you visit with us. Thank you!        Patient Information     Date Of Birth          1980        About your hospital stay     You were admitted on:  December 20, 2017 You last received care in the:  [facility] Preop/Phase II    You were discharged on:  December 20, 2017       Who to Call     For medical emergencies, please call 911.  For non-urgent questions about your medical care, please call your primary care provider or clinic, [phone]  For questions related to your surgery, please call your surgery clinic        Attending Provider     Provider Specialty    Yulisa Quiroz MD Otolaryngology       Primary Care Provider Office Phone # Fax #    Go Villarreal [phone]      Your next 10 appointments already scheduled     Dec 27, 2017 11:15 AM CST   (Arrive by 11:00 AM)   Post Op with Martina Dominguez PA-C   [facility] [place] ([facility] [place] )    [address]  [place] [address]   [phone]            Jan 11, 2018 10:30 AM CST   (Arrive by 10:15 AM)   Return Visit with Yulisa Quiroz MD   [facility] ([facility] [place] )    [address]  [place] [address]   [phone]              Further instructions from your care team             Post-Anesthesia Patient Instructions    IMMEDIATELY FOLLOWING SURGERY:  Do not drive or operate machinery for the first twenty four hours after surgery.  Do not make any important decisions for twenty four hours after surgery or while taking narcotic pain medications or  sedatives.  If you develop intractable nausea and vomiting or a severe headache please notify your doctor immediately.    FOLLOW-UP:  Please make an appointment with your surgeon as instructed. You do not need to follow up with anesthesia unless specifically instructed to do so.    WOUND CARE INSTRUCTIONS (if applicable):  Keep a dry clean dressing on the anesthesia/puncture wound site if there is drainage.  Once the wound has quit draining you may leave it open to air.  Generally you should leave the bandage intact for twenty four hours unless there is drainage.  If the epidural site drains for more than 36-48 hours please call the anesthesia department.    QUESTIONS?:  Please feel free to call your physician or the hospital  if you have any questions, and they will be happy to assist you.      SCOPOLAMINE PATCH:  Remove the scopolamine patch behind your left ear after 24 hours after application.   After removing the patch, wash your hands and the area behind your ear thoroughly with soap and water.   The patch will still contain some medicine after use.   To avoid accidental contact or ingestion by children or pets, fold the used patch in half with the sticky side together and throw away in the trash out of the reach of children and pets.        Instructions for Sinus Surgery    Recovery - Everyone recovers differently from a general anesthetic.  Symptoms such as fatigue, nausea, light-headedness, and sometimes a low grade fever (up to 100 degrees) are not unusual.  As your body removes the anesthetic drugs from circulation, these symptoms will resolve.  Your nose will be sore after surgery, and you may even have symptoms similar to a sinus infection with headache, congestion, and pressure.  These will resolve with healing.  For several days you may experience bloody drainage from the nose, please use the drip pad as necessary for this.  If there is persistent bleeding, please call the office during  business hours or the on call ENT physician after hours.  There are no diet restrictions after sinus surgery, and you can resume your home medications.      Please do not blow your nose until two weeks after surgery.  At 2 weeks you may gently blow your nose, unless otherwise indicated by Dr. Quiroz.     Limit your activity to no strenuous activities until I see you for the first follow-up visit in approximately 2 weeks.      Medications - You were sent home with narcotic pain medication.  If you can tolerate the discomfort during your recovery by using just plain Tylenol or ibuprofen (advil), please do so.  However, do not hesitate to use the stronger pain medication if needed.  If you were sent home with an antibiotic, it is primarily used to help the healing process.  If it causes loose bowel movements or other signs of intolerance, it is appropriate to discontinue it.  By far the most important measure you can take to speed recovery, and maximize the chances of long term success of sinus surgery is using the sinus rinses at least three time per day for the first month after surgery.       Start Rosalba Med saline irrigation tonight and use at least 5 times daily.     1.  You will need to purchase 2 rosalba med bottles.  Make each bottle the same way, using warm distilled water filled up to the 240 ml lana, dissolve 2 packets of salt and mix into each bottle    2.  In one bottle, add the budesonide (steroid), shake gently to dissolve, and irrigate each nostril using the entire bottle divided between nostrils.  Each day you will use two entire bottle of the budesonide solution and will remake this the next day.     3.  In the other rosalba med bottle use only the saline solution, and irrigate with this at least 3 additional times daily.    Perform gentle irrigation for the first week.  Starting 1 week after surgery, you should increase the volume of rosalba med saline irrigation to each nostril, continuing to use the  rinses in an alternating fashion at least 5 times daily.  You cannot use too much of the rosalba med saline, but limit budesonide rinses to twice daily.    At 2 weeks after surgery, you may also restart nasal steroids (flonase, nasonex, etc).        Complications - Problems related to sinus surgery almost always are detected during the operation, and special instruction will be given in that situation.  However, unexpected things can happen, and are all related to the structures around the sinus cavities.  Symptoms that should alert you to a possible problem include: severe eye pain or eye swelling, persistent heavy bleeding from the nose, and high fevers with headache and neck pain.  Any of these symptoms should be called into my office or to the on call ENT if after hours.  The most common non-emergency complication of sinus surgery is the formation of scar tissue which can re-block the sinuses.  This is addressed below.    Follow-up -  As you have noted, there are quite a few follow-up visits after sinus surgery.  This is done to aggressively manage the most common complication of this technique, which is scar tissue blocking the sinuses.  These visits will require the examination of your nose and possibly removal of crusts of dry mucous and blood, with possible removal of early scar tissue.  Please prepare for these visits by using your sinus rinses.    If there are any questions or issues with the above, or if there are other issues that concern you, always feel free to call the clinic and I am happy to speak with you as soon as I can.    Yulisa Quiroz D.O.  Otolaryngology/Head and Neck Surgery  Allergy    [phone] office        Pending Results     No orders found from 12/18/2017 to 12/21/2017.            Admission Information     Date & Time Provider Department Dept. Phone    12/20/2017 Yulisa Quiroz MD HI Preop/Phase -[phone]      Your Vitals Were     Blood Pressure Pulse Temperature  "Respirations Height Weight    117/86 79 97.9  F (36.6  C) (Tympanic) 16 1.651 m (5' 5\") 78.9 kg (174 lb)    Pulse Oximetry BMI (Body Mass Index)                98% 28.96 kg/m2          MyChart Information     [employer] gives you secure access to your electronic health record. If you see a primary care provider, you can also send messages to your care team and make appointments. If you have questions, please call your primary care clinic.  If you do not have a primary care provider, please call [phone] and they will assist you.        Care EveryWhere ID     This is your Care EveryWhere ID. This could be used by other organizations to access your [facility] medical records  [id number]        Equal Access to Services     HERMINIO JI : Nash Cordova, roberto vazquez, rogelio bernal, susanna monahan. So [facility] [phone].    ATENCIÓN: Si habla español, tiene a suero disposición servicios gratuitos de asistencia lingüística. Llame al [phone].    We comply with applicable federal civil rights laws and [place] laws. We do not discriminate on the basis of race, color, national origin, age, disability, sex, sexual orientation, or gender identity.               Review of your medicines      START taking        Dose / Directions    budesonide 0.5 MG/2ML neb solution   Commonly known as:  PULMICORT   Used for:  Post-operative state        Squirt entire vial into previously made rosalba med saline bottle, mix, and irrigate each nostril until entire bottle empty.  Do this twice daily.   Quantity:  3 Box   Refills:  11       cefdinir 300 MG capsule   Commonly known as:  OMNICEF   Used for:  Post-operative state        Dose:  600 mg   Take 2 capsules (600 mg) by mouth daily for 7 days   Quantity:  14 capsule   Refills:  0       HYDROcodone-acetaminophen 7.5-325 MG per tablet   Commonly known as:  NORCO   Used for:  Post-operative state        Dose:  1 tablet   Take 1 tablet " by mouth every 6 hours as needed for moderate to severe pain   Quantity:  15 tablet   Refills:  0       sinus rinse bottle   Used for:  Post-operative state        Use high volume rosalba med saline irrigation. Use warm distilled water and 2 packets of the salt solution that comes with the bottle, dissolve in bottle up to 240 ml lana. Irrigate each side of your nose leaning over the sink, using 1/3 to 1/2 the volume of the bottle in each nostril every irrigation.  Irrigate 5 times daily and as needed.  Add budesonide as indicated   Quantity:  1 each   Refills:  prn         CONTINUE these medicines which have NOT CHANGED        Dose / Directions    albuterol 108 (90 BASE) MCG/ACT Inhaler   Commonly known as:  PROAIR HFA        Dose:  1 puff   Inhale 1 puff into the lungs 3 times daily For 3-5 days.   Quantity:  1 Inhaler   Refills:  0       ALLERGEN IMMUNOTHERAPY PRESCRIPTION   Used for:  Perennial allergic rhinitis        Proceed with SCIT per standard buildup protocol.   Quantity:  5 mL   Refills:  PRN       azelastine-fluticasone 137-50 MCG/ACT nasal spray   Commonly known as:  DYMISTA   Used for:  Perennial allergic rhinitis with seasonal variation, Seasonal allergic rhinitis, unspecified allergic rhinitis trigger        Dose:  1 spray   Spray 1 spray into both nostrils 2 times daily   Quantity:  2 Bottle   Refills:  3       budesonide-formoterol 160-4.5 MCG/ACT Inhaler   Commonly known as:  SYMBICORT        Dose:  2 puff   Inhale 2 puffs into the lungs 2 times daily   Refills:  0       dexlansoprazole 60 MG Cpdr CR capsule   Commonly known as:  DEXILANT        Dose:  60 mg   Take 60 mg by mouth daily   Refills:  0       EPINEPHrine 0.3 MG/0.3ML injection   Commonly known as:  EPIPEN   Used for:  Chronic rhinitis        Dose:  0.3 mg   Inject 0.3 mLs into the muscle once as needed for anaphylaxis for 1 dose.   Quantity:  2 each   Refills:  3       fluticasone 50 MCG/ACT spray   Commonly known as:  FLONASE   Used  for:  Chronic rhinitis        Dose:  2 spray   Spray 2 sprays into both nostrils daily   Quantity:  16 g   Refills:  11       loratadine 10 MG tablet   Commonly known as:  CLARITIN   Used for:  Allergic rhinitis due to pollen, Seasonal allergic rhinitis, Perennial allergic rhinitis with seasonal variation        TAKE 1 TABLET DAILY.   Quantity:  30 tablet   Refills:  11       montelukast 10 MG tablet   Commonly known as:  SINGULAIR   Used for:  Perennial allergic rhinitis with seasonal variation, Seasonal allergic rhinitis, Allergic rhinitis due to pollen        TAKE ONE TABLET AT BEDTIME.   Quantity:  90 tablet   Refills:  3       multivitamin, therapeutic with minerals Tabs tablet        Dose:  1 tablet   Take 1 tablet by mouth daily   Refills:  0       vitamin D 02162 UNIT capsule   Commonly known as:  ERGOCALCIFEROL        Dose:  2000 Units   Take 2,000 Units by mouth daily   Refills:  0            Where to get your medicines      These medications were sent to [facility] - [address]  [address] [place] [address]     Phone:  [phone]     cefdinir 300 MG capsule         Some of these will need a paper prescription and others can be bought over the counter. Ask your nurse if you have questions.     Bring a paper prescription for each of these medications     budesonide 0.5 MG/2ML neb solution    HYDROcodone-acetaminophen 7.5-325 MG per tablet    sinus rinse bottle               ANTIBIOTIC INSTRUCTION     You've Been Prescribed an Antibiotic - Now What?  Your healthcare team thinks that you or your loved one might have an infection. Some infections can be treated with antibiotics, which are powerful, life-saving drugs. Like all medications, antibiotics have side effects and should only be used when necessary. There are some important things you should know about your antibiotic treatment.      Your healthcare team may run tests before you start taking an  antibiotic.    Your team may take samples (e.g., from your blood, urine or other areas) to run tests to look for bacteria. These test can be important to determine if you need an antibiotic at all and, if you do, which antibiotic will work best.      Within a few days, your healthcare team might change or even stop your antibiotic.    Your team may start you on an antibiotic while they are working to find out what is making you sick.    Your team might change your antibiotic because test results show that a different antibiotic would be better to treat your infection.    In some cases, once your team has more information, they learn that you do not need an antibiotic at all. They may find out that you don't have an infection, or that the antibiotic you're taking won't work against your infection. For example, an infection caused by a virus can't be treated with antibiotics. Staying on an antibiotic when you don't need it is more likely to be harmful than helpful.      You may experience side effects from your antibiotic.    Like all medications, antibiotics have side effects. Some of these can be serious.    Let you healthcare team know if you have any known allergies when you are admitted to the hospital.    One significant side effect of nearly all antibiotics is the risk of severe and sometimes deadly diarrhea caused by Clostridium difficile (C. Difficile). This occurs when a person takes antibiotics because some good germs are destroyed. Antibiotic use allows C. diificile to take over, putting patients at high risk for this serious infection.    As a patient or caregiver, it is important to understand your or your loved one's antibiotic treatment. It is especially important for caregivers to speak up when patients can't speak for themselves. Here are some important questions to ask your healthcare team.    What infection is this antibiotic treating and how do you know I have that infection?    What side effects  might occur from this antibiotic?    How long will I need to take this antibiotic?    Is it safe to take this antibiotic with other medications or supplements (e.g., vitamins) that I am taking?     Are there any special directions I need to know about taking this antibiotic? For example, should I take it with food?    How will I be monitored to know whether my infection is responding to the antibiotic?    What tests may help to make sure the right antibiotic is prescribed for me?      Information provided by:  www.cdc.gov/getsmart  U.S. Department of Health and Human Services  Centers for disease Control and Prevention  National Center for Emerging and Zoonotic Infectious Diseases  Division of Healthcare Quality Promotion         Protect others around you: Learn how to safely use, store and throw away your medicines at www.disposemymeds.org.             Medication List: This is a list of all your medications and when to take them. Check marks below indicate your daily home schedule. Keep this list as a reference.      Medications           Morning Afternoon Evening Bedtime As Needed    albuterol 108 (90 BASE) MCG/ACT Inhaler   Commonly known as:  PROAIR HFA   Inhale 1 puff into the lungs 3 times daily For 3-5 days.                                ALLERGEN IMMUNOTHERAPY PRESCRIPTION   Proceed with SCIT per standard buildup protocol.                                azelastine-fluticasone 137-50 MCG/ACT nasal spray   Commonly known as:  DYMISTA   Spray 1 spray into both nostrils 2 times daily                                budesonide 0.5 MG/2ML neb solution   Commonly known as:  PULMICORT   Squirt entire vial into previously made rosalba med saline bottle, mix, and irrigate each nostril until entire bottle empty.  Do this twice daily.                                budesonide-formoterol 160-4.5 MCG/ACT Inhaler   Commonly known as:  SYMBICORT   Inhale 2 puffs into the lungs 2 times daily                                 cefdinir 300 MG capsule   Commonly known as:  OMNICEF   Take 2 capsules (600 mg) by mouth daily for 7 days                                dexlansoprazole 60 MG Cpdr CR capsule   Commonly known as:  DEXILANT   Take 60 mg by mouth daily                                EPINEPHrine 0.3 MG/0.3ML injection   Commonly known as:  EPIPEN   Inject 0.3 mLs into the muscle once as needed for anaphylaxis for 1 dose.                                fluticasone 50 MCG/ACT spray   Commonly known as:  FLONASE   Spray 2 sprays into both nostrils daily                                HYDROcodone-acetaminophen 7.5-325 MG per tablet   Commonly known as:  NORCO   Take 1 tablet by mouth every 6 hours as needed for moderate to severe pain                                loratadine 10 MG tablet   Commonly known as:  CLARITIN   TAKE 1 TABLET DAILY.                                montelukast 10 MG tablet   Commonly known as:  SINGULAIR   TAKE ONE TABLET AT BEDTIME.                                multivitamin, therapeutic with minerals Tabs tablet   Take 1 tablet by mouth daily                                sinus rinse bottle   Use high volume rosalba med saline irrigation. Use warm distilled water and 2 packets of the salt solution that comes with the bottle, dissolve in bottle up to 240 ml lana. Irrigate each side of your nose leaning over the sink, using 1/3 to 1/2 the volume of the bottle in each nostril every irrigation.  Irrigate 5 times daily and as needed.  Add budesonide as indicated                                vitamin D 78690 UNIT capsule   Commonly known as:  ERGOCALCIFEROL   Take 2,000 Units by mouth daily

## 2017-12-20 NOTE — OP NOTE
PREOPERATIVE DIAGNOSES:   1. Chronic recurrent sinusitis.   2. Nasal obstruction.   3. Deviated nasal septum  4. Inferior turbinate hypertrophy  5. Left nasal valve collapse    POSTOPERATIVE DIAGNOSES:   1. same    PROCEDURES PERFORMED:   1.  Bilateral endoscopic frontal sinusotomy  2.  Bilateral total ethmoidectomy  3.  Bilateral maxillary antrostomy with tissue removal   4.  Septoplasty with cartilage reinsertion  5.  Repair left nasal valve using septal cartilage graft  6.  Bilateral submucosal inferior turbinate reduction    Sinus procedures above performed using [employer]  navigation.    SURGEON: Yulisa Quiroz D.O.  BLOOD LOSS: 5 ml   SPECIMENS: to pathology  ANESTHESIA: General Endotracheal   FINDINGS:  Large frontal sinus volume bilaterally  Large ethmoid bulla bilaterally,  partially occlusive to natural maxillary ostia  Left external and internal nasal valve collapse  INDICATIONS: Sydni Isabel presented to me with a long history of chronic nasal disease and chronic sinusitis.   Therefore, my recommendation was for the above-named procedures. Preoperatively, risks discussed included the risks of infection, bleeding, the risks of general anesthesia, possible recurrence of sinus disease, possible injury to the eyes, base of skull and tear duct system, and possible alteration of sense of smell, although the patient has not had a sense of smell for many years. The patient understood these risks and possible outcomes and wished to proceed.   OPERATIVE PROCEDURE: After being taken to the operating room and induction of general endotracheal anesthesia, the bed was rotated 90 degrees.  I began by using a rigid scope to anesthetize the lateral nasal wall, septum and inferior turbinates bilaterally, using a spinal needle with 1% lidocaine with 1:100,00 epinephrine.  I then applied topical anesthetic in the form of 2 cottonoids on each side of the nose which had been soaked with a total of 4 mL of 4% liquid  cocaine.   After several minutes, the pledgets were removed.   I then proceeded with the septoplasty. I made a right hemitransfixion incision 2 mm posterior to the caudal septum.   I then dissected down onto cartilaginous septum and created a right anterior inferior tunnel with a miguel ángel elevator.  I then continued dissection through the right-sided incision to the left side. I then was able to start a mucoperichondrial pocket directly on the left side of the cartilaginous septum.  A left anterior-inferior as well as a posterior mucoperichondrial and periosteal flap was elevated with a miguel ángel. After I completely elevated the mucoperichondrium off the left side of the septum, I then made a hemitransfixion incision through the cartilage approximately 1.5-2.0 cm back from the anterior edge. I broke over to the right side and raised a submucoperiosteal flap on the entire right side of the nasal septum.  I was able to carefully tease the mucoperichondrium off the large left posterior spur. After this was done, I used a d-knife and swivel knife to remove the deviate portion of the rhomboid  cartilaginous septum, and I removed it in one large piece.  Obstructive portions of the bony septum were removed with a d-knife, chisel and Blakesley forceps.  The deviated remaining caudal septum was incised on the concave side and suture fixation medialized the caudal septum.  It was brought to the back table.      I used the cutting block to harvest a convex piece of cartilage over 1x1 cm for use in the left nasal valve repair.  This was set aside in a wet raytech.    The remainder of the quadrangular cartilage was morselized, and straightened with hash marks on the concavity.  I then laid the cartilage back into the midline position between the mucoperichondrial flaps.   I laid the flaps back together and this significantly improved the nasal airway. I then closed my septoplasty incision with a running horizontal mattress suture  of 4-0 vicryl and chronic.  The hemitransfixion incision was closed with interrupted 4-0 chromic.  The septum is intact and midline.    I then proceeded with the sinus surgery using the 30 degree sinus scope.  Attention was first turned to the left side .  I identified the uncinate process and incised the uncinate at its medial edge with the freer.  I used the sinus seeker and rotating backbiter and trimmed away at the uncinate process to expose the natural ostium of the left maxillary sinus. A maxillary sinus seeker and back biting Blakesley was used to reflect a small amount of mucosa to better view the ostia.   The natural ostia was enlarged slightly with the medtronic shaver after identifying and preserving the orbit.   I entered the left maxillary sinus and removed mucopurulence and polypoid tissue from the left maxillary sinus.    After this was done, I then confirmed the position of the left ethmoid bulla and took down the face of the ethmoid bulla.  The bulla was penetrated with the straight suction inferior and medial, and the shaver was then used for the ethmoidectomy. The lamina was identified and preserved.  I skeletonized the horizontal lamella and proceeded directly posteriorly through several layers of posterior ethmoid air cells which were completely filled with polypoid mucosa and inspissated secretions. The skull base was identified and preserved.  After reaching the face of the sphenoid sinus on the left side, I had found the posterior extent of the roof of the ethmoid. I then was able to dissect in a posterior to anterior direction, removing polyps and bony septations along the way. Eventually I crossed the ethmoid infundibulum into the anterior ethmoid system.   At this point, I used the up biting through cut and the [employer] microdebrider blade and trimmed away at the anterior insertion of the left middle turbinate to expose the agger nasi cell. I used the shaver to remove several more  anterior ethmoid air cells.   I then entered the left frontal recess with the relieva guide and advanced the guidewire into the frontal sinus using transillumination for anatomic confirmation. The balloon was inflated to 10 mm chantelle pressure x 3 from cephalad to caudal in serial dilations, until the frontal sinus was widely patent.  The sinus was irrigated and gently suctioned.   At this point, I turned my attention to the right nasal cavity.   The right maxillary antrostomy and total ethmoidectomy, frontal sinusotomy were performed in a similar fashion with similar results and findings.    The sinus cavities were irrigated and gently suctioned throughout the procedure, and photos were taken.    I then turned my attention to the left nasal valve repair.    The collapsed portion of the nasal ala was demarcated preoperatively.  I then made a left marginal incision in the nasal mucosa and dissected in a subcutaneous plane just cephalad to the lateral alar cartilage.  I disscted down to the pyriform aperture and used a freer to enlarge the subcutaneous pocket.  Hemostasis was achieved with scant use of cautery at 10.  The wound was irrigated and gently suctioned.  I then inserted the previously harvested cartilage with the convexity facing upward just superficial to the lower lateral cartilage and resting on the pyriform aperture.  The nares were examined, and the internal and external nasal valve position is improved.  Pre and post operative photos taken.  The incision was closed using 4-0 chromic, passing the suture through the cartilage to secure it in place.    I then proceeded with the final component of the surgery, the turbinate reduction.  Additional 1% lidocaine with 1:100,000 of epinephrine was injected into the inferior turbinates.  Attention was first turned to the right turbinate.  The coblation turbinate blade was advanced with coblation at 5 to the distal demarcation.  Submucosal reduction was performed for  10 seconds, the blade was then pulled back slightly to the proximal demarcation and a second 10 second reduction was performed.  The turbinate was out fractured with a septal displacer.  The left turbinate was reduced and out fractured in a similar fashion.  A small amount of bacitracin was applied bilaterally after hemostasis was achieved the the coagulation setting at 2.    The patient was handed back over to anesthesia, awakened and brought to the recovery room in stable condition.

## 2017-12-21 NOTE — ANESTHESIA POSTPROCEDURE EVALUATION
Patient: Sydni Isabel    Procedure(s):  ENDOSCOPIC SINUS SURGERY, SEPTOPLASTY, TURBINATE REDUCTION, REPAIR LEFT NASAL VALVE - Wound Class: II-Clean Contaminated   - Wound Class: II-Clean Contaminated    Diagnosis:CHRONIC PANSINUSITIS, DEVIATED NASAL SEPTUM, NASAL TURBINATE HYPERTROPHY, NASAL VALVE COLLAPSE, FACIAL PAIN  Diagnosis Additional Information: No value filed.    Anesthesia Type:  General, ETT    Note:  Anesthesia Post Evaluation    Patient location during evaluation: PACU  Patient participation: Able to fully participate in evaluation  Level of consciousness: awake and alert  Pain management: adequate  Airway patency: patent  Cardiovascular status: acceptable  Respiratory status: acceptable  Hydration status: acceptable  PONV: none             Last vitals:  Vitals:    12/20/17 1532 12/20/17 1545 12/20/17 1600   BP: 114/84 115/77 115/77   Pulse:      Resp: 16 16 16   Temp:   97.4  F (36.3  C)   SpO2: 100% 99% 94%         Electronically Signed By: CASSANDRA Summers CRNA  December 21, 2017  1:03 PM

## 2017-12-22 LAB — COPATH REPORT: NORMAL

## 2017-12-27 ENCOUNTER — OFFICE VISIT (OUTPATIENT)
Dept: OTOLARYNGOLOGY | Facility: OTHER | Age: 37
End: 2017-12-27
Attending: PHYSICIAN ASSISTANT
Payer: COMMERCIAL

## 2017-12-27 VITALS
SYSTOLIC BLOOD PRESSURE: 114 MMHG | DIASTOLIC BLOOD PRESSURE: 73 MMHG | WEIGHT: 174 LBS | HEART RATE: 85 BPM | BODY MASS INDEX: 28.99 KG/M2 | TEMPERATURE: 98.8 F | OXYGEN SATURATION: 98 % | RESPIRATION RATE: 16 BRPM | HEIGHT: 65 IN

## 2017-12-27 DIAGNOSIS — Z98.890 S/P FESS (FUNCTIONAL ENDOSCOPIC SINUS SURGERY): Primary | ICD-10-CM

## 2017-12-27 DIAGNOSIS — J30.2 CHRONIC SEASONAL ALLERGIC RHINITIS, UNSPECIFIED TRIGGER: ICD-10-CM

## 2017-12-27 DIAGNOSIS — J30.89 PERENNIAL ALLERGIC RHINITIS WITH SEASONAL VARIATION: ICD-10-CM

## 2017-12-27 DIAGNOSIS — J32.4 CHRONIC PANSINUSITIS: ICD-10-CM

## 2017-12-27 DIAGNOSIS — J30.2 PERENNIAL ALLERGIC RHINITIS WITH SEASONAL VARIATION: ICD-10-CM

## 2017-12-27 DIAGNOSIS — Z09 POSTOPERATIVE EXAMINATION: ICD-10-CM

## 2017-12-27 PROCEDURE — 31237 NSL/SINS NDSC SURG BX POLYPC: CPT | Mod: 79 | Performed by: PHYSICIAN ASSISTANT

## 2017-12-27 PROCEDURE — 99212 OFFICE O/P EST SF 10 MIN: CPT

## 2017-12-27 PROCEDURE — 31237 NSL/SINS NDSC SURG BX POLYPC: CPT

## 2017-12-27 PROCEDURE — 99024 POSTOP FOLLOW-UP VISIT: CPT | Performed by: PHYSICIAN ASSISTANT

## 2017-12-27 RX ORDER — PANTOPRAZOLE SODIUM 40 MG/1
40 FOR SUSPENSION ORAL
COMMUNITY

## 2017-12-27 RX ORDER — MULTIVIT-MIN/IRON/FOLIC ACID/K 18-600-40
2000 CAPSULE ORAL
COMMUNITY
End: 2021-02-19

## 2017-12-27 RX ORDER — MULTIPLE VITAMINS W/ MINERALS TAB 9MG-400MCG
TAB ORAL
COMMUNITY
Start: 2013-11-27

## 2017-12-27 ASSESSMENT — PAIN SCALES - GENERAL: PAINLEVEL: NO PAIN (0)

## 2017-12-27 NOTE — PROGRESS NOTES
Chief Complaint   Patient presents with     Surgical Followup     S/p fess, septo, TR, repair L nasal valve using septal cartilage graft 12/20/17     hospitals - Sydni ALAINA Isabel is here for their first postoperative visit, status post endoscopic sinus surgery (with septoplasty and turbinate reduction) performed on 12/20/17.  There was the expected amount of congestion which has now mostly resolved, and no bleeding has occurred.  The generalized facial pressure has been resolving, and no active complaints.  The sinus rinses are continuing three times per day, and are being tolerated well.  No visual changes.    PROCEDURES PERFORMED:   1.  Bilateral endoscopic frontal sinusotomy  2.  Bilateral total ethmoidectomy  3.  Bilateral maxillary antrostomy with tissue removal   4.  Septoplasty with cartilage reinsertion  5.  Repair left nasal valve using septal cartilage graft  6.  Bilateral submucosal inferior turbinate reduction    Sinus procedures above performed using [employer]  navigation.     SURGEON: Yulisa Quiroz D.O.  BLOOD LOSS: 5 ml   SPECIMENS: to pathology  ANESTHESIA: General Endotracheal   FINDINGS:  Large frontal sinus volume bilaterally  Large ethmoid bulla bilaterally,  partially occlusive to natural maxillary ostia  Left external and internal nasal valve collapse  No past medical history on file.     Allergies   Allergen Reactions     Seasonal Allergies Cough     Current Outpatient Prescriptions   Medication     cefdinir (OMNICEF) 300 MG capsule     Hypertonic Nasal Wash (SINUS RINSE) bottle     budesonide (PULMICORT) 0.5 MG/2ML neb solution     budesonide-formoterol (SYMBICORT) 160-4.5 MCG/ACT Inhaler     montelukast (SINGULAIR) 10 MG tablet     loratadine (CLARITIN) 10 MG tablet     ORDER FOR ALLERGEN IMMUNOTHERAPY     dexlansoprazole (DEXILANT) 60 MG CPDR CR capsule     azelastine-fluticasone (DYMISTA) 137-50 MCG/ACT nasal spray     fluticasone (FLONASE) 50 MCG/ACT nasal spray     multivitamin,  "therapeutic with minerals (THERA-VIT-M) TABS     albuterol (ALBUTEROL) 108 (90 BASE) MCG/ACT inhaler     vitamin D (ERGOCALCIFEROL) 22665 UNIT capsule     EPINEPHrine (EPIPEN) 0.3 MG/0.3ML injection     No current facility-administered medications for this visit.       ROS: 10 point ROS neg other than the symptoms noted above in the HPI.  /73  Pulse 85  Temp 98.8  F (37.1  C) (Tympanic)  Resp 16  Ht 5' 5\" (1.651 m)  Wt 174 lb (78.9 kg)  SpO2 98%  BMI 28.96 kg/m2  General - The patient is awake and alert, and answers questions appropriately during the history and physical.  The vocal quality is hypernasal, but there is no dyspnea or stridor noted.  Eyes - The EOMI, there is no conjuncitval or scleral injection.  Pupils are equally round and reactive to light.  Oral - The oral mucosa is pink and moist.  The tongue is mobile and midline on protrusion, no edema noted.  Nasal - The nasal examination was done with a rigid nasal endoscope today for the purposes of bilateral endoscopically assisted debridement of the sinuses.  I began by spraying both sides with lidocaine and neosynephrine.   I began on the left side.  The middle meatus was noted to obstructed with a dark crust, this was gently dislodged from the lateral wall with a number 7 suction, I was then able to visualize MM. Unable to suction, tissue edematous.  I turned my attention to the right side.  Once again some dark crust was dislodged from the middle meatus and removed from the nose.  I was then able to pass the scope into the right middle meatus.  Limited exam.   I was then able to visualize a nicely remucosalizing surface.  Bialterally, no early synechiae or touch points were noted.    Left valve appears improved. 2 chromic sutures remain in place.     ASSESSMENT:    ICD-10-CM    1. S/P FESS (functional endoscopic sinus surgery) Z98.890    2. Postoperative examination Z09    3. Chronic pansinusitis J32.4    4. Perennial allergic rhinitis with " seasonal variation J30.89     J30.2    5. Chronic seasonal allergic rhinitis, unspecified trigger J30.2           Sydni Isabel is status post endoscopic nasal surgery and does not show any signs of complications.  Dr. Quiroz will see the patient back in one month for the second postoperative visit.   Follow up next week for repeat suctioning.     Follow postop instructions.     Martina Dominguez PA-C  ENT  [facility]  [phone]

## 2017-12-27 NOTE — MR AVS SNAPSHOT
After Visit Summary   12/27/2017    Sydni Isabel    MRN: [id number]           Patient Information     Date Of Birth          1980        Visit Information        Provider Department      12/27/2017 11:15 AM Martina Dominguez PA-C [facility]        Today's Diagnoses     S/P FESS (functional endoscopic sinus surgery)    -  1    Postoperative examination        Chronic pansinusitis        Perennial allergic rhinitis with seasonal variation        Chronic seasonal allergic rhinitis, unspecified trigger          Care Instructions    Continue with Pa Med rinse and Budesonide rinse.   Follow up next week for recheck/sinus check  Follow postop instructions.     Thank you for allowing JAIMEE Basilio and our ENT team to participate in your care.  If your medications are too expensive, please give the nurse a call.  We can possibly change this medication.  If you have a scheduling or an appointment question please contact [facility] Unit Coordinator at their direct line [phone].   ALL nursing questions or concerns can be directed to your ENT nurse at: [phone] [facility]              Follow-ups after your visit        Follow-up notes from your care team     Return in about 1 week (around 1/3/2018).      Your next 10 appointments already scheduled     Jan 11, 2018 10:30 AM CST   (Arrive by 10:15 AM)   Return Visit with MD Betzaida [place] [facility] [place] ([facility] )    [address] [place] [address]   [phone]              Who to contact     If you have questions or need follow up information about today's clinic visit or your schedule please contact [facility] directly at [phone].  Normal or non-critical lab and imaging results will be communicated to you by MyChart, letter or phone within 4 business days after the clinic has received the results. If you do not hear from us within 7 days, please contact the clinic  "through [employer] or phone. If you have a critical or abnormal lab result, we will notify you by phone as soon as possible.  Submit refill requests through [employer] or call your pharmacy and they will forward the refill request to us. Please allow 3 business days for your refill to be completed.          Additional Information About Your Visit        [employer] Information     [employer] gives you secure access to your electronic health record. If you see a primary care provider, you can also send messages to your care team and make appointments. If you have questions, please call your primary care clinic.  If you do not have a primary care provider, please call [phone] and they will assist you.        Care EveryWhere ID     This is your Care EveryWhere ID. This could be used by other organizations to access your [facility] medical records  [id number]        Your Vitals Were     Pulse Temperature Respirations Height Pulse Oximetry BMI (Body Mass Index)    85 98.8  F (37.1  C) (Tympanic) 16 5' 5\" (1.651 m) 98% 28.96 kg/m2       Blood Pressure from Last 3 Encounters:   12/27/17 114/73   12/20/17 115/77   10/06/17 127/73    Weight from Last 3 Encounters:   12/27/17 174 lb (78.9 kg)   12/20/17 174 lb (78.9 kg)   09/26/17 170 lb (77.1 kg)              Today, you had the following     No orders found for display       Primary Care Provider Office Phone # Fax #    Go Villarreal [phone]       [facility] [address] [facility] [address]        Equal Access to Services     [facility]: Hadii aad ku hadasho Soomaali, waaxda luqadaha, qaybta kaalmada adeegyalatosha, susanna maier . So [facility] [phone].    ATENCIÓN: Si habla español, tiene a suero disposición servicios gratuitos de asistencia lingüística. Llame al [phone].    We comply with applicable federal civil rights laws and [place] laws. We do not discriminate on the basis of race, color, national origin, age, " disability, sex, sexual orientation, or gender identity.            Thank you!     Thank you for choosing [facility]  for your care. Our goal is always to provide you with excellent care. Hearing back from our patients is one way we can continue to improve our services. Please take a few minutes to complete the written survey that you may receive in the mail after your visit with us. Thank you!             Your Updated Medication List - Protect others around you: Learn how to safely use, store and throw away your medicines at www.disposemymeds.org.          This list is accurate as of: 12/27/17 11:28 AM.  Always use your most recent med list.                   Brand Name Dispense Instructions for use Diagnosis    albuterol 108 (90 BASE) MCG/ACT Inhaler    PROAIR HFA    1 Inhaler    Inhale 1 puff into the lungs 3 times daily For 3-5 days.        ALLERGEN IMMUNOTHERAPY PRESCRIPTION     5 mL    Proceed with SCIT per standard buildup protocol.    Perennial allergic rhinitis       azelastine-fluticasone 137-50 MCG/ACT nasal spray    DYMISTA    2 Bottle    Spray 1 spray into both nostrils 2 times daily    Perennial allergic rhinitis with seasonal variation, Seasonal allergic rhinitis, unspecified allergic rhinitis trigger       budesonide 0.5 MG/2ML neb solution    PULMICORT    3 Box    Squirt entire vial into previously made rosalba med saline bottle, mix, and irrigate each nostril until entire bottle empty.  Do this twice daily.    Post-operative state       budesonide-formoterol 160-4.5 MCG/ACT Inhaler    SYMBICORT     Inhale 2 puffs into the lungs 2 times daily        cefdinir 300 MG capsule    OMNICEF    14 capsule    Take 2 capsules (600 mg) by mouth daily for 7 days    Post-operative state       dexlansoprazole 60 MG Cpdr CR capsule    DEXILANT     Take 60 mg by mouth daily        EPINEPHrine 0.3 MG/0.3ML injection    EPIPEN    2 each    Inject 0.3 mLs into the muscle once as needed for anaphylaxis for 1  dose.    Chronic rhinitis       fluticasone 50 MCG/ACT spray    FLONASE    16 g    Spray 2 sprays into both nostrils daily    Chronic rhinitis       loratadine 10 MG tablet    CLARITIN    30 tablet    TAKE 1 TABLET DAILY.    Allergic rhinitis due to pollen, Seasonal allergic rhinitis, Perennial allergic rhinitis with seasonal variation       montelukast 10 MG tablet    SINGULAIR    90 tablet    TAKE ONE TABLET AT BEDTIME.    Perennial allergic rhinitis with seasonal variation, Seasonal allergic rhinitis, Allergic rhinitis due to pollen       * multivitamin, therapeutic with minerals Tabs tablet      Take 1 tablet by mouth daily        * Multi-vitamin Tabs tablet           pantoprazole sodium 40 MG packet    PROTONIX     Take 40 mg by mouth        sinus rinse bottle     1 each    Use high volume rosalba med saline irrigation. Use warm distilled water and 2 packets of the salt solution that comes with the bottle, dissolve in bottle up to 240 ml lana. Irrigate each side of your nose leaning over the sink, using 1/3 to 1/2 the volume of the bottle in each nostril every irrigation.  Irrigate 5 times daily and as needed.  Add budesonide as indicated    Post-operative state       Vitamin D (Cholecalciferol) 1000 UNITS Tabs      Take 2,000 Units by mouth        vitamin D 01366 UNIT capsule    ERGOCALCIFEROL     Take 2,000 Units by mouth daily        * Notice:  This list has 2 medication(s) that are the same as other medications prescribed for you. Read the directions carefully, and ask your doctor or other care provider to review them with you.

## 2017-12-27 NOTE — PATIENT INSTRUCTIONS
Continue with Pa Med rinse and Budesonide rinse.   Follow up next week for recheck/sinus check  Follow postop instructions.     Thank you for allowing JAIMEE Basilio and our ENT team to participate in your care.  If your medications are too expensive, please give the nurse a call.  We can possibly change this medication.  If you have a scheduling or an appointment question please contact [facility] Unit Coordinator at their direct line [phone].   ALL nursing questions or concerns can be directed to your ENT nurse at: [phone] Michelle

## 2017-12-27 NOTE — NURSING NOTE
"Chief Complaint   Patient presents with     Surgical Followup     S/p fess, septo, TR, repair L nasal valve using septal cartilage graft 12/20/17       Initial /73  Pulse 85  Temp 98.8  F (37.1  C) (Tympanic)  Resp 16  Ht 5' 5\" (1.651 m)  Wt 174 lb (78.9 kg)  SpO2 98%  BMI 28.96 kg/m2 Estimated body mass index is 28.96 kg/(m^2) as calculated from the following:    Height as of this encounter: 5' 5\" (1.651 m).    Weight as of this encounter: 174 lb (78.9 kg).  Medication Reconciliation: complete     Ene Tobar LPN  "

## 2018-01-11 ENCOUNTER — OFFICE VISIT (OUTPATIENT)
Dept: OTOLARYNGOLOGY | Facility: OTHER | Age: 38
End: 2018-01-11
Attending: PHYSICIAN ASSISTANT

## 2018-01-11 VITALS
WEIGHT: 174 LBS | SYSTOLIC BLOOD PRESSURE: 112 MMHG | TEMPERATURE: 97.9 F | DIASTOLIC BLOOD PRESSURE: 70 MMHG | HEART RATE: 76 BPM | BODY MASS INDEX: 28.99 KG/M2 | HEIGHT: 65 IN

## 2018-01-11 DIAGNOSIS — Z98.890 S/P FESS (FUNCTIONAL ENDOSCOPIC SINUS SURGERY): Primary | ICD-10-CM

## 2018-01-11 PROCEDURE — G0463 HOSPITAL OUTPT CLINIC VISIT: HCPCS | Mod: 25

## 2018-01-11 PROCEDURE — 99024 POSTOP FOLLOW-UP VISIT: CPT | Performed by: OTOLARYNGOLOGY

## 2018-01-11 PROCEDURE — 31231 NASAL ENDOSCOPY DX: CPT | Performed by: OTOLARYNGOLOGY

## 2018-01-11 ASSESSMENT — PAIN SCALES - GENERAL: PAINLEVEL: NO PAIN (0)

## 2018-01-11 NOTE — MR AVS SNAPSHOT
After Visit Summary   1/11/2018    Sydni Isabel    MRN: [id number]           Patient Information     Date Of Birth          1980        Visit Information        Provider Department      1/11/2018 10:30 AM Yulisa Quiroz MD [facility]        Care Instructions    Thank you for allowing Dr. Quiroz and our ENT team to participate in your care.  If your medications are too expensive, please give the nurse a call.  We can possibly change this medication.  If you have a scheduling or an appointment question please contact [facility] Coordinator at their direct line [phone].   ALL nursing questions or concerns can be directed to your ENT nurse at: [phone] - Patrizia    Continue Budesonide Rinses Twice Daily for 1 month  After 1 month, use plain Pa Med Saline Once Daily Indefinitely  Continue Flonase as prescribed  In 1 month, start to gently massage the left side of your nose  Follow up with ENT as needed          Follow-ups after your visit        Follow-up notes from your care team     Return if symptoms worsen or fail to improve.      Who to contact     If you have questions or need follow up information about today's clinic visit or your schedule please contact [facility] directly at [phone].  Normal or non-critical lab and imaging results will be communicated to you by MyChart, letter or phone within 4 business days after the clinic has received the results. If you do not hear from us within 7 days, please contact the clinic through MyChart or phone. If you have a critical or abnormal lab result, we will notify you by phone as soon as possible.  Submit refill requests through [employer] or call your pharmacy and they will forward the refill request to us. Please allow 3 business days for your refill to be completed.          Additional Information About Your Visit        [employer] Information     [employer] gives you secure access to your  "electronic health record. If you see a primary care provider, you can also send messages to your care team and make appointments. If you have questions, please call your primary care clinic.  If you do not have a primary care provider, please call [phone] and they will assist you.        Care EveryWhere ID     This is your Care EveryWhere ID. This could be used by other organizations to access your [facility] medical records  [id number]        Your Vitals Were     Pulse Temperature Height BMI (Body Mass Index)          76 97.9  F (36.6  C) (Tympanic) 1.651 m (5' 5\") 28.96 kg/m2         Blood Pressure from Last 3 Encounters:   01/11/18 112/70   12/27/17 114/73   12/20/17 115/77    Weight from Last 3 Encounters:   01/11/18 78.9 kg (174 lb)   12/27/17 78.9 kg (174 lb)   12/20/17 78.9 kg (174 lb)              Today, you had the following     No orders found for display       Primary Care Provider Office Phone # Fax #    Go Villarreal [phone]       [facility] [address]  [facility] [address]        Equal Access to Services     JANESSA JI : Hadii alonzo justino Sojoanne, waaxda luqadaha, qaybta kaalmada adeegyada, susanna monahan. So [facility] [phone].    ATENCIÓN: Si habla español, tiene a suero disposición servicios gratuitos de asistencia lingüística. Letty al [phone].    We comply with applicable federal civil rights laws and [place] laws. We do not discriminate on the basis of race, color, national origin, age, disability, sex, sexual orientation, or gender identity.            Thank you!     Thank you for choosing [facility]  for your care. Our goal is always to provide you with excellent care. Hearing back from our patients is one way we can continue to improve our services. Please take a few minutes to complete the written survey that you may receive in the mail after your visit with us. Thank you!             Your Updated " Medication List - Protect others around you: Learn how to safely use, store and throw away your medicines at www.disposemymeds.org.          This list is accurate as of: 1/11/18 10:43 AM.  Always use your most recent med list.                   Brand Name Dispense Instructions for use Diagnosis    albuterol 108 (90 BASE) MCG/ACT Inhaler    PROAIR HFA    1 Inhaler    Inhale 1 puff into the lungs 3 times daily For 3-5 days.        ALLERGEN IMMUNOTHERAPY PRESCRIPTION     5 mL    Proceed with SCIT per standard buildup protocol.    Perennial allergic rhinitis       azelastine-fluticasone 137-50 MCG/ACT nasal spray    DYMISTA    2 Bottle    Spray 1 spray into both nostrils 2 times daily    Perennial allergic rhinitis with seasonal variation, Seasonal allergic rhinitis, unspecified allergic rhinitis trigger       budesonide 0.5 MG/2ML neb solution    PULMICORT    3 Box    Squirt entire vial into previously made rosalba med saline bottle, mix, and irrigate each nostril until entire bottle empty.  Do this twice daily.    Post-operative state       budesonide-formoterol 160-4.5 MCG/ACT Inhaler    SYMBICORT     Inhale 2 puffs into the lungs 2 times daily        dexlansoprazole 60 MG Cpdr CR capsule    DEXILANT     Take 60 mg by mouth daily        EPINEPHrine 0.3 MG/0.3ML injection    EPIPEN    2 each    Inject 0.3 mLs into the muscle once as needed for anaphylaxis for 1 dose.    Chronic rhinitis       fluticasone 50 MCG/ACT spray    FLONASE    16 g    Spray 2 sprays into both nostrils daily    Chronic rhinitis       loratadine 10 MG tablet    CLARITIN    30 tablet    TAKE 1 TABLET DAILY.    Allergic rhinitis due to pollen, Seasonal allergic rhinitis, Perennial allergic rhinitis with seasonal variation       montelukast 10 MG tablet    SINGULAIR    90 tablet    TAKE ONE TABLET AT BEDTIME.    Perennial allergic rhinitis with seasonal variation, Seasonal allergic rhinitis, Allergic rhinitis due to pollen       * multivitamin,  therapeutic with minerals Tabs tablet      Take 1 tablet by mouth daily        * Multi-vitamin Tabs tablet           pantoprazole sodium 40 MG packet    PROTONIX     Take 40 mg by mouth        sinus rinse bottle     1 each    Use high volume rosalba med saline irrigation. Use warm distilled water and 2 packets of the salt solution that comes with the bottle, dissolve in bottle up to 240 ml lana. Irrigate each side of your nose leaning over the sink, using 1/3 to 1/2 the volume of the bottle in each nostril every irrigation.  Irrigate 5 times daily and as needed.  Add budesonide as indicated    Post-operative state       Vitamin D (Cholecalciferol) 1000 UNITS Tabs      Take 2,000 Units by mouth        vitamin D 50202 UNIT capsule    ERGOCALCIFEROL     Take 2,000 Units by mouth daily        * Notice:  This list has 2 medication(s) that are the same as other medications prescribed for you. Read the directions carefully, and ask your doctor or other care provider to review them with you.

## 2018-01-11 NOTE — PROGRESS NOTES
"Post Op Sinus 2    HPI - Sydni Isabel is here for their second postoperative visit, status post endoscopic sinus surgery (with septoplasty and turbinate reduction) performed on 12/20.  There was the expected amount of congestion which has now mostly resolved, and no bleeding has occurred.  The generalized facial pressure has been resolving, and there have been no fevers or chills since the first postoperative visit.  The budesonide sinus rinses are continuing two times per day, and are being tolerated well.  No visual changes.    PROCEDURES PERFORMED:   1.  Bilateral endoscopic frontal sinusotomy  2.  Bilateral total ethmoidectomy  3.  Bilateral maxillary antrostomy with tissue removal   4.  Septoplasty with cartilage reinsertion  5.  Repair left nasal valve using septal cartilage graft  6.  Bilateral submucosal inferior turbinate reduction    Sinus procedures above performed using [employer]  navigation.    Physical Exam -   /70 (Cuff Size: Adult Regular)  Pulse 76  Temp 97.9  F (36.6  C) (Tympanic)  Ht 1.651 m (5' 5\")  Wt 78.9 kg (174 lb)  BMI 28.96 kg/m2  General - The patient is awake and alert, and answers questions appropriately during the history and physical.  The vocal quality is hypernasal, but there is no dyspnea or stridor noted.  Eyes - The EOMI, there is no conjuncitval or scleral injection.  Pupils are equally round and reactive to light.  Oral - The oral mucosa is pink and moist.  The tongue is mobile and midline on protrusion, no edema noted.  Nasal -   The nasal left nasal valve is well supported, expected fullness left ala and supratip, no external nasal valve collapse      To evaluate the nose and sinuses in the post operative state, I performed rigid nasal endoscopy. The LPN had previously sprayed both nares with lidocaine and neosynephrine.    I began with the LEFT side using a 0 degree rigid nasal endoscope, and then similarly examined the RIGHT side    Findings:  Inferior " turbinates:  reduced  Middle turbinate and middle meatus:  No purulence, no polyposis, no synechiae  Antrostomy patent  Ethmoid cavity portion visible is clear  Mucosa is  healthy throughout without polyps nor polypoid degeneration    A/P -   Continue Budesonide Rinses Twice Daily for 1 month  After 1 month, use plain Pa Med Saline Once Daily Indefinitely  Continue Flonase as prescribed  In 1 month, start to gently massage the left side of your nose  Follow up with ENT as needed  She looks good and is thankful  Post op nasal photos taken

## 2018-01-11 NOTE — LETTER
"    1/11/2018         RE: Sydni Isabel  [address] [place] [facility] [address]        Dear Colleague,    Thank you for referring your patient, Sydni Isabel, to the [facility]. Please see a copy of my visit note below.    Post Op Sinus 2    HPI - Sydni Isabel is here for their second postoperative visit, status post endoscopic sinus surgery (with septoplasty and turbinate reduction) performed on 12/20.  There was the expected amount of congestion which has now mostly resolved, and no bleeding has occurred.  The generalized facial pressure has been resolving, and there have been no fevers or chills since the first postoperative visit.  The budesonide sinus rinses are continuing two times per day, and are being tolerated well.  No visual changes.    PROCEDURES PERFORMED:   1.  Bilateral endoscopic frontal sinusotomy  2.  Bilateral total ethmoidectomy  3.  Bilateral maxillary antrostomy with tissue removal   4.  Septoplasty with cartilage reinsertion  5.  Repair left nasal valve using septal cartilage graft  6.  Bilateral submucosal inferior turbinate reduction    Sinus procedures above performed using [employer]  navigation.    Physical Exam -   /70 (Cuff Size: Adult Regular)  Pulse 76  Temp 97.9  F (36.6  C) (Tympanic)  Ht 1.651 m (5' 5\")  Wt 78.9 kg (174 lb)  BMI 28.96 kg/m2  General - The patient is awake and alert, and answers questions appropriately during the history and physical.  The vocal quality is hypernasal, but there is no dyspnea or stridor noted.  Eyes - The EOMI, there is no conjuncitval or scleral injection.  Pupils are equally round and reactive to light.  Oral - The oral mucosa is pink and moist.  The tongue is mobile and midline on protrusion, no edema noted.  Nasal -   The nasal left nasal valve is well supported, expected fullness left ala and supratip, no external nasal valve collapse      To evaluate the nose and sinuses in the post operative state, I performed " rigid nasal endoscopy. The LPN had previously sprayed both nares with lidocaine and neosynephrine.    I began with the LEFT side using a 0 degree rigid nasal endoscope, and then similarly examined the RIGHT side    Findings:  Inferior turbinates:  reduced  Middle turbinate and middle meatus:  No purulence, no polyposis, no synechiae  Antrostomy patent  Ethmoid cavity portion visible is clear  Mucosa is  healthy throughout without polyps nor polypoid degeneration    A/P -   Continue Budesonide Rinses Twice Daily for 1 month  After 1 month, use plain Pa Med Saline Once Daily Indefinitely  Continue Flonase as prescribed  In 1 month, start to gently massage the left side of your nose  Follow up with ENT as needed  She looks good and is thankful  Post op nasal photos taken    Again, thank you for allowing me to participate in the care of your patient.        Sincerely,        Yulisa Quiroz MD

## 2018-01-11 NOTE — NURSING NOTE
"Chief Complaint   Patient presents with     Surgical Followup     S/P FESS, Septoplasty, Turbinate Reduction, Left Nasal Valve Repair 12/20/17       Initial /70 (Cuff Size: Adult Regular)  Pulse 76  Temp 97.9  F (36.6  C) (Tympanic)  Ht 1.651 m (5' 5\")  Wt 78.9 kg (174 lb)  BMI 28.96 kg/m2 Estimated body mass index is 28.96 kg/(m^2) as calculated from the following:    Height as of this encounter: 1.651 m (5' 5\").    Weight as of this encounter: 78.9 kg (174 lb).  Medication Reconciliation: complete   Patrizia Cool      "

## 2018-01-11 NOTE — PATIENT INSTRUCTIONS
Thank you for allowing Dr. Quiroz and our ENT team to participate in your care.  If your medications are too expensive, please give the nurse a call.  We can possibly change this medication.  If you have a scheduling or an appointment question please contact [facility] Coordinator at their direct line [phone].   ALL nursing questions or concerns can be directed to your ENT nurse at: [phone] - Patrizia    Continue Budesonide Rinses Twice Daily for 1 month  After 1 month, use plain Pa Med Saline Once Daily Indefinitely  Continue Flonase as prescribed  In 1 month, start to gently massage the left side of your nose  Follow up with ENT as needed

## 2018-01-24 ENCOUNTER — ALLIED HEALTH/NURSE VISIT (OUTPATIENT)
Dept: ALLERGY | Facility: OTHER | Age: 38
End: 2018-01-24
Attending: PHYSICIAN ASSISTANT

## 2018-01-24 DIAGNOSIS — J30.1 ALLERGIC RHINITIS DUE TO POLLEN: Primary | ICD-10-CM

## 2018-01-24 PROCEDURE — 95115 IMMUNOTHERAPY ONE INJECTION: CPT

## 2018-01-24 NOTE — MR AVS SNAPSHOT
After Visit Summary   1/24/2018    Sydni Isabel    MRN: [id number]           Patient Information     Date Of Birth          1980        Visit Information        Provider Department      1/24/2018 3:15 PM [facility]        Today's Diagnoses     Allergic rhinitis due to pollen    -  1       Follow-ups after your visit        Your next 10 appointments already scheduled     Jan 31, 2018  2:45 PM CST   injection with HC SHOT ROOM   [facility] [place] ([facility] [place] )    [address]  [place] [address]   [phone]              Who to contact     If you have questions or need follow up information about today's clinic visit or your schedule please contact [facility] directly at [phone].  Normal or non-critical lab and imaging results will be communicated to you by MyChart, letter or phone within 4 business days after the clinic has received the results. If you do not hear from us within 7 days, please contact the clinic through MyChart or phone. If you have a critical or abnormal lab result, we will notify you by phone as soon as possible.  Submit refill requests through [employer] or call your pharmacy and they will forward the refill request to us. Please allow 3 business days for your refill to be completed.          Additional Information About Your Visit        MyChart Information     [employer] gives you secure access to your electronic health record. If you see a primary care provider, you can also send messages to your care team and make appointments. If you have questions, please call your primary care clinic.  If you do not have a primary care provider, please call [phone] and they will assist you.        Care EveryWhere ID     This is your Care EveryWhere ID. This could be used by other organizations to access your [facility] medical records  [id number]         Blood Pressure from Last 3 Encounters:   01/11/18  112/70   12/27/17 114/73   12/20/17 115/77    Weight from Last 3 Encounters:   01/11/18 174 lb (78.9 kg)   12/27/17 174 lb (78.9 kg)   12/20/17 174 lb (78.9 kg)              We Performed the Following     Allergy Shot: One injection        Primary Care Provider Office Phone # Fax #    Go LAUREL Villarreal [phone]       [facility] [address] [facility] [address]        Equal Access to Services     [facility]: Hadii aad ku hadasho Soomaali, waaxda luqadaha, qaybta kaalmada adeegyada, waxay idiin hayaan adeeg luciaaraerica maier . So [facility] [phone].    ATENCIÓN: Si habla español, tiene a suero disposición servicios gratuitos de asistencia lingüística. [facility] [phone].    We comply with applicable federal civil rights laws and [place] laws. We do not discriminate on the basis of race, color, national origin, age, disability, sex, sexual orientation, or gender identity.            Thank you!     Thank you for choosing [facility]  for your care. Our goal is always to provide you with excellent care. Hearing back from our patients is one way we can continue to improve our services. Please take a few minutes to complete the written survey that you may receive in the mail after your visit with us. Thank you!             Your Updated Medication List - Protect others around you: Learn how to safely use, store and throw away your medicines at www.disposemymeds.org.          This list is accurate as of 1/24/18  4:26 PM.  Always use your most recent med list.                   Brand Name Dispense Instructions for use Diagnosis    albuterol 108 (90 BASE) MCG/ACT Inhaler    PROAIR HFA    1 Inhaler    Inhale 1 puff into the lungs 3 times daily For 3-5 days.        ALLERGEN IMMUNOTHERAPY PRESCRIPTION     5 mL    Proceed with SCIT per standard buildup protocol.    Perennial allergic rhinitis       azelastine-fluticasone 137-50 MCG/ACT nasal spray    DYMISTA    2 Bottle    Spray 1 spray  into both nostrils 2 times daily    Perennial allergic rhinitis with seasonal variation, Seasonal allergic rhinitis, unspecified allergic rhinitis trigger       budesonide 0.5 MG/2ML neb solution    PULMICORT    3 Box    Squirt entire vial into previously made rosalba med saline bottle, mix, and irrigate each nostril until entire bottle empty.  Do this twice daily.    Post-operative state       budesonide-formoterol 160-4.5 MCG/ACT Inhaler    SYMBICORT     Inhale 2 puffs into the lungs 2 times daily        dexlansoprazole 60 MG Cpdr CR capsule    DEXILANT     Take 60 mg by mouth daily        EPINEPHrine 0.3 MG/0.3ML injection    EPIPEN    2 each    Inject 0.3 mLs into the muscle once as needed for anaphylaxis for 1 dose.    Chronic rhinitis       fluticasone 50 MCG/ACT spray    FLONASE    16 g    Spray 2 sprays into both nostrils daily    Chronic rhinitis       loratadine 10 MG tablet    CLARITIN    30 tablet    TAKE 1 TABLET DAILY.    Allergic rhinitis due to pollen, Seasonal allergic rhinitis, Perennial allergic rhinitis with seasonal variation       montelukast 10 MG tablet    SINGULAIR    90 tablet    TAKE ONE TABLET AT BEDTIME.    Perennial allergic rhinitis with seasonal variation, Seasonal allergic rhinitis, Allergic rhinitis due to pollen       * multivitamin, therapeutic with minerals Tabs tablet      Take 1 tablet by mouth daily        * Multi-vitamin Tabs tablet           pantoprazole sodium 40 MG packet    PROTONIX     Take 40 mg by mouth        sinus rinse bottle     1 each    Use high volume rosalba med saline irrigation. Use warm distilled water and 2 packets of the salt solution that comes with the bottle, dissolve in bottle up to 240 ml lana. Irrigate each side of your nose leaning over the sink, using 1/3 to 1/2 the volume of the bottle in each nostril every irrigation.  Irrigate 5 times daily and as needed.  Add budesonide as indicated    Post-operative state       Vitamin D (Cholecalciferol) 1000 UNITS  Tabs      Take 2,000 Units by mouth        vitamin D 15672 UNIT capsule    ERGOCALCIFEROL     Take 2,000 Units by mouth daily        * Notice:  This list has 2 medication(s) that are the same as other medications prescribed for you. Read the directions carefully, and ask your doctor or other care provider to review them with you.

## 2018-01-24 NOTE — PROGRESS NOTES
Prior to injection verified patient identity using patient's name and date of birth.    Allergy injection/s given and charted on paper allergy flow sheet.  Patient left AMA and did not remain for observation. Consent form signed    Nadia Colón LPN

## 2018-01-31 ENCOUNTER — ALLIED HEALTH/NURSE VISIT (OUTPATIENT)
Dept: ALLERGY | Facility: OTHER | Age: 38
End: 2018-01-31
Attending: PHYSICIAN ASSISTANT

## 2018-01-31 DIAGNOSIS — J30.2 SEASONAL ALLERGIC RHINITIS: Primary | ICD-10-CM

## 2018-01-31 PROCEDURE — 95115 IMMUNOTHERAPY ONE INJECTION: CPT

## 2018-01-31 NOTE — MR AVS SNAPSHOT
After Visit Summary   1/31/2018    Sydni Isabel    MRN: [id number]           Patient Information     Date Of Birth          1980        Visit Information        Provider Department      1/31/2018 2:45 PM [facility]OM [facility]        Today's Diagnoses     Seasonal allergic rhinitis    -  1       Follow-ups after your visit        Your next 10 appointments already scheduled     Feb 07, 2018  2:30 PM CST   injection with HC SHOT ROOM   [facility] [place] ([facility] [place] )    [address]  [place] [address]   [phone]            Feb 14, 2018  4:00 PM CST   injection with HC SHOT ROOM   [facility] [place] ([facility] [place] )    [address]  [place] [address]   [phone]            Feb 21, 2018  4:00 PM CST   injection with HC SHOT ROOM   [facility] [place] ([facility] [place] )    [address]  [place] [address]   [phone]              Who to contact     If you have questions or need follow up information about today's clinic visit or your schedule please contact [facility] directly at [phone].  Normal or non-critical lab and imaging results will be communicated to you by MyChart, letter or phone within 4 business days after the clinic has received the results. If you do not hear from us within 7 days, please contact the clinic through [employer] or phone. If you have a critical or abnormal lab result, we will notify you by phone as soon as possible.  Submit refill requests through [facility] or call your pharmacy and they will forward the refill request to us. Please allow 3 business days for your refill to be completed.          Additional Information About Your Visit        MyChart Information     [facility] gives you secure access to your electronic health record. If you see a primary care provider, you can also send messages to your care team and make appointments. If you have  questions, please call your primary care clinic.  If you do not have a primary care provider, please call [phone] and they will assist you.        Care EveryWhere ID     This is your Care EveryWhere ID. This could be used by other organizations to access your [place] medical records  [id number]         Blood Pressure from Last 3 Encounters:   01/11/18 112/70   12/27/17 114/73   12/20/17 115/77    Weight from Last 3 Encounters:   01/11/18 174 lb (78.9 kg)   12/27/17 174 lb (78.9 kg)   12/20/17 174 lb (78.9 kg)              We Performed the Following     Allergy Shot: One injection        Primary Care Provider Office Phone # Fax #    Go Villarreal [phone]       [facility] [address]  [facility] [address]        Equal Access to Services     [facility]: Hadii aad ku hadasho Sojoshuaali, waaxda luqadaha, qaybta kaalmada adeegyada, waxay sanderin hayaan martha maier . So [facility] [phone].    ATENCIÓN: Si habla español, tiene a suero disposición servicios gratuitos de asistencia lingüística. Llame al [phone].    We comply with applicable federal civil rights laws and [place] laws. We do not discriminate on the basis of race, color, national origin, age, disability, sex, sexual orientation, or gender identity.            Thank you!     Thank you for choosing [facility]  for your care. Our goal is always to provide you with excellent care. Hearing back from our patients is one way we can continue to improve our services. Please take a few minutes to complete the written survey that you may receive in the mail after your visit with us. Thank you!             Your Updated Medication List - Protect others around you: Learn how to safely use, store and throw away your medicines at www.disposemymeds.org.          This list is accurate as of 1/31/18  3:56 PM.  Always use your most recent med list.                   Brand Name Dispense Instructions for use  Diagnosis    albuterol 108 (90 BASE) MCG/ACT Inhaler    PROAIR HFA    1 Inhaler    Inhale 1 puff into the lungs 3 times daily For 3-5 days.        ALLERGEN IMMUNOTHERAPY PRESCRIPTION     5 mL    Proceed with SCIT per standard buildup protocol.    Perennial allergic rhinitis       azelastine-fluticasone 137-50 MCG/ACT nasal spray    DYMISTA    2 Bottle    Spray 1 spray into both nostrils 2 times daily    Perennial allergic rhinitis with seasonal variation, Seasonal allergic rhinitis, unspecified allergic rhinitis trigger       budesonide 0.5 MG/2ML neb solution    PULMICORT    3 Box    Squirt entire vial into previously made rosalba med saline bottle, mix, and irrigate each nostril until entire bottle empty.  Do this twice daily.    Post-operative state       budesonide-formoterol 160-4.5 MCG/ACT Inhaler    SYMBICORT     Inhale 2 puffs into the lungs 2 times daily        dexlansoprazole 60 MG Cpdr CR capsule    DEXILANT     Take 60 mg by mouth daily        EPINEPHrine 0.3 MG/0.3ML injection    EPIPEN    2 each    Inject 0.3 mLs into the muscle once as needed for anaphylaxis for 1 dose.    Chronic rhinitis       fluticasone 50 MCG/ACT spray    FLONASE    16 g    Spray 2 sprays into both nostrils daily    Chronic rhinitis       loratadine 10 MG tablet    CLARITIN    30 tablet    TAKE 1 TABLET DAILY.    Allergic rhinitis due to pollen, Seasonal allergic rhinitis, Perennial allergic rhinitis with seasonal variation       montelukast 10 MG tablet    SINGULAIR    90 tablet    TAKE ONE TABLET AT BEDTIME.    Perennial allergic rhinitis with seasonal variation, Seasonal allergic rhinitis, Allergic rhinitis due to pollen       * multivitamin, therapeutic with minerals Tabs tablet      Take 1 tablet by mouth daily        * Multi-vitamin Tabs tablet           pantoprazole sodium 40 MG packet    PROTONIX     Take 40 mg by mouth        sinus rinse bottle     1 each    Use high volume rosalba med saline irrigation. Use warm distilled  water and 2 packets of the salt solution that comes with the bottle, dissolve in bottle up to 240 ml lana. Irrigate each side of your nose leaning over the sink, using 1/3 to 1/2 the volume of the bottle in each nostril every irrigation.  Irrigate 5 times daily and as needed.  Add budesonide as indicated    Post-operative state       Vitamin D (Cholecalciferol) 1000 UNITS Tabs      Take 2,000 Units by mouth        vitamin D 72640 UNIT capsule    ERGOCALCIFEROL     Take 2,000 Units by mouth daily        * Notice:  This list has 2 medication(s) that are the same as other medications prescribed for you. Read the directions carefully, and ask your doctor or other care provider to review them with you.

## 2018-02-07 ENCOUNTER — ALLIED HEALTH/NURSE VISIT (OUTPATIENT)
Dept: ALLERGY | Facility: OTHER | Age: 38
End: 2018-02-07
Attending: PHYSICIAN ASSISTANT

## 2018-02-07 DIAGNOSIS — J30.9 ALLERGIC RHINITIS: Primary | ICD-10-CM

## 2018-02-07 DIAGNOSIS — Z53.9 ERRONEOUS ENCOUNTER--DISREGARD: Primary | ICD-10-CM

## 2018-02-07 PROCEDURE — 95115 IMMUNOTHERAPY ONE INJECTION: CPT

## 2018-02-07 NOTE — MR AVS SNAPSHOT
After Visit Summary   2/7/2018    Sydni Isabel    MRN: [id number]           Patient Information     Date Of Birth          1980        Today's Diagnoses     Allergic rhinitis    -  1       Follow-ups after your visit        Your next 10 appointments already scheduled     Feb 14, 2018  4:00 PM CST   injection with HC SHOT ROOM   [facility] [place] ([facility] [place] )    [address]  [place] [address]   [phone]            Feb 21, 2018  4:00 PM CST   injection with HC SHOT ROOM   [facility] [place] ([facility] [place] )    [address]  [place] [address]   [phone]              Who to contact     If you have questions or need follow up information about today's clinic visit or your schedule please contact [facility] directly at [phone].  Normal or non-critical lab and imaging results will be communicated to you by MyChart, letter or phone within 4 business days after the clinic has received the results. If you do not hear from us within 7 days, please contact the clinic through [employer] or phone. If you have a critical or abnormal lab result, we will notify you by phone as soon as possible.  Submit refill requests through [employer] [facility] or call your pharmacy and they will forward the refill request to us. Please allow 3 business days for your refill to be completed.          Additional Information About Your Visit        MyChart Information     [facility] gives you secure access to your electronic health record. If you see a primary care provider, you can also send messages to your care team and make appointments. If you have questions, please call your primary care clinic.  If you do not have a primary care provider, please call [phone] and they will assist you.        Care EveryWhere ID     This is your Care EveryWhere ID. This could be used by other organizations to access your [place] medical records  [id number]          Blood Pressure from Last 3 Encounters:   01/11/18 112/70   12/27/17 114/73   12/20/17 115/77    Weight from Last 3 Encounters:   01/11/18 174 lb (78.9 kg)   12/27/17 174 lb (78.9 kg)   12/20/17 174 lb (78.9 kg)              We Performed the Following     Allergy Shot: One injection        Primary Care Provider Office Phone # Fax #    Go LAUREL Villarreal [phone]       [facility] [address]  [facility] [address]        Equal Access to Services     [facility]: Hadii aad ku hadasho Soomaali, waaxda luqadaha, qaybta kaalmada adeegyada, susanna amado hayarlynn martha maier . So [facility] [phone].    ATENCIÓN: Si habla español, tiene a suero disposición servicios gratuitos de asistencia lingüística. [facility] [phone].    We comply with applicable federal civil rights laws and [place] laws. We do not discriminate on the basis of race, color, national origin, age, disability, sex, sexual orientation, or gender identity.            Thank you!     Thank you for choosing [facility]  for your care. Our goal is always to provide you with excellent care. Hearing back from our patients is one way we can continue to improve our services. Please take a few minutes to complete the written survey that you may receive in the mail after your visit with us. Thank you!             Your Updated Medication List - Protect others around you: Learn how to safely use, store and throw away your medicines at www.disposemymeds.org.          This list is accurate as of 2/7/18  3:27 PM.  Always use your most recent med list.                   Brand Name Dispense Instructions for use Diagnosis    albuterol 108 (90 BASE) MCG/ACT Inhaler    PROAIR HFA    1 Inhaler    Inhale 1 puff into the lungs 3 times daily For 3-5 days.        ALLERGEN IMMUNOTHERAPY PRESCRIPTION     5 mL    Proceed with SCIT per standard buildup protocol.    Perennial allergic rhinitis       azelastine-fluticasone 137-50 MCG/ACT  nasal spray    DYMISTA    2 Bottle    Spray 1 spray into both nostrils 2 times daily    Perennial allergic rhinitis with seasonal variation, Seasonal allergic rhinitis, unspecified allergic rhinitis trigger       budesonide 0.5 MG/2ML neb solution    PULMICORT    3 Box    Squirt entire vial into previously made rosalba med saline bottle, mix, and irrigate each nostril until entire bottle empty.  Do this twice daily.    Post-operative state       budesonide-formoterol 160-4.5 MCG/ACT Inhaler    SYMBICORT     Inhale 2 puffs into the lungs 2 times daily        dexlansoprazole 60 MG Cpdr CR capsule    DEXILANT     Take 60 mg by mouth daily        EPINEPHrine 0.3 MG/0.3ML injection    EPIPEN    2 each    Inject 0.3 mLs into the muscle once as needed for anaphylaxis for 1 dose.    Chronic rhinitis       fluticasone 50 MCG/ACT spray    FLONASE    16 g    Spray 2 sprays into both nostrils daily    Chronic rhinitis       loratadine 10 MG tablet    CLARITIN    30 tablet    TAKE 1 TABLET DAILY.    Allergic rhinitis due to pollen, Seasonal allergic rhinitis, Perennial allergic rhinitis with seasonal variation       montelukast 10 MG tablet    SINGULAIR    90 tablet    TAKE ONE TABLET AT BEDTIME.    Perennial allergic rhinitis with seasonal variation, Seasonal allergic rhinitis, Allergic rhinitis due to pollen       * multivitamin, therapeutic with minerals Tabs tablet      Take 1 tablet by mouth daily        * Multi-vitamin Tabs tablet           pantoprazole sodium 40 MG packet    PROTONIX     Take 40 mg by mouth        sinus rinse bottle     1 each    Use high volume rosalba med saline irrigation. Use warm distilled water and 2 packets of the salt solution that comes with the bottle, dissolve in bottle up to 240 ml lana. Irrigate each side of your nose leaning over the sink, using 1/3 to 1/2 the volume of the bottle in each nostril every irrigation.  Irrigate 5 times daily and as needed.  Add budesonide as indicated     Post-operative state       Vitamin D (Cholecalciferol) 1000 UNITS Tabs      Take 2,000 Units by mouth        vitamin D 02157 UNIT capsule    ERGOCALCIFEROL     Take 2,000 Units by mouth daily        * Notice:  This list has 2 medication(s) that are the same as other medications prescribed for you. Read the directions carefully, and ask your doctor or other care provider to review them with you.

## 2018-02-07 NOTE — PROGRESS NOTES
Allergy injection given and charted on paper allergy flow sheet.  Patient signed out AMA.    Thuy Herrera

## 2018-02-14 ENCOUNTER — ALLIED HEALTH/NURSE VISIT (OUTPATIENT)
Dept: ALLERGY | Facility: OTHER | Age: 38
End: 2018-02-14
Attending: PHYSICIAN ASSISTANT

## 2018-02-14 DIAGNOSIS — J30.9 ALLERGIC RHINITIS: Primary | ICD-10-CM

## 2018-02-14 PROCEDURE — 95115 IMMUNOTHERAPY ONE INJECTION: CPT

## 2018-02-14 NOTE — MR AVS SNAPSHOT
After Visit Summary   2/14/2018    Sydni Isabel    MRN: [id number]           Patient Information     Date Of Birth          1980        Visit Information        Provider Department      2/14/2018 4:00 PM HC SHOT ROOM [facility]        Today's Diagnoses     Allergic rhinitis    -  1       Follow-ups after your visit        Your next 10 appointments already scheduled     Feb 21, 2018  4:00 PM CST   injection with HC SHOT ROOM   [facility] [place] ([facility] [place] )    [address]  [place] [address]   [phone]              Who to contact     If you have questions or need follow up information about today's clinic visit or your schedule please contact [facility] directly at [phone].  Normal or non-critical lab and imaging results will be communicated to you by [employer], letter or phone within 4 business days after the clinic has received the results. If you do not hear from us within 7 days, please contact the clinic through [employer] or phone. If you have a critical or abnormal lab result, we will notify you by phone as soon as possible.  Submit refill requests through [employer] or call your pharmacy and they will forward the refill request to us. Please allow 3 business days for your refill to be completed.          Additional Information About Your Visit        MyChart Information     [employer] gives you secure access to your electronic health record. If you see a primary care provider, you can also send messages to your care team and make appointments. If you have questions, please call your primary care clinic.  If you do not have a primary care provider, please call [phone] and they will assist you.        Care EveryWhere ID     This is your Care EveryWhere ID. This could be used by other organizations to access your [place] medical records  [id number]         Blood Pressure from Last 3 Encounters:   01/11/18 112/70   12/27/17  114/73   12/20/17 115/77    Weight from Last 3 Encounters:   01/11/18 174 lb (78.9 kg)   12/27/17 174 lb (78.9 kg)   12/20/17 174 lb (78.9 kg)              We Performed the Following     Allergy Shot: One injection        Primary Care Provider Office Phone # Fax #    Go Villarreal [phone]       [facility] [address]  [facility] [address]        Equal Access to Services     [facility]: Hadii aad ku hadasho Soomaali, waaxda luqadaha, qaybta kaalmada adeegyada, waxay idiin hayaan adeeg kharash larandolph . So [facility] [phone].    ATENCIÓN: Si habla español, tiene a suero disposición servicios gratuitos de asistencia lingüística. [facility] [phone].    We comply with applicable federal civil rights laws and [place] laws. We do not discriminate on the basis of race, color, national origin, age, disability, sex, sexual orientation, or gender identity.            Thank you!     Thank you for choosing [facility]  for your care. Our goal is always to provide you with excellent care. Hearing back from our patients is one way we can continue to improve our services. Please take a few minutes to complete the written survey that you may receive in the mail after your visit with us. Thank you!             Your Updated Medication List - Protect others around you: Learn how to safely use, store and throw away your medicines at www.disposemymeds.org.          This list is accurate as of 2/14/18  4:09 PM.  Always use your most recent med list.                   Brand Name Dispense Instructions for use Diagnosis    albuterol 108 (90 BASE) MCG/ACT Inhaler    PROAIR HFA    1 Inhaler    Inhale 1 puff into the lungs 3 times daily For 3-5 days.        ALLERGEN IMMUNOTHERAPY PRESCRIPTION     5 mL    Proceed with SCIT per standard buildup protocol.    Perennial allergic rhinitis       azelastine-fluticasone 137-50 MCG/ACT nasal spray    DYMISTA    2 Bottle    Spray 1 spray into both  nostrils 2 times daily    Perennial allergic rhinitis with seasonal variation, Seasonal allergic rhinitis, unspecified allergic rhinitis trigger       budesonide 0.5 MG/2ML neb solution    PULMICORT    3 Box    Squirt entire vial into previously made rosalba med saline bottle, mix, and irrigate each nostril until entire bottle empty.  Do this twice daily.    Post-operative state       budesonide-formoterol 160-4.5 MCG/ACT Inhaler    SYMBICORT     Inhale 2 puffs into the lungs 2 times daily        dexlansoprazole 60 MG Cpdr CR capsule    DEXILANT     Take 60 mg by mouth daily        EPINEPHrine 0.3 MG/0.3ML injection    EPIPEN    2 each    Inject 0.3 mLs into the muscle once as needed for anaphylaxis for 1 dose.    Chronic rhinitis       fluticasone 50 MCG/ACT spray    FLONASE    16 g    Spray 2 sprays into both nostrils daily    Chronic rhinitis       loratadine 10 MG tablet    CLARITIN    30 tablet    TAKE 1 TABLET DAILY.    Allergic rhinitis due to pollen, Seasonal allergic rhinitis, Perennial allergic rhinitis with seasonal variation       montelukast 10 MG tablet    SINGULAIR    90 tablet    TAKE ONE TABLET AT BEDTIME.    Perennial allergic rhinitis with seasonal variation, Seasonal allergic rhinitis, Allergic rhinitis due to pollen       * multivitamin, therapeutic with minerals Tabs tablet      Take 1 tablet by mouth daily        * Multi-vitamin Tabs tablet           pantoprazole sodium 40 MG packet    PROTONIX     Take 40 mg by mouth        sinus rinse bottle     1 each    Use high volume rosalba med saline irrigation. Use warm distilled water and 2 packets of the salt solution that comes with the bottle, dissolve in bottle up to 240 ml lana. Irrigate each side of your nose leaning over the sink, using 1/3 to 1/2 the volume of the bottle in each nostril every irrigation.  Irrigate 5 times daily and as needed.  Add budesonide as indicated    Post-operative state       Vitamin D (Cholecalciferol) 1000 UNITS Tabs       Take 2,000 Units by mouth        vitamin D 21114 UNIT capsule    ERGOCALCIFEROL     Take 2,000 Units by mouth daily        * Notice:  This list has 2 medication(s) that are the same as other medications prescribed for you. Read the directions carefully, and ask your doctor or other care provider to review them with you.

## 2018-02-14 NOTE — PROGRESS NOTES
Allergy injection given and charted on paper allergy flow sheet.  Patient signed out MEGAN Christie

## 2018-02-21 ENCOUNTER — ALLIED HEALTH/NURSE VISIT (OUTPATIENT)
Dept: ALLERGY | Facility: OTHER | Age: 38
End: 2018-02-21
Attending: PHYSICIAN ASSISTANT

## 2018-02-21 DIAGNOSIS — J30.9 ALLERGIC RHINITIS: Primary | ICD-10-CM

## 2018-02-21 PROCEDURE — 95115 IMMUNOTHERAPY ONE INJECTION: CPT

## 2018-02-21 NOTE — MR AVS SNAPSHOT
After Visit Summary   2/21/2018    Sydni Isabel    MRN: [id number]           Patient Information     Date Of Birth          1980        Visit Information        Provider Department      2/21/2018 4:00 PM HC SHOT ROOM [facility]        Today's Diagnoses     Allergic rhinitis    -  1       Follow-ups after your visit        Who to contact     If you have questions or need follow up information about today's clinic visit or your schedule please contact [facility] directly at [phone].  Normal or non-critical lab and imaging results will be communicated to you by MyChart, letter or phone within 4 business days after the clinic has received the results. If you do not hear from us within 7 days, please contact the clinic through I[employer] or phone. If you have a critical or abnormal lab result, we will notify you by phone as soon as possible.  Submit refill requests through Volt or call your pharmacy and they will forward the refill request to us. Please allow 3 business days for your refill to be completed.          Additional Information About Your Visit        MyChart Information     Volt gives you secure access to your electronic health record. If you see a primary care provider, you can also send messages to your care team and make appointments. If you have questions, please call your primary care clinic.  If you do not have a primary care provider, please call [phone] and they will assist you.        Care EveryWhere ID     This is your Care EveryWhere ID. This could be used by other organizations to access your [facility] medical records  [id number]         Blood Pressure from Last 3 Encounters:   01/11/18 112/70   12/27/17 114/73   12/20/17 115/77    Weight from Last 3 Encounters:   01/11/18 174 lb (78.9 kg)   12/27/17 174 lb (78.9 kg)   12/20/17 174 lb (78.9 kg)              We Performed the Following     Allergy Shot: One injection        Primary Care  Provider Office Phone # Fax #    Go Villarreal [phone]       [facility] [address]  [facility] [address]        Equal Access to Services     HERMINIO JI : Hadii aad ku hadhernano Sojoshuaali, waaxda luqadaha, qaybta kaalmada adeshawnada, susanna arias kentrellrhonda hardwickerica monahan. So [facility] [phone].    ATENCIÓN: Si habla español, tiene a suero disposición servicios gratuitos de asistencia lingüística. Llame al [phone].    We comply with applicable federal civil rights laws and [place] laws. We do not discriminate on the basis of race, color, national origin, age, disability, sex, sexual orientation, or gender identity.            Thank you!     Thank you for choosing [facility]  for your care. Our goal is always to provide you with excellent care. Hearing back from our patients is one way we can continue to improve our services. Please take a few minutes to complete the written survey that you may receive in the mail after your visit with us. Thank you!             Your Updated Medication List - Protect others around you: Learn how to safely use, store and throw away your medicines at www.disposemymeds.org.          This list is accurate as of 2/21/18  4:14 PM.  Always use your most recent med list.                   Brand Name Dispense Instructions for use Diagnosis    albuterol 108 (90 BASE) MCG/ACT Inhaler    PROAIR HFA    1 Inhaler    Inhale 1 puff into the lungs 3 times daily For 3-5 days.        ALLERGEN IMMUNOTHERAPY PRESCRIPTION     5 mL    Proceed with SCIT per standard buildup protocol.    Perennial allergic rhinitis       azelastine-fluticasone 137-50 MCG/ACT nasal spray    DYMISTA    2 Bottle    Spray 1 spray into both nostrils 2 times daily    Perennial allergic rhinitis with seasonal variation, Seasonal allergic rhinitis, unspecified allergic rhinitis trigger       budesonide 0.5 MG/2ML neb solution    PULMICORT    3 Box    Squirt entire vial into  previously made rosalba med saline bottle, mix, and irrigate each nostril until entire bottle empty.  Do this twice daily.    Post-operative state       budesonide-formoterol 160-4.5 MCG/ACT Inhaler    SYMBICORT     Inhale 2 puffs into the lungs 2 times daily        dexlansoprazole 60 MG Cpdr CR capsule    DEXILANT     Take 60 mg by mouth daily        EPINEPHrine 0.3 MG/0.3ML injection    EPIPEN    2 each    Inject 0.3 mLs into the muscle once as needed for anaphylaxis for 1 dose.    Chronic rhinitis       fluticasone 50 MCG/ACT spray    FLONASE    16 g    Spray 2 sprays into both nostrils daily    Chronic rhinitis       loratadine 10 MG tablet    CLARITIN    30 tablet    TAKE 1 TABLET DAILY.    Allergic rhinitis due to pollen, Seasonal allergic rhinitis, Perennial allergic rhinitis with seasonal variation       montelukast 10 MG tablet    SINGULAIR    90 tablet    TAKE ONE TABLET AT BEDTIME.    Perennial allergic rhinitis with seasonal variation, Seasonal allergic rhinitis, Allergic rhinitis due to pollen       * multivitamin, therapeutic with minerals Tabs tablet      Take 1 tablet by mouth daily        * Multi-vitamin Tabs tablet           pantoprazole sodium 40 MG packet    PROTONIX     Take 40 mg by mouth        sinus rinse bottle     1 each    Use high volume orsalba med saline irrigation. Use warm distilled water and 2 packets of the salt solution that comes with the bottle, dissolve in bottle up to 240 ml lana. Irrigate each side of your nose leaning over the sink, using 1/3 to 1/2 the volume of the bottle in each nostril every irrigation.  Irrigate 5 times daily and as needed.  Add budesonide as indicated    Post-operative state       Vitamin D (Cholecalciferol) 1000 UNITS Tabs      Take 2,000 Units by mouth        vitamin D 40937 UNIT capsule    ERGOCALCIFEROL     Take 2,000 Units by mouth daily        * Notice:  This list has 2 medication(s) that are the same as other medications prescribed for you. Read the  directions carefully, and ask your doctor or other care provider to review them with you.

## 2018-03-07 ENCOUNTER — ALLIED HEALTH/NURSE VISIT (OUTPATIENT)
Dept: ALLERGY | Facility: OTHER | Age: 38
End: 2018-03-07
Attending: PHYSICIAN ASSISTANT
Payer: COMMERCIAL

## 2018-03-07 DIAGNOSIS — J30.2 SEASONAL ALLERGIC RHINITIS, UNSPECIFIED CHRONICITY, UNSPECIFIED TRIGGER: Primary | ICD-10-CM

## 2018-03-07 PROCEDURE — 95165 ANTIGEN THERAPY SERVICES: CPT | Performed by: PHYSICIAN ASSISTANT

## 2018-03-07 NOTE — MR AVS SNAPSHOT
After Visit Summary   3/7/2018    Sydni Isabel    MRN: [id number]           Patient Information     Date Of Birth          1980        Visit Information        Provider Department      3/7/2018 4:15 PM HC SHOT ROOM [facility]        Today's Diagnoses     Seasonal allergic rhinitis, unspecified chronicity, unspecified trigger    -  1       Follow-ups after your visit        Who to contact     If you have questions or need follow up information about today's clinic visit or your schedule please contact [facility] directly at [phone].  Normal or non-critical lab and imaging results will be communicated to you by [employer], letter or phone within 4 business days after the clinic has received the results. If you do not hear from us within 7 days, please contact the clinic through Mob[employer] or phone. If you have a critical or abnormal lab result, we will notify you by phone as soon as possible.  Submit refill requests through [employer] or call your pharmacy and they will forward the refill request to us. Please allow 3 business days for your refill to be completed.          Additional Information About Your Visit        MyChart Information     [employer] gives you secure access to your electronic health record. If you see a primary care provider, you can also send messages to your care team and make appointments. If you have questions, please call your primary care clinic.  If you do not have a primary care provider, please call [phone] and they will assist you.        Care EveryWhere ID     This is your Care EveryWhere ID. This could be used by other organizations to access your [place] medical records  [id number]         Blood Pressure from Last 3 Encounters:   01/11/18 112/70   12/27/17 114/73   12/20/17 115/77    Weight from Last 3 Encounters:   01/11/18 174 lb (78.9 kg)   12/27/17 174 lb (78.9 kg)   12/20/17 174 lb (78.9 kg)              Today, you had the  following     No orders found for display       Primary Care Provider Office Phone # Fax #    Go Villarreal [phone]       [facility] [address]  [facility] [address]        Equal Access to Services     HERMINIO JI : Hadii aad ku hadhernano Sojoshuaali, waaxda luqadaha, qaybta kaalmada adeegyada, waxloulou sanderin hayaan kentrellrhonda valenzuela livier monahan. So [facility] [phone].    ATENCIÓN: Si habla español, tiene a suero disposición servicios gratuitos de asistencia lingüística. Llame al [phone].    We comply with applicable federal civil rights laws and [place] laws. We do not discriminate on the basis of race, color, national origin, age, disability, sex, sexual orientation, or gender identity.            Thank you!     Thank you for choosing [facility]  for your care. Our goal is always to provide you with excellent care. Hearing back from our patients is one way we can continue to improve our services. Please take a few minutes to complete the written survey that you may receive in the mail after your visit with us. Thank you!             Your Updated Medication List - Protect others around you: Learn how to safely use, store and throw away your medicines at www.disposemymeds.org.          This list is accurate as of 3/7/18  4:49 PM.  Always use your most recent med list.                   Brand Name Dispense Instructions for use Diagnosis    albuterol 108 (90 BASE) MCG/ACT Inhaler    PROAIR HFA    1 Inhaler    Inhale 1 puff into the lungs 3 times daily For 3-5 days.        ALLERGEN IMMUNOTHERAPY PRESCRIPTION     5 mL    Proceed with SCIT per standard buildup protocol.    Perennial allergic rhinitis       azelastine-fluticasone 137-50 MCG/ACT nasal spray    DYMISTA    2 Bottle    Spray 1 spray into both nostrils 2 times daily    Perennial allergic rhinitis with seasonal variation, Seasonal allergic rhinitis, unspecified allergic rhinitis trigger       budesonide 0.5 MG/2ML neb  solution    PULMICORT    3 Box    Squirt entire vial into previously made rosalba med saline bottle, mix, and irrigate each nostril until entire bottle empty.  Do this twice daily.    Post-operative state       budesonide-formoterol 160-4.5 MCG/ACT Inhaler    SYMBICORT     Inhale 2 puffs into the lungs 2 times daily        dexlansoprazole 60 MG Cpdr CR capsule    DEXILANT     Take 60 mg by mouth daily        EPINEPHrine 0.3 MG/0.3ML injection    EPIPEN    2 each    Inject 0.3 mLs into the muscle once as needed for anaphylaxis for 1 dose.    Chronic rhinitis       fluticasone 50 MCG/ACT spray    FLONASE    16 g    Spray 2 sprays into both nostrils daily    Chronic rhinitis       loratadine 10 MG tablet    CLARITIN    30 tablet    TAKE 1 TABLET DAILY.    Allergic rhinitis due to pollen, Seasonal allergic rhinitis, Perennial allergic rhinitis with seasonal variation       montelukast 10 MG tablet    SINGULAIR    90 tablet    TAKE ONE TABLET AT BEDTIME.    Perennial allergic rhinitis with seasonal variation, Seasonal allergic rhinitis, Allergic rhinitis due to pollen       * multivitamin, therapeutic with minerals Tabs tablet      Take 1 tablet by mouth daily        * Multi-vitamin Tabs tablet           pantoprazole sodium 40 MG packet    PROTONIX     Take 40 mg by mouth        sinus rinse bottle     1 each    Use high volume rosalba med saline irrigation. Use warm distilled water and 2 packets of the salt solution that comes with the bottle, dissolve in bottle up to 240 ml lana. Irrigate each side of your nose leaning over the sink, using 1/3 to 1/2 the volume of the bottle in each nostril every irrigation.  Irrigate 5 times daily and as needed.  Add budesonide as indicated    Post-operative state       Vitamin D (Cholecalciferol) 1000 UNITS Tabs      Take 2,000 Units by mouth        vitamin D 89408 UNIT capsule    ERGOCALCIFEROL     Take 2,000 Units by mouth daily        * Notice:  This list has 2 medication(s) that are the  same as other medications prescribed for you. Read the directions carefully, and ask your doctor or other care provider to review them with you.

## 2018-03-09 ENCOUNTER — MED INFO FORMS (OUTPATIENT)
Dept: FAMILY MEDICINE | Age: 38
End: 2018-03-09

## 2018-03-16 ENCOUNTER — ALLIED HEALTH/NURSE VISIT (OUTPATIENT)
Dept: ALLERGY | Facility: OTHER | Age: 38
End: 2018-03-16
Attending: PHYSICIAN ASSISTANT
Payer: COMMERCIAL

## 2018-03-16 ENCOUNTER — TELEPHONE (OUTPATIENT)
Dept: ALLERGY | Facility: OTHER | Age: 38
End: 2018-03-16

## 2018-03-16 DIAGNOSIS — J30.1 ALLERGIC RHINITIS DUE TO POLLEN, UNSPECIFIED CHRONICITY, UNSPECIFIED SEASONALITY: ICD-10-CM

## 2018-03-16 DIAGNOSIS — J30.2 SEASONAL ALLERGIC RHINITIS, UNSPECIFIED CHRONICITY, UNSPECIFIED TRIGGER: Primary | ICD-10-CM

## 2018-03-16 DIAGNOSIS — J30.89 PERENNIAL ALLERGIC RHINITIS WITH SEASONAL VARIATION: ICD-10-CM

## 2018-03-16 DIAGNOSIS — J30.2 PERENNIAL ALLERGIC RHINITIS WITH SEASONAL VARIATION: ICD-10-CM

## 2018-03-16 DIAGNOSIS — J30.2 SEASONAL ALLERGIC RHINITIS, UNSPECIFIED CHRONICITY, UNSPECIFIED TRIGGER: ICD-10-CM

## 2018-03-16 RX ORDER — CETIRIZINE HYDROCHLORIDE 10 MG/1
10 TABLET ORAL EVERY EVENING
Qty: 30 TABLET | Refills: 5 | Status: CANCELLED | OUTPATIENT
Start: 2018-03-16

## 2018-03-16 RX ORDER — CETIRIZINE HYDROCHLORIDE 10 MG/1
10 TABLET ORAL EVERY EVENING
Qty: 30 TABLET | Refills: 1 | Status: SHIPPED | OUTPATIENT
Start: 2018-03-16

## 2018-03-16 NOTE — TELEPHONE ENCOUNTER
Patient failed SVT x 2 weeks in a row.  Attempted to call patient with directions from Martina Dominguez to start Zyrtec daily at HS and to repeat SVT in 1 week.  Left message with patient to call back.

## 2018-03-16 NOTE — PROGRESS NOTES
Prior to injection verified patient identity using patient's name and date of birth.    Safety vial test was done in the left arm, for new blue vial # 1.   Administered  0.01ml (ID) for SVT.  SVT = 20 mm.  Fail    Will notify Martina Ruesmer/ PAC and notify patient with recommendations.     Thuy Mitchell RN

## 2018-03-16 NOTE — TELEPHONE ENCOUNTER
Claritin in AM and Zyrtec PM. Take for 1 week and return for SVT. If no improvement, will likely need repeat of gold vial. TR

## 2018-03-16 NOTE — MR AVS SNAPSHOT
After Visit Summary   3/16/2018    Sydni Isabel    MRN: [id number]           Patient Information     Date Of Birth          1980        Visit Information        Provider Department      3/16/2018 9:45 AM HC SHOT ROOM [place] [facility]        Today's Diagnoses     Seasonal allergic rhinitis, unspecified chronicity, unspecified trigger    -  1       Follow-ups after your visit        Who to contact     If you have questions or need follow up information about today's clinic visit or your schedule please contact [facility] directly at [phone].  Normal or non-critical lab and imaging results will be communicated to you by Life[employer]hart, letter or phone within 4 business days after the clinic has received the results. If you do not hear from us within 7 days, please contact the clinic through [employer] or phone. If you have a critical or abnormal lab result, we will notify you by phone as soon as possible.  Submit refill requests through [email or web address] or call your pharmacy and they will forward the refill request to us. Please allow 3 business days for your refill to be completed.          Additional Information About Your Visit        MyChart Information     [email or web address] gives you secure access to your electronic health record. If you see a primary care provider, you can also send messages to your care team and make appointments. If you have questions, please call your primary care clinic.  If you do not have a primary care provider, please call [phone] and they will assist you.        Care EveryWhere ID     This is your Care EveryWhere ID. This could be used by other organizations to access your [place] medical records  [id number]         Blood Pressure from Last 3 Encounters:   01/11/18 112/70   12/27/17 114/73   12/20/17 115/77    Weight from Last 3 Encounters:   01/11/18 174 lb (78.9 kg)   12/27/17 174 lb (78.9 kg)   12/20/17 174 lb (78.9 kg)              Today, you had the  following     No orders found for display       Primary Care Provider Office Phone # Fax #    Go Villarreal [phone]       [facility] [address]  [facility] [address]        Equal Access to Services     HERMINIO JI : Hadii aad ku hadhernano Sojoshuaali, waaxda luqadaha, qaybta kaalmada adeegyada, waxloulou sanfordn kentrellrhonda valenzuela livier monahan. So [facility] [phone].    ATENCIÓN: Si habla español, tiene a suero disposición servicios gratuitos de asistencia lingüística. Llame al [phone].    We comply with applicable federal civil rights laws and [place] laws. We do not discriminate on the basis of race, color, national origin, age, disability, sex, sexual orientation, or gender identity.            Thank you!     Thank you for choosing [facility]  for your care. Our goal is always to provide you with excellent care. Hearing back from our patients is one way we can continue to improve our services. Please take a few minutes to complete the written survey that you may receive in the mail after your visit with us. Thank you!             Your Updated Medication List - Protect others around you: Learn how to safely use, store and throw away your medicines at www.disposemymeds.org.          This list is accurate as of 3/16/18 10:06 AM.  Always use your most recent med list.                   Brand Name Dispense Instructions for use Diagnosis    albuterol 108 (90 BASE) MCG/ACT Inhaler    PROAIR HFA    1 Inhaler    Inhale 1 puff into the lungs 3 times daily For 3-5 days.        ALLERGEN IMMUNOTHERAPY PRESCRIPTION     5 mL    Proceed with SCIT per standard buildup protocol.    Perennial allergic rhinitis       azelastine-fluticasone 137-50 MCG/ACT nasal spray    DYMISTA    2 Bottle    Spray 1 spray into both nostrils 2 times daily    Perennial allergic rhinitis with seasonal variation, Seasonal allergic rhinitis, unspecified allergic rhinitis trigger       budesonide 0.5 MG/2ML neb  solution    PULMICORT    3 Box    Squirt entire vial into previously made rosalba med saline bottle, mix, and irrigate each nostril until entire bottle empty.  Do this twice daily.    Post-operative state       budesonide-formoterol 160-4.5 MCG/ACT Inhaler    SYMBICORT     Inhale 2 puffs into the lungs 2 times daily        dexlansoprazole 60 MG Cpdr CR capsule    DEXILANT     Take 60 mg by mouth daily        EPINEPHrine 0.3 MG/0.3ML injection    EPIPEN    2 each    Inject 0.3 mLs into the muscle once as needed for anaphylaxis for 1 dose.    Chronic rhinitis       fluticasone 50 MCG/ACT spray    FLONASE    16 g    Spray 2 sprays into both nostrils daily    Chronic rhinitis       loratadine 10 MG tablet    CLARITIN    30 tablet    TAKE 1 TABLET DAILY.    Allergic rhinitis due to pollen, Seasonal allergic rhinitis, Perennial allergic rhinitis with seasonal variation       montelukast 10 MG tablet    SINGULAIR    90 tablet    TAKE ONE TABLET AT BEDTIME.    Perennial allergic rhinitis with seasonal variation, Seasonal allergic rhinitis, Allergic rhinitis due to pollen       * multivitamin, therapeutic with minerals Tabs tablet      Take 1 tablet by mouth daily        * Multi-vitamin Tabs tablet           pantoprazole sodium 40 MG packet    PROTONIX     Take 40 mg by mouth        sinus rinse bottle     1 each    Use high volume rosalba med saline irrigation. Use warm distilled water and 2 packets of the salt solution that comes with the bottle, dissolve in bottle up to 240 ml lana. Irrigate each side of your nose leaning over the sink, using 1/3 to 1/2 the volume of the bottle in each nostril every irrigation.  Irrigate 5 times daily and as needed.  Add budesonide as indicated    Post-operative state       Vitamin D (Cholecalciferol) 1000 UNITS Tabs      Take 2,000 Units by mouth        vitamin D 32255 UNIT capsule    ERGOCALCIFEROL     Take 2,000 Units by mouth daily        * Notice:  This list has 2 medication(s) that are the  same as other medications prescribed for you. Read the directions carefully, and ask your doctor or other care provider to review them with you.

## 2018-04-09 ENCOUNTER — ALLIED HEALTH/NURSE VISIT (OUTPATIENT)
Dept: ALLERGY | Facility: OTHER | Age: 38
End: 2018-04-09
Attending: PHYSICIAN ASSISTANT
Payer: COMMERCIAL

## 2018-04-09 DIAGNOSIS — J30.9 ALLERGIC RHINITIS: Primary | ICD-10-CM

## 2018-04-09 PROCEDURE — 95115 IMMUNOTHERAPY ONE INJECTION: CPT

## 2018-04-09 NOTE — PROGRESS NOTES
Prior to injection verified patient identity using patient's name and date of birth.    Safety Vial Test was done in  RIght arm for new Blue vial # 1.  Administered 0.01 ml (ID) for SVT.   SVT = 5.    Pass    Allergy injection given and charted on the paper flow sheet.      Per orders of Dr. Martina Dominguez, an allergy injections is given by Thuy Herrera.    The patient signed out AMA.    Thuy Herrera

## 2018-04-09 NOTE — MR AVS SNAPSHOT
After Visit Summary   4/9/2018    Sydni Isabel    MRN: [id number]           Patient Information     Date Of Birth          1980        Visit Information        Provider Department      4/9/2018 3:45 PM HC SHOT ROOM [facility]        Today's Diagnoses     Allergic rhinitis    -  1       Follow-ups after your visit        Who to contact     If you have questions or need follow up information about today's clinic visit or your schedule please contact [facility] directly at [phone].  Normal or non-critical lab and imaging results will be communicated to you by MyChart, letter or phone within 4 business days after the clinic has received the results. If you do not hear from us within 7 days, please contact the clinic through [email or web address] or phone. If you have a critical or abnormal lab result, we will notify you by phone as soon as possible.  Submit refill requests through 5k Fans or call your pharmacy and they will forward the refill request to us. Please allow 3 business days for your refill to be completed.          Additional Information About Your Visit        MyChart Information     5[employer] Fans gives you secure access to your electronic health record. If you see a primary care provider, you can also send messages to your care team and make appointments. If you have questions, please call your primary care clinic.  If you do not have a primary care provider, please call [phone] and they will assist you.        Care EveryWhere ID     This is your Care EveryWhere ID. This could be used by other organizations to access your [facility] medical records  [id number]         Blood Pressure from Last 3 Encounters:   01/11/18 112/70   12/27/17 114/73   12/20/17 115/77    Weight from Last 3 Encounters:   01/11/18 174 lb (78.9 kg)   12/27/17 174 lb (78.9 kg)   12/20/17 174 lb (78.9 kg)              We Performed the Following     Allergy Shot: One injection        Primary Care  Provider Office Phone # Fax #    Go Villarreal [phone]       [facility] [address]  [facility] [address]        Equal Access to Services     HERMINIO JI : Hadii aad ku hadhernano Sojoshuaali, waaxda luqadaha, qaybta kaalmada adeshawnada, susanna arias kentrellrhonda hardwickerica monahan. So [facility] [phone].    ATENCIÓN: Si habla español, tiene a suero disposición servicios gratuitos de asistencia lingüística. Llame al [phone].    We comply with applicable federal civil rights laws and [place] laws. We do not discriminate on the basis of race, color, national origin, age, disability, sex, sexual orientation, or gender identity.            Thank you!     Thank you for choosing [facility]3]  for your care. Our goal is always to provide you with excellent care. Hearing back from our patients is one way we can continue to improve our services. Please take a few minutes to complete the written survey that you may receive in the mail after your visit with us. Thank you!             Your Updated Medication List - Protect others around you: Learn how to safely use, store and throw away your medicines at www.disposemymeds.org.          This list is accurate as of 4/9/18  4:24 PM.  Always use your most recent med list.                   Brand Name Dispense Instructions for use Diagnosis    albuterol 108 (90 BASE) MCG/ACT Inhaler    PROAIR HFA    1 Inhaler    Inhale 1 puff into the lungs 3 times daily For 3-5 days.        ALLERGEN IMMUNOTHERAPY PRESCRIPTION     5 mL    Proceed with SCIT per standard buildup protocol.    Perennial allergic rhinitis       azelastine-fluticasone 137-50 MCG/ACT nasal spray    DYMISTA    2 Bottle    Spray 1 spray into both nostrils 2 times daily    Perennial allergic rhinitis with seasonal variation, Seasonal allergic rhinitis, unspecified allergic rhinitis trigger       budesonide 0.5 MG/2ML neb solution    PULMICORT    3 Box    Squirt entire vial into  previously made rosalba med saline bottle, mix, and irrigate each nostril until entire bottle empty.  Do this twice daily.    Post-operative state       budesonide-formoterol 160-4.5 MCG/ACT Inhaler    SYMBICORT     Inhale 2 puffs into the lungs 2 times daily        cetirizine 10 MG tablet    zyrTEC    30 tablet    Take 1 tablet (10 mg) by mouth every evening    Allergic rhinitis due to pollen, unspecified chronicity, unspecified seasonality, Seasonal allergic rhinitis, unspecified chronicity, unspecified trigger, Perennial allergic rhinitis with seasonal variation       dexlansoprazole 60 MG Cpdr CR capsule    DEXILANT     Take 60 mg by mouth daily        EPINEPHrine 0.3 MG/0.3ML injection    EPIPEN    2 each    Inject 0.3 mLs into the muscle once as needed for anaphylaxis for 1 dose.    Chronic rhinitis       fluticasone 50 MCG/ACT spray    FLONASE    16 g    Spray 2 sprays into both nostrils daily    Chronic rhinitis       loratadine 10 MG tablet    CLARITIN    30 tablet    TAKE 1 TABLET DAILY.    Allergic rhinitis due to pollen, Seasonal allergic rhinitis, Perennial allergic rhinitis with seasonal variation       montelukast 10 MG tablet    SINGULAIR    90 tablet    TAKE ONE TABLET AT BEDTIME.    Perennial allergic rhinitis with seasonal variation, Seasonal allergic rhinitis, Allergic rhinitis due to pollen       * multivitamin, therapeutic with minerals Tabs tablet      Take 1 tablet by mouth daily        * Multi-vitamin Tabs tablet           pantoprazole sodium 40 MG packet    PROTONIX     Take 40 mg by mouth        sinus rinse bottle     1 each    Use high volume rosalba med saline irrigation. Use warm distilled water and 2 packets of the salt solution that comes with the bottle, dissolve in bottle up to 240 ml lana. Irrigate each side of your nose leaning over the sink, using 1/3 to 1/2 the volume of the bottle in each nostril every irrigation.  Irrigate 5 times daily and as needed.  Add budesonide as indicated     Post-operative state       Vitamin D (Cholecalciferol) 1000 UNITS Tabs      Take 2,000 Units by mouth        vitamin D 42684 UNIT capsule    ERGOCALCIFEROL     Take 2,000 Units by mouth daily        * Notice:  This list has 2 medication(s) that are the same as other medications prescribed for you. Read the directions carefully, and ask your doctor or other care provider to review them with you.

## 2018-04-16 ENCOUNTER — ALLIED HEALTH/NURSE VISIT (OUTPATIENT)
Dept: ALLERGY | Facility: OTHER | Age: 38
End: 2018-04-16
Attending: PHYSICIAN ASSISTANT
Payer: COMMERCIAL

## 2018-04-16 DIAGNOSIS — J30.9 ALLERGIC RHINITIS: Primary | ICD-10-CM

## 2018-04-16 PROCEDURE — 95115 IMMUNOTHERAPY ONE INJECTION: CPT

## 2018-04-16 NOTE — PROGRESS NOTES
Allergy injection given and charted on paper allergy flow sheet.  Patient signed out AMA.    Mariela Grady

## 2018-04-16 NOTE — MR AVS SNAPSHOT
After Visit Summary   4/16/2018    Sydni Isabel    MRN: [id number]           Patient Information     Date Of Birth          1980        Visit Information        Provider Department      4/16/2018 3:00 PM HC SHOT ROOM [facility] [place]        Today's Diagnoses     Allergic rhinitis    -  1       Follow-ups after your visit        Your next 10 appointments already scheduled     Apr 23, 2018  3:15 PM CDT   injection with HC SHOT ROOM   [facility] [place] ([facility] [place] )    [address]  [place] [address]   [phone]            May 02, 2018  3:45 PM CDT   injection with HC SHOT ROOM   [facility] [place] ([facility] [place] )    [address]  [place] [address]   [phone]            May 09, 2018  3:30 PM CDT   injection with HC SHOT ROOM   [facility] [place] ([facility] [place] )    [address]  [place] [address]   [phone]            May 16, 2018  3:00 PM CDT   injection with HC SHOT ROOM   [facility] [place] ([facility] [place] )    [address]  [place] [address]   [phone]            May 23, 2018  3:30 PM CDT   injection with HC SHOT ROOM   [facility] [place] ([facility] [place] )    [address]  [place] [address]   [phone]            May 30, 2018  3:30 PM CDT   injection with HC SHOT ROOM   [facility] [place] ([facility] [place] )    [address]  [place] [address]   [phone]              Who to contact     If you have questions or need follow up information about today's clinic visit or your schedule [facility] at [phone].  Normal or non-critical lab and imaging results will be communicated to you by MyChart, letter or phone within 4 business days after the clinic has received the results. If you do not hear from us within 7 days, please contact the clinic through Side.Crt  or phone. If you have a critical or abnormal lab result, we will notify you by phone as soon as possible.  Submit refill requests through U2opia Mobile or call your pharmacy and they will forward the refill request to us. Please allow 3 business days for your refill to be completed.          Additional Information About Your Visit        Orchard Platformhart Information     U2[employer] Mobile gives you secure access to your electronic health record. If you see a primary care provider, you can also send messages to your care team and make appointments. If you have questions, please call your primary care clinic.  If you do not have a primary care provider, please call [phone] and they will assist you.        Care EveryWhere ID     This is your Care EveryWhere ID. This could be used by other organizations to access your [facility] medical records  [id number]         Blood Pressure from Last 3 Encounters:   01/11/18 112/70   12/27/17 114/73   12/20/17 115/77    Weight from Last 3 Encounters:   01/11/18 174 lb (78.9 kg)   12/27/17 174 lb (78.9 kg)   12/20/17 174 lb (78.9 kg)              We Performed the Following     Allergy Shot: One injection        Primary Care Provider Office Phone # Fax #    Go Villarreal [phone]       [facility] [address] [facility] [address]        Equal Access to Services     [facility] MARGY : Hadii aad ku hadasho Soomaali, waaxda luqadaha, qaybta kaalmada adeegyada, waxay aneudy haylinda maier . So [facility] [phone].    ATENCIÓN: Si habla español, tiene a suero disposición servicios gratuitos de asistencia lingüística. Llame al [phone].    We comply with applicable federal civil rights laws and [place] laws. We do not discriminate on the basis of race, color, national origin, age, disability, sex, sexual orientation, or gender identity.            Thank you!     Thank you for choosing [facility]  for your care. Our goal is always to provide you with  excellent care. Hearing back from our patients is one way we can continue to improve our services. Please take a few minutes to complete the written survey that you may receive in the mail after your visit with us. Thank you!             Your Updated Medication List - Protect others around you: Learn how to safely use, store and throw away your medicines at www.disposemymeds.org.          This list is accurate as of 4/16/18  3:27 PM.  Always use your most recent med list.                   Brand Name Dispense Instructions for use Diagnosis    albuterol 108 (90 Base) MCG/ACT Inhaler    PROAIR HFA    1 Inhaler    Inhale 1 puff into the lungs 3 times daily For 3-5 days.        ALLERGEN IMMUNOTHERAPY PRESCRIPTION     5 mL    Proceed with SCIT per standard buildup protocol.    Perennial allergic rhinitis       azelastine-fluticasone 137-50 MCG/ACT nasal spray    DYMISTA    2 Bottle    Spray 1 spray into both nostrils 2 times daily    Perennial allergic rhinitis with seasonal variation, Seasonal allergic rhinitis, unspecified allergic rhinitis trigger       budesonide 0.5 MG/2ML neb solution    PULMICORT    3 Box    Squirt entire vial into previously made rosalba med saline bottle, mix, and irrigate each nostril until entire bottle empty.  Do this twice daily.    Post-operative state       budesonide-formoterol 160-4.5 MCG/ACT Inhaler    SYMBICORT     Inhale 2 puffs into the lungs 2 times daily        cetirizine 10 MG tablet    zyrTEC    30 tablet    Take 1 tablet (10 mg) by mouth every evening    Allergic rhinitis due to pollen, unspecified chronicity, unspecified seasonality, Seasonal allergic rhinitis, unspecified chronicity, unspecified trigger, Perennial allergic rhinitis with seasonal variation       dexlansoprazole 60 MG Cpdr CR capsule    DEXILANT     Take 60 mg by mouth daily        EPINEPHrine 0.3 MG/0.3ML injection    EPIPEN    2 each    Inject 0.3 mLs into the muscle once as needed for anaphylaxis for 1 dose.     Chronic rhinitis       fluticasone 50 MCG/ACT spray    FLONASE    16 g    Spray 2 sprays into both nostrils daily    Chronic rhinitis       loratadine 10 MG tablet    CLARITIN    30 tablet    TAKE 1 TABLET DAILY.    Allergic rhinitis due to pollen, Seasonal allergic rhinitis, Perennial allergic rhinitis with seasonal variation       montelukast 10 MG tablet    SINGULAIR    90 tablet    TAKE ONE TABLET AT BEDTIME.    Perennial allergic rhinitis with seasonal variation, Seasonal allergic rhinitis, Allergic rhinitis due to pollen       * multivitamin, therapeutic with minerals Tabs tablet      Take 1 tablet by mouth daily        * Multi-vitamin Tabs tablet           pantoprazole sodium 40 MG packet    PROTONIX     Take 40 mg by mouth        sinus rinse bottle     1 each    Use high volume rosalba med saline irrigation. Use warm distilled water and 2 packets of the salt solution that comes with the bottle, dissolve in bottle up to 240 ml lana. Irrigate each side of your nose leaning over the sink, using 1/3 to 1/2 the volume of the bottle in each nostril every irrigation.  Irrigate 5 times daily and as needed.  Add budesonide as indicated    Post-operative state       Vitamin D (Cholecalciferol) 1000 units Tabs      Take 2,000 Units by mouth        vitamin D 81163 UNIT capsule    ERGOCALCIFEROL     Take 2,000 Units by mouth daily        * Notice:  This list has 2 medication(s) that are the same as other medications prescribed for you. Read the directions carefully, and ask your doctor or other care provider to review them with you.

## 2018-04-27 ENCOUNTER — ALLIED HEALTH/NURSE VISIT (OUTPATIENT)
Dept: ALLERGY | Facility: OTHER | Age: 38
End: 2018-04-27
Attending: PHYSICIAN ASSISTANT
Payer: COMMERCIAL

## 2018-04-27 DIAGNOSIS — J30.9 ALLERGIC RHINITIS: Primary | ICD-10-CM

## 2018-04-27 PROCEDURE — 95115 IMMUNOTHERAPY ONE INJECTION: CPT

## 2018-04-27 NOTE — MR AVS SNAPSHOT
After Visit Summary   4/27/2018    Sydni Isabel    MRN: [id number]           Patient Information     Date Of Birth          1980        Visit Information        Provider Department      4/27/2018 2:15 PM HC SHOT ROOM [facility] [place]        Today's Diagnoses     Allergic rhinitis    -  1       Follow-ups after your visit        Your next 10 appointments already scheduled     May 02, 2018  3:45 PM CDT   injection with HC SHOT ROOM   [facility] [place] ([facility] [place] )    [address]  [place] [address]   [phone]            May 09, 2018  3:30 PM CDT   injection with HC SHOT ROOM   [facility] [place] ([facility] [place] )    [address]  [place] [address]   [phone]            May 16, 2018  3:00 PM CDT   injection with HC SHOT ROOM   [facility] [place] ([facility] [place] )    [address]  [place] [address]   [phone]            May 23, 2018  3:30 PM CDT   injection with HC SHOT ROOM   [facility] [place] ([facility] [place] )    [address]  [place] [address]   [phone]            May 30, 2018  3:30 PM CDT   injection with HC SHOT ROOM   [facility] [place] ([facility] [place] )    [address]  [place] [address]   [phone]              Who to contact     If you have questions or need follow up information about today's clinic visit or your schedule please contact [facility] at [phone].  Normal or non-critical lab and imaging results will be communicated to you by MyChart, letter or phone within 4 business days after the clinic has received the results. If you do not hear from us within 7 days, please contact the clinic through MyChart or phone. If you have a critical or abnormal lab result, we will notify you by phone as soon as possible.  Submit refill requests through [employer] or call your pharmacy and they will  forward the refill request to us. Please allow 3 business days for your refill to be completed.          Additional Information About Your Visit        Fundabilityhart Information     GiveLoop gives you secure access to your electronic health record. If you see a primary care provider, you can also send messages to your care team and make appointments. If you have questions, please call your primary care clinic.  If you do not have a primary care provider, please call [phone] and they will assist you.        Care EveryWhere ID     This is your Care EveryWhere ID. This could be used by other organizations to access your [facility] medical records  [id number]         Blood Pressure from Last 3 Encounters:   01/11/18 112/70   12/27/17 114/73   12/20/17 115/77    Weight from Last 3 Encounters:   01/11/18 174 lb (78.9 kg)   12/27/17 174 lb (78.9 kg)   12/20/17 174 lb (78.9 kg)              We Performed the Following     Allergy Shot: One injection        Primary Care Provider Office Phone # Fax #    Go Villarreal [phone]       [facility] [address]  [facility] [address]        Equal Access to Services     [facility]: Hadii aad ku hadasho Soomaali, waaxda luqadaha, qaybta kaalmada adeegyada, waxay sanderin hayarlynn martha maier . So [facility] [phone].    ATENCIÓN: Si habla español, tiene a suero disposición servicios gratuitos de asistencia lingüística. Llame al [phone].    We comply with applicable federal civil rights laws and [place] laws. We do not discriminate on the basis of race, color, national origin, age, disability, sex, sexual orientation, or gender identity.            Thank you!     Thank you for choosing [facility]  for your care. Our goal is always to provide you with excellent care. Hearing back from our patients is one way we can continue to improve our services. Please take a few minutes to complete the written survey that you may receive in the  mail after your visit with us. Thank you!             Your Updated Medication List - Protect others around you: Learn how to safely use, store and throw away your medicines at www.disposemymeds.org.          This list is accurate as of 4/27/18  2:34 PM.  Always use your most recent med list.                   Brand Name Dispense Instructions for use Diagnosis    albuterol 108 (90 Base) MCG/ACT Inhaler    PROAIR HFA    1 Inhaler    Inhale 1 puff into the lungs 3 times daily For 3-5 days.        ALLERGEN IMMUNOTHERAPY PRESCRIPTION     5 mL    Proceed with SCIT per standard buildup protocol.    Perennial allergic rhinitis       azelastine-fluticasone 137-50 MCG/ACT nasal spray    DYMISTA    2 Bottle    Spray 1 spray into both nostrils 2 times daily    Perennial allergic rhinitis with seasonal variation, Seasonal allergic rhinitis, unspecified allergic rhinitis trigger       budesonide 0.5 MG/2ML neb solution    PULMICORT    3 Box    Squirt entire vial into previously made rosalba med saline bottle, mix, and irrigate each nostril until entire bottle empty.  Do this twice daily.    Post-operative state       budesonide-formoterol 160-4.5 MCG/ACT Inhaler    SYMBICORT     Inhale 2 puffs into the lungs 2 times daily        cetirizine 10 MG tablet    zyrTEC    30 tablet    Take 1 tablet (10 mg) by mouth every evening    Allergic rhinitis due to pollen, unspecified chronicity, unspecified seasonality, Seasonal allergic rhinitis, unspecified chronicity, unspecified trigger, Perennial allergic rhinitis with seasonal variation       dexlansoprazole 60 MG Cpdr CR capsule    DEXILANT     Take 60 mg by mouth daily        EPINEPHrine 0.3 MG/0.3ML injection    EPIPEN    2 each    Inject 0.3 mLs into the muscle once as needed for anaphylaxis for 1 dose.    Chronic rhinitis       fluticasone 50 MCG/ACT spray    FLONASE    16 g    Spray 2 sprays into both nostrils daily    Chronic rhinitis       loratadine 10 MG tablet    CLARITIN    30  tablet    TAKE 1 TABLET DAILY.    Allergic rhinitis due to pollen, Seasonal allergic rhinitis, Perennial allergic rhinitis with seasonal variation       montelukast 10 MG tablet    SINGULAIR    90 tablet    TAKE ONE TABLET AT BEDTIME.    Perennial allergic rhinitis with seasonal variation, Seasonal allergic rhinitis, Allergic rhinitis due to pollen       * multivitamin, therapeutic with minerals Tabs tablet      Take 1 tablet by mouth daily        * Multi-vitamin Tabs tablet           pantoprazole sodium 40 MG packet    PROTONIX     Take 40 mg by mouth        sinus rinse bottle     1 each    Use high volume rosalba med saline irrigation. Use warm distilled water and 2 packets of the salt solution that comes with the bottle, dissolve in bottle up to 240 ml lana. Irrigate each side of your nose leaning over the sink, using 1/3 to 1/2 the volume of the bottle in each nostril every irrigation.  Irrigate 5 times daily and as needed.  Add budesonide as indicated    Post-operative state       Vitamin D (Cholecalciferol) 1000 units Tabs      Take 2,000 Units by mouth        vitamin D 80113 UNIT capsule    ERGOCALCIFEROL     Take 2,000 Units by mouth daily        * Notice:  This list has 2 medication(s) that are the same as other medications prescribed for you. Read the directions carefully, and ask your doctor or other care provider to review them with you.

## 2018-05-09 ENCOUNTER — ALLIED HEALTH/NURSE VISIT (OUTPATIENT)
Dept: ALLERGY | Facility: OTHER | Age: 38
End: 2018-05-09
Attending: PHYSICIAN ASSISTANT
Payer: COMMERCIAL

## 2018-05-09 DIAGNOSIS — J30.9 ALLERGIC RHINITIS: Primary | ICD-10-CM

## 2018-05-09 PROCEDURE — 95115 IMMUNOTHERAPY ONE INJECTION: CPT

## 2018-05-09 NOTE — PROGRESS NOTES
Prior to injection verified patient identity using patient's name and date of birth.    Allergy injection given and charted on paper allergy flow sheet.  EPI pen present.  Patient signed out AMA and did not stay for observation.     Thuy Mitchell RN

## 2018-05-09 NOTE — MR AVS SNAPSHOT
After Visit Summary   5/9/2018    Sydni Isabel    MRN: [id number]           Patient Information     Date Of Birth          1980        Visit Information        Provider Department      5/9/2018 3:00 PM HC SHOT ROOM [facility]        Today's Diagnoses     Allergic rhinitis    -  1       Follow-ups after your visit        Your next 10 appointments already scheduled     May 16, 2018  3:00 PM CDT   injection with HC SHOT ROOM   [facility] [place] ([facility] [place] )    [address]  [place] [address]   [phone]            May 23, 2018  3:30 PM CDT   injection with HC SHOT ROOM   [facility] [place] ([facility] [place] )    [address]  [place] [address]   [phone]            May 30, 2018  3:30 PM CDT   injection with HC SHOT ROOM   [facility] [place] ([facility] [place] )    [address]  [place] [address]   [phone]              Who to contact     If you have questions or need follow up information about today's clinic visit or your schedule please contact [facility] directly at [phone].  Normal or non-critical lab and imaging results will be communicated to you by MyChart, letter or phone within 4 business days after the clinic has received the results. If you do not hear from us within 7 days, please contact the clinic through [employer] or phone. If you have a critical or abnormal lab result, we will notify you by phone as soon as possible.  Submit refill requests through [employer] or call your pharmacy and they will forward the refill request to us. Please allow 3 business days for your refill to be completed.          Additional Information About Your Visit        MyChart Information     [employer] gives you secure access to your electronic health record. If you see a primary care provider, you can also send messages to your care team and make appointments. If you have questions,  please call your primary care clinic.  If you do not have a primary care provider, please call [phone] and they will assist you.        Care EveryWhere ID     This is your Care EveryWhere ID. This could be used by other organizations to access your [place] medical records  [id number]         Blood Pressure from Last 3 Encounters:   01/11/18 112/70   12/27/17 114/73   12/20/17 115/77    Weight from Last 3 Encounters:   01/11/18 174 lb (78.9 kg)   12/27/17 174 lb (78.9 kg)   12/20/17 174 lb (78.9 kg)              We Performed the Following     Allergy Shot: One injection        Primary Care Provider Office Phone # Fax #    Go Villarreal [phone]       [facility] [address]  [facility] [address]        Equal Access to Services     [facility]: Hadii aad ku hadasho Sojoanne, waaxda luqadaha, qaybta kaalmada adeegyada, susanna amado haylinda maier . So [facility] [phone].    ATENCIÓN: Si habla español, tiene a suero disposición servicios gratuitos de asistencia lingüística. Adeame al [phone].    We comply with applicable federal civil rights laws and [place] laws. We do not discriminate on the basis of race, color, national origin, age, disability, sex, sexual orientation, or gender identity.            Thank you!     Thank you for choosing [facility]  for your care. Our goal is always to provide you with excellent care. Hearing back from our patients is one way we can continue to improve our services. Please take a few minutes to complete the written survey that you may receive in the mail after your visit with us. Thank you!             Your Updated Medication List - Protect others around you: Learn how to safely use, store and throw away your medicines at www.disposemymeds.org.          This list is accurate as of 5/9/18  3:10 PM.  Always use your most recent med list.                   Brand Name Dispense Instructions for use Diagnosis     albuterol 108 (90 Base) MCG/ACT Inhaler    PROAIR HFA    1 Inhaler    Inhale 1 puff into the lungs 3 times daily For 3-5 days.        ALLERGEN IMMUNOTHERAPY PRESCRIPTION     5 mL    Proceed with SCIT per standard buildup protocol.    Perennial allergic rhinitis       azelastine-fluticasone 137-50 MCG/ACT nasal spray    DYMISTA    2 Bottle    Spray 1 spray into both nostrils 2 times daily    Perennial allergic rhinitis with seasonal variation, Seasonal allergic rhinitis, unspecified allergic rhinitis trigger       budesonide 0.5 MG/2ML neb solution    PULMICORT    3 Box    Squirt entire vial into previously made rosalba med saline bottle, mix, and irrigate each nostril until entire bottle empty.  Do this twice daily.    Post-operative state       budesonide-formoterol 160-4.5 MCG/ACT Inhaler    SYMBICORT     Inhale 2 puffs into the lungs 2 times daily        cetirizine 10 MG tablet    zyrTEC    30 tablet    Take 1 tablet (10 mg) by mouth every evening    Allergic rhinitis due to pollen, unspecified chronicity, unspecified seasonality, Seasonal allergic rhinitis, unspecified chronicity, unspecified trigger, Perennial allergic rhinitis with seasonal variation       dexlansoprazole 60 MG Cpdr CR capsule    DEXILANT     Take 60 mg by mouth daily        EPINEPHrine 0.3 MG/0.3ML injection    EPIPEN    2 each    Inject 0.3 mLs into the muscle once as needed for anaphylaxis for 1 dose.    Chronic rhinitis       fluticasone 50 MCG/ACT spray    FLONASE    16 g    Spray 2 sprays into both nostrils daily    Chronic rhinitis       loratadine 10 MG tablet    CLARITIN    30 tablet    TAKE 1 TABLET DAILY.    Allergic rhinitis due to pollen, Seasonal allergic rhinitis, Perennial allergic rhinitis with seasonal variation       montelukast 10 MG tablet    SINGULAIR    90 tablet    TAKE ONE TABLET AT BEDTIME.    Perennial allergic rhinitis with seasonal variation, Seasonal allergic rhinitis, Allergic rhinitis due to pollen       *  multivitamin, therapeutic with minerals Tabs tablet      Take 1 tablet by mouth daily        * Multi-vitamin Tabs tablet           pantoprazole sodium 40 MG packet    PROTONIX     Take 40 mg by mouth        sinus rinse bottle     1 each    Use high volume rosalba med saline irrigation. Use warm distilled water and 2 packets of the salt solution that comes with the bottle, dissolve in bottle up to 240 ml lana. Irrigate each side of your nose leaning over the sink, using 1/3 to 1/2 the volume of the bottle in each nostril every irrigation.  Irrigate 5 times daily and as needed.  Add budesonide as indicated    Post-operative state       Vitamin D (Cholecalciferol) 1000 units Tabs      Take 2,000 Units by mouth        vitamin D 31062 UNIT capsule    ERGOCALCIFEROL     Take 2,000 Units by mouth daily        * Notice:  This list has 2 medication(s) that are the same as other medications prescribed for you. Read the directions carefully, and ask your doctor or other care provider to review them with you.

## 2018-05-16 ENCOUNTER — ALLIED HEALTH/NURSE VISIT (OUTPATIENT)
Dept: ALLERGY | Facility: OTHER | Age: 38
End: 2018-05-16
Attending: PHYSICIAN ASSISTANT
Payer: COMMERCIAL

## 2018-05-16 DIAGNOSIS — J30.9 ALLERGIC RHINITIS: Primary | ICD-10-CM

## 2018-05-16 PROCEDURE — 95115 IMMUNOTHERAPY ONE INJECTION: CPT

## 2018-05-16 NOTE — MR AVS SNAPSHOT
After Visit Summary   5/16/2018    Sydni Isabel    MRN: [id number]           Patient Information     Date Of Birth          1980        Visit Information        Provider Department      5/16/2018 3:00 PM HC SHOT ROOM [facility]        Today's Diagnoses     Allergic rhinitis    -  1       Follow-ups after your visit        Your next 10 appointments already scheduled     May 23, 2018  3:30 PM CDT   injection with HC SHOT ROOM   [facility] [place] ([facility] [place] )    [address]  [place] [address]   [phone]            May 30, 2018  3:30 PM CDT   injection with HC SHOT ROOM   [facility] [place] ([facility] [place] )    [address]  [place] [address]   [phone]              Who to contact     If you have questions or need follow up information about today's clinic visit or your schedule please contact [facility] directly at [phone].  Normal or non-critical lab and imaging results will be communicated to you by [employer]hart, letter or phone within 4 business days after the clinic has received the results. If you do not hear from us within 7 days, please contact the clinic through [employer] or phone. If you have a critical or abnormal lab result, we will notify you by phone as soon as possible.  Submit refill requests through [employer] or call your pharmacy and they will forward the refill request to us. Please allow 3 business days for your refill to be completed.          Additional Information About Your Visit        MyChart Information     [employer] gives you secure access to your electronic health record. If you see a primary care provider, you can also send messages to your care team and make appointments. If you have questions, please call your primary care clinic.  If you do not have a primary care provider, please call [phone] and they will assist you.        Care EveryWhere ID     This is your Care  EveryWhere ID. This could be used by other organizations to access your [place] medical records  [id number]         Blood Pressure from Last 3 Encounters:   01/11/18 112/70   12/27/17 114/73   12/20/17 115/77    Weight from Last 3 Encounters:   01/11/18 174 lb (78.9 kg)   12/27/17 174 lb (78.9 kg)   12/20/17 174 lb (78.9 kg)              We Performed the Following     Allergy Shot: One injection        Primary Care Provider Office Phone # Fax #    Go Villarreal [phone]       [facility] [address]  [employer] [address]        Equal Access to Services     [facility] : Hadii alonzo justino Sojoanne, waaxda luqadaha, qaybta kaalmada adeegyada, susanna maier . So [facility] [phone].    ATENCIÓN: Si habla español, tiene a suero disposición servicios gratuitos de asistencia lingüística. Llame al [phone].    We comply with applicable federal civil rights laws and [place] laws. We do not discriminate on the basis of race, color, national origin, age, disability, sex, sexual orientation, or gender identity.            Thank you!     Thank you for choosing [facility]  for your care. Our goal is always to provide you with excellent care. Hearing back from our patients is one way we can continue to improve our services. Please take a few minutes to complete the written survey that you may receive in the mail after your visit with us. Thank you!             Your Updated Medication List - Protect others around you: Learn how to safely use, store and throw away your medicines at www.disposemymeds.org.          This list is accurate as of 5/16/18  3:18 PM.  Always use your most recent med list.                   Brand Name Dispense Instructions for use Diagnosis    albuterol 108 (90 Base) MCG/ACT Inhaler    PROAIR HFA    1 Inhaler    Inhale 1 puff into the lungs 3 times daily For 3-5 days.        ALLERGEN IMMUNOTHERAPY PRESCRIPTION     5 mL     Proceed with SCIT per standard buildup protocol.    Perennial allergic rhinitis       azelastine-fluticasone 137-50 MCG/ACT nasal spray    DYMISTA    2 Bottle    Spray 1 spray into both nostrils 2 times daily    Perennial allergic rhinitis with seasonal variation, Seasonal allergic rhinitis, unspecified allergic rhinitis trigger       budesonide 0.5 MG/2ML neb solution    PULMICORT    3 Box    Squirt entire vial into previously made rosalba med saline bottle, mix, and irrigate each nostril until entire bottle empty.  Do this twice daily.    Post-operative state       budesonide-formoterol 160-4.5 MCG/ACT Inhaler    SYMBICORT     Inhale 2 puffs into the lungs 2 times daily        cetirizine 10 MG tablet    zyrTEC    30 tablet    Take 1 tablet (10 mg) by mouth every evening    Allergic rhinitis due to pollen, unspecified chronicity, unspecified seasonality, Seasonal allergic rhinitis, unspecified chronicity, unspecified trigger, Perennial allergic rhinitis with seasonal variation       dexlansoprazole 60 MG Cpdr CR capsule    DEXILANT     Take 60 mg by mouth daily        EPINEPHrine 0.3 MG/0.3ML injection    EPIPEN    2 each    Inject 0.3 mLs into the muscle once as needed for anaphylaxis for 1 dose.    Chronic rhinitis       fluticasone 50 MCG/ACT spray    FLONASE    16 g    Spray 2 sprays into both nostrils daily    Chronic rhinitis       loratadine 10 MG tablet    CLARITIN    30 tablet    TAKE 1 TABLET DAILY.    Allergic rhinitis due to pollen, Seasonal allergic rhinitis, Perennial allergic rhinitis with seasonal variation       montelukast 10 MG tablet    SINGULAIR    90 tablet    TAKE ONE TABLET AT BEDTIME.    Perennial allergic rhinitis with seasonal variation, Seasonal allergic rhinitis, Allergic rhinitis due to pollen       * multivitamin, therapeutic with minerals Tabs tablet      Take 1 tablet by mouth daily        * Multi-vitamin Tabs tablet           pantoprazole sodium 40 MG packet    PROTONIX     Take 40 mg  by mouth        sinus rinse bottle     1 each    Use high volume rosalba med saline irrigation. Use warm distilled water and 2 packets of the salt solution that comes with the bottle, dissolve in bottle up to 240 ml lana. Irrigate each side of your nose leaning over the sink, using 1/3 to 1/2 the volume of the bottle in each nostril every irrigation.  Irrigate 5 times daily and as needed.  Add budesonide as indicated    Post-operative state       Vitamin D (Cholecalciferol) 1000 units Tabs      Take 2,000 Units by mouth        vitamin D 48082 UNIT capsule    ERGOCALCIFEROL     Take 2,000 Units by mouth daily        * Notice:  This list has 2 medication(s) that are the same as other medications prescribed for you. Read the directions carefully, and ask your doctor or other care provider to review them with you.

## 2018-05-23 ENCOUNTER — ALLIED HEALTH/NURSE VISIT (OUTPATIENT)
Dept: ALLERGY | Facility: OTHER | Age: 38
End: 2018-05-23
Attending: PHYSICIAN ASSISTANT
Payer: COMMERCIAL

## 2018-05-23 DIAGNOSIS — J30.9 ALLERGIC RHINITIS: Primary | ICD-10-CM

## 2018-05-23 PROCEDURE — 95115 IMMUNOTHERAPY ONE INJECTION: CPT

## 2018-05-23 NOTE — MR AVS SNAPSHOT
After Visit Summary   5/23/2018    Sydni Isabel    MRN: [id number]           Patient Information     Date Of Birth          1980        Visit Information        Provider Department      5/23/2018 3:30 PM [facility]        Today's Diagnoses     Allergic rhinitis    -  1       Follow-ups after your visit        Your next 10 appointments already scheduled     May 23, 2018  3:30 PM CDT   injection with HC SHOT ROOM   [facility] [place] ([facility] [place] [facility]    [address]  [place] [address]   [phone]            May 30, 2018  3:30 PM CDT   injection with HC SHOT ROOM   [facility] [place] ([facility] [place] )    [address]  [place] [address]   [phone]              Who to contact     If you have questions or need follow up information about today's clinic visit or your schedule please contact [facility] directly at [phone].  Normal or non-critical lab and imaging results will be communicated to you by [employer], letter or phone within 4 business days after the clinic has received the results. If you do not hear from us within 7 days, please contact the clinic through [employer] or phone. If you have a critical or abnormal lab result, we will notify you by phone as soon as possible.  Submit refill requests through Sol[employer] or call your pharmacy and they will forward the refill request to us. Please allow 3 business days for your refill to be completed.          Additional Information About Your Visit        MyChart Information     [employer] gives you secure access to your electronic health record. If you see a primary care provider, you can also send messages to your care team and make appointments. If you have questions, please call your primary care clinic.  If you do not have a primary care provider, please call [phone] and they will assist you.        Care EveryWhere ID     This is your Care  EveryWhere ID. This could be used by other organizations to access your [place] medical records  [id number]         Blood Pressure from Last 3 Encounters:   01/11/18 112/70   12/27/17 114/73   12/20/17 115/77    Weight from Last 3 Encounters:   01/11/18 174 lb (78.9 kg)   12/27/17 174 lb (78.9 kg)   12/20/17 174 lb (78.9 kg)              We Performed the Following     Allergy Shot: One injection        Primary Care Provider Office Phone # Fax #    Go Villarreal [phone]       [facility] [address]  [facility] [address]        Equal Access to Services     HERMINIO JI : Hadii alonzo justino Sojoanne, waaxda luqadaha, qaybta kaalmada adeegyada, susanna maier . So [facility] [phone].    ATENCIÓN: Si habla español, tiene a suero disposición servicios gratuitos de asistencia lingüística. Llame al [phone].    We comply with applicable federal civil rights laws and [place] laws. We do not discriminate on the basis of race, color, national origin, age, disability, sex, sexual orientation, or gender identity.            Thank you!     Thank you for choosing [facility]  for your care. Our goal is always to provide you with excellent care. Hearing back from our patients is one way we can continue to improve our services. Please take a few minutes to complete the written survey that you may receive in the mail after your visit with us. Thank you!             Your Updated Medication List - Protect others around you: Learn how to safely use, store and throw away your medicines at www.disposemymeds.org.          This list is accurate as of 5/23/18  3:12 PM.  Always use your most recent med list.                   Brand Name Dispense Instructions for use Diagnosis    albuterol 108 (90 Base) MCG/ACT Inhaler    PROAIR HFA    1 Inhaler    Inhale 1 puff into the lungs 3 times daily For 3-5 days.        ALLERGEN IMMUNOTHERAPY PRESCRIPTION     5 mL     Proceed with SCIT per standard buildup protocol.    Perennial allergic rhinitis       azelastine-fluticasone 137-50 MCG/ACT nasal spray    DYMISTA    2 Bottle    Spray 1 spray into both nostrils 2 times daily    Perennial allergic rhinitis with seasonal variation, Seasonal allergic rhinitis, unspecified allergic rhinitis trigger       budesonide 0.5 MG/2ML neb solution    PULMICORT    3 Box    Squirt entire vial into previously made rosalba med saline bottle, mix, and irrigate each nostril until entire bottle empty.  Do this twice daily.    Post-operative state       budesonide-formoterol 160-4.5 MCG/ACT Inhaler    SYMBICORT     Inhale 2 puffs into the lungs 2 times daily        cetirizine 10 MG tablet    zyrTEC    30 tablet    Take 1 tablet (10 mg) by mouth every evening    Allergic rhinitis due to pollen, unspecified chronicity, unspecified seasonality, Seasonal allergic rhinitis, unspecified chronicity, unspecified trigger, Perennial allergic rhinitis with seasonal variation       dexlansoprazole 60 MG Cpdr CR capsule    DEXILANT     Take 60 mg by mouth daily        EPINEPHrine 0.3 MG/0.3ML injection    EPIPEN    2 each    Inject 0.3 mLs into the muscle once as needed for anaphylaxis for 1 dose.    Chronic rhinitis       fluticasone 50 MCG/ACT spray    FLONASE    16 g    Spray 2 sprays into both nostrils daily    Chronic rhinitis       loratadine 10 MG tablet    CLARITIN    30 tablet    TAKE 1 TABLET DAILY.    Allergic rhinitis due to pollen, Seasonal allergic rhinitis, Perennial allergic rhinitis with seasonal variation       montelukast 10 MG tablet    SINGULAIR    90 tablet    TAKE ONE TABLET AT BEDTIME.    Perennial allergic rhinitis with seasonal variation, Seasonal allergic rhinitis, Allergic rhinitis due to pollen       * multivitamin, therapeutic with minerals Tabs tablet      Take 1 tablet by mouth daily        * Multi-vitamin Tabs tablet           pantoprazole sodium 40 MG packet    PROTONIX     Take 40 mg  by mouth        sinus rinse bottle     1 each    Use high volume rosalba med saline irrigation. Use warm distilled water and 2 packets of the salt solution that comes with the bottle, dissolve in bottle up to 240 ml lana. Irrigate each side of your nose leaning over the sink, using 1/3 to 1/2 the volume of the bottle in each nostril every irrigation.  Irrigate 5 times daily and as needed.  Add budesonide as indicated    Post-operative state       Vitamin D (Cholecalciferol) 1000 units Tabs      Take 2,000 Units by mouth        vitamin D 08383 UNIT capsule    ERGOCALCIFEROL     Take 2,000 Units by mouth daily        * Notice:  This list has 2 medication(s) that are the same as other medications prescribed for you. Read the directions carefully, and ask your doctor or other care provider to review them with you.

## 2018-05-23 NOTE — PROGRESS NOTES
Prior to injection verified patient identity using patient's name and date of birth.    Allergy injection/s given and charted on paper allergy flow sheet.  Patient left AMA and did not remain for observation. Consent form signed    Patrizia Cool

## 2018-05-30 ENCOUNTER — ALLIED HEALTH/NURSE VISIT (OUTPATIENT)
Dept: ALLERGY | Facility: OTHER | Age: 38
End: 2018-05-30
Attending: PHYSICIAN ASSISTANT
Payer: COMMERCIAL

## 2018-05-30 DIAGNOSIS — J30.9 ALLERGIC RHINITIS: Primary | ICD-10-CM

## 2018-05-30 PROCEDURE — 95115 IMMUNOTHERAPY ONE INJECTION: CPT

## 2018-05-30 NOTE — MR AVS SNAPSHOT
After Visit Summary   5/30/2018    Sydni Isabel    MRN: [id number]           Patient Information     Date Of Birth          1980        Visit Information        Provider Department      5/30/2018 3:30 PM HC SHOT ROOM [place] [facility]        Today's Diagnoses     Allergic rhinitis    -  1       Follow-ups after your visit        Who to contact     If you have questions or need follow up information about today's clinic visit or your schedule please contact [facility] directly at [phone].  Normal or non-critical lab and imaging results will be communicated to you by MyChart, letter or phone within 4 business days after the clinic has received the results. If you do not hear from us within 7 days, please contact the clinic through [employer] or phone. If you have a critical or abnormal lab result, we will notify you by phone as soon as possible.  Submit refill requests through [employer] or call your pharmacy and they will forward the refill request to us. Please allow 3 business days for your refill to be completed.          Additional Information About Your Visit        MyChart Information     [employer] gives you secure access to your electronic health record. If you see a primary care provider, you can also send messages to your care team and make appointments. If you have questions, please call your primary care clinic.  If you do not have a primary care provider, please call [phone] and they will assist you.        Care EveryWhere ID     This is your Care EveryWhere ID. This could be used by other organizations to access your [place] medical records  [id number]         Blood Pressure from Last 3 Encounters:   01/11/18 112/70   12/27/17 114/73   12/20/17 115/77    Weight from Last 3 Encounters:   01/11/18 174 lb (78.9 kg)   12/27/17 174 lb (78.9 kg)   12/20/17 174 lb (78.9 kg)              We Performed the Following     Allergy Shot: One injection        Primary Care  Provider Office Phone # Fax #    Go Villarreal [phone]       [facility] [address]  [facility] [address]        Equal Access to Services     HERMINIO JI : Hadii aad ku hadhernano Sojoshuaali, waaxda luqadaha, qaybta kaalmada adeshawnada, susanna arias kentrellrhonda hardwickerica monahan. So [facility] [phone].    ATENCIÓN: Si habla español, tiene a suero disposición servicios gratuitos de asistencia lingüística. Llame al [phone].    We comply with applicable federal civil rights laws and [place] laws. We do not discriminate on the basis of race, color, national origin, age, disability, sex, sexual orientation, or gender identity.            Thank you!     Thank you for choosing [facility]  for your care. Our goal is always to provide you with excellent care. Hearing back from our patients is one way we can continue to improve our services. Please take a few minutes to complete the written survey that you may receive in the mail after your visit with us. Thank you!             Your Updated Medication List - Protect others around you: Learn how to safely use, store and throw away your medicines at www.disposemymeds.org.          This list is accurate as of 5/30/18  3:47 PM.  Always use your most recent med list.                   Brand Name Dispense Instructions for use Diagnosis    albuterol 108 (90 Base) MCG/ACT Inhaler    PROAIR HFA    1 Inhaler    Inhale 1 puff into the lungs 3 times daily For 3-5 days.        ALLERGEN IMMUNOTHERAPY PRESCRIPTION     5 mL    Proceed with SCIT per standard buildup protocol.    Perennial allergic rhinitis       azelastine-fluticasone 137-50 MCG/ACT nasal spray    DYMISTA    2 Bottle    Spray 1 spray into both nostrils 2 times daily    Perennial allergic rhinitis with seasonal variation, Seasonal allergic rhinitis, unspecified allergic rhinitis trigger       budesonide 0.5 MG/2ML neb solution    PULMICORT    3 Box    Squirt entire vial into  previously made rosalba med saline bottle, mix, and irrigate each nostril until entire bottle empty.  Do this twice daily.    Post-operative state       budesonide-formoterol 160-4.5 MCG/ACT Inhaler    SYMBICORT     Inhale 2 puffs into the lungs 2 times daily        cetirizine 10 MG tablet    zyrTEC    30 tablet    Take 1 tablet (10 mg) by mouth every evening    Allergic rhinitis due to pollen, unspecified chronicity, unspecified seasonality, Seasonal allergic rhinitis, unspecified chronicity, unspecified trigger, Perennial allergic rhinitis with seasonal variation       dexlansoprazole 60 MG Cpdr CR capsule    DEXILANT     Take 60 mg by mouth daily        EPINEPHrine 0.3 MG/0.3ML injection    EPIPEN    2 each    Inject 0.3 mLs into the muscle once as needed for anaphylaxis for 1 dose.    Chronic rhinitis       fluticasone 50 MCG/ACT spray    FLONASE    16 g    Spray 2 sprays into both nostrils daily    Chronic rhinitis       loratadine 10 MG tablet    CLARITIN    30 tablet    TAKE 1 TABLET DAILY.    Allergic rhinitis due to pollen, Seasonal allergic rhinitis, Perennial allergic rhinitis with seasonal variation       montelukast 10 MG tablet    SINGULAIR    90 tablet    TAKE ONE TABLET AT BEDTIME.    Perennial allergic rhinitis with seasonal variation, Seasonal allergic rhinitis, Allergic rhinitis due to pollen       * multivitamin, therapeutic with minerals Tabs tablet      Take 1 tablet by mouth daily        * Multi-vitamin Tabs tablet           pantoprazole sodium 40 MG packet    PROTONIX     Take 40 mg by mouth        sinus rinse bottle     1 each    Use high volume rosalba med saline irrigation. Use warm distilled water and 2 packets of the salt solution that comes with the bottle, dissolve in bottle up to 240 ml lana. Irrigate each side of your nose leaning over the sink, using 1/3 to 1/2 the volume of the bottle in each nostril every irrigation.  Irrigate 5 times daily and as needed.  Add budesonide as indicated     Post-operative state       Vitamin D (Cholecalciferol) 1000 units Tabs      Take 2,000 Units by mouth        vitamin D 60755 UNIT capsule    ERGOCALCIFEROL     Take 2,000 Units by mouth daily        * Notice:  This list has 2 medication(s) that are the same as other medications prescribed for you. Read the directions carefully, and ask your doctor or other care provider to review them with you.

## 2018-05-30 NOTE — NURSING NOTE
Prior to injection verified patient identity using patient's name and date of birth.  Due to injection administration, patient instructed to remain in clinic for 15 minutes  afterwards, and to report any adverse reaction to me immediately.    Allergy injection given and documented on allergy flow sheet. Pt signed out AMA.  Ariane Haynes LPN

## 2018-06-04 ENCOUNTER — OFFICE VISIT (OUTPATIENT)
Dept: CHIROPRACTIC MEDICINE | Facility: OTHER | Age: 38
End: 2018-06-04
Attending: CHIROPRACTOR
Payer: COMMERCIAL

## 2018-06-04 DIAGNOSIS — M99.01 SEGMENTAL AND SOMATIC DYSFUNCTION OF CERVICAL REGION: Primary | ICD-10-CM

## 2018-06-04 DIAGNOSIS — M99.03 SEGMENTAL AND SOMATIC DYSFUNCTION OF LUMBAR REGION: ICD-10-CM

## 2018-06-04 DIAGNOSIS — M99.02 SEGMENTAL AND SOMATIC DYSFUNCTION OF THORACIC REGION: ICD-10-CM

## 2018-06-04 DIAGNOSIS — M54.2 CERVICALGIA: ICD-10-CM

## 2018-06-04 PROCEDURE — 98941 CHIROPRACT MANJ 3-4 REGIONS: CPT | Mod: AT | Performed by: CHIROPRACTOR

## 2018-06-04 NOTE — PROGRESS NOTES
Subjective Finding:    Chief compalint: Patient presents with:  Neck Pain: right scap pain  Back Pain  , Pain Scale: 5/10, Intensity: sharp, Duration: 1 months, Radiating: no.    Date of injury:     Activities that the pain restricts:   Home/household/hobbies/social activities: no.  Work duties: no.  Sleep: no.  Makes symptoms better: rest.  Makes symptoms worse: activity, cervical extension and cervical flexion.  Have you seen anyone else for the symptoms? MD.  Work related: no.  Automobile related injury: no.    Objective and Assessment:    Posture Analysis:   High shoulder: .  Head tilt: .  High iliac crest: .  Head carriage: forward.  Thoracic Kyphosis: neutral.  Lumbar Lordosis: neutral.    Lumbar Range of Motion: .  Cervical Range of Motion: .  Thoracic Range of Motion: left lateral flexion decreased.  Extremity Range of Motion: .    Palpation:   Lev scapulae: stiff, referred pain: no  Traps: sharp pain, referred pain: no    Segmental dysfunction pre-treatment and treatment area: T567  L45.  C56 right    Assessment post-treatment:  Cervical: ROM increased.  Thoracic: ROM increased.  Lumbar: ROM increased.    Comments: .      Complicating Factors: .    Plan / Procedure:    Treatment plan: prn.  Instructed patient: stretch as instructed at visit.  Short term goals: increase ROM.  Long term goals: restore normal function.  Prognosis: very good.

## 2018-06-04 NOTE — MR AVS SNAPSHOT
After Visit Summary   6/4/2018    Sydni Isabel    MRN: [id number]           Patient Information     Date Of Birth          1980        Visit Information        Provider Department      6/4/2018 2:50 PM [facility] [place]        Today's Diagnoses     Segmental and somatic dysfunction of cervical region    -  1    Cervicalgia        Segmental and somatic dysfunction of lumbar region        Segmental and somatic dysfunction of thoracic region           Follow-ups after your visit        Your next 10 appointments already scheduled     Jun 08, 2018  8:45 AM CDT   injection with HC SHOT ROOM   [facility] [place] ([facility] )    [address]  [place] [address]   [phone]              Who to contact     If you have questions or need follow up information about today's clinic visit or your schedule please contact  [facility]ANTLEY directly at [phone].  Normal or non-critical lab and imaging results will be communicated to you by [employer], letter or phone within 4 business days after the clinic has received the results. If you do not hear from us within 7 days, please contact the clinic through [employer] or phone. If you have a critical or abnormal lab result, we will notify you by phone as soon as possible.  Submit refill requests through [employer] or call your pharmacy and they will forward the refill request to us. Please allow 3 business days for your refill to be completed.          Additional Information About Your Visit        MyChart Information     [employer] gives you secure access to your electronic health record. If you see a primary care provider, you can also send messages to your care team and make appointments. If you have questions, please call your primary care clinic.  If you do not have a primary care provider, please call [phone] and they will assist you.        Care EveryWhere ID     This is your Care EveryWhere  ID. This could be used by other organizations to access your [place] medical records  [id number]         Blood Pressure from Last 3 Encounters:   01/11/18 112/70   12/27/17 114/73   12/20/17 115/77    Weight from Last 3 Encounters:   01/11/18 174 lb (78.9 kg)   12/27/17 174 lb (78.9 kg)   12/20/17 174 lb (78.9 kg)              We Performed the Following     CHIROPRAC MANIP,SPINAL,3-4 REGIONS        Primary Care Provider Office Phone # Fax #    Go Villarreal [phone]       [facility] [address]  [facility] [address]        Equal Access to Services     HERMINIO JI : Hadii alonzo Cordova, waaxda luqadaha, qaybta kaalmada adeegyada, susanna maier . So [facility] [phone].    ATENCIÓN: Si habla español, tiene a suero disposición servicios gratuitos de asistencia lingüística. Llame al [phone].    We comply with applicable federal civil rights laws and [place] laws. We do not discriminate on the basis of race, color, national origin, age, disability, sex, sexual orientation, or gender identity.            Thank you!     Thank you for choosing  [facility]  for your care. Our goal is always to provide you with excellent care. Hearing back from our patients is one way we can continue to improve our services. Please take a few minutes to complete the written survey that you may receive in the mail after your visit with us. Thank you!             Your Updated Medication List - Protect others around you: Learn how to safely use, store and throw away your medicines at www.disposemymeds.org.          This list is accurate as of 6/4/18  3:51 PM.  Always use your most recent med list.                   Brand Name Dispense Instructions for use Diagnosis    albuterol 108 (90 Base) MCG/ACT Inhaler    PROAIR HFA    1 Inhaler    Inhale 1 puff into the lungs 3 times daily For 3-5 days.        ALLERGEN IMMUNOTHERAPY PRESCRIPTION     5 mL    Proceed with  SCIT per standard buildup protocol.    Perennial allergic rhinitis       azelastine-fluticasone 137-50 MCG/ACT nasal spray    DYMISTA    2 Bottle    Spray 1 spray into both nostrils 2 times daily    Perennial allergic rhinitis with seasonal variation, Seasonal allergic rhinitis, unspecified allergic rhinitis trigger       budesonide 0.5 MG/2ML neb solution    PULMICORT    3 Box    Squirt entire vial into previously made rosalba med saline bottle, mix, and irrigate each nostril until entire bottle empty.  Do this twice daily.    Post-operative state       budesonide-formoterol 160-4.5 MCG/ACT Inhaler    SYMBICORT     Inhale 2 puffs into the lungs 2 times daily        cetirizine 10 MG tablet    zyrTEC    30 tablet    Take 1 tablet (10 mg) by mouth every evening    Allergic rhinitis due to pollen, unspecified chronicity, unspecified seasonality, Seasonal allergic rhinitis, unspecified chronicity, unspecified trigger, Perennial allergic rhinitis with seasonal variation       dexlansoprazole 60 MG Cpdr CR capsule    DEXILANT     Take 60 mg by mouth daily        EPINEPHrine 0.3 MG/0.3ML injection    EPIPEN    2 each    Inject 0.3 mLs into the muscle once as needed for anaphylaxis for 1 dose.    Chronic rhinitis       fluticasone 50 MCG/ACT spray    FLONASE    16 g    Spray 2 sprays into both nostrils daily    Chronic rhinitis       loratadine 10 MG tablet    CLARITIN    30 tablet    TAKE 1 TABLET DAILY.    Allergic rhinitis due to pollen, Seasonal allergic rhinitis, Perennial allergic rhinitis with seasonal variation       montelukast 10 MG tablet    SINGULAIR    90 tablet    TAKE ONE TABLET AT BEDTIME.    Perennial allergic rhinitis with seasonal variation, Seasonal allergic rhinitis, Allergic rhinitis due to pollen       * multivitamin, therapeutic with minerals Tabs tablet      Take 1 tablet by mouth daily        * Multi-vitamin Tabs tablet           pantoprazole sodium 40 MG packet    PROTONIX     Take 40 mg by mouth         sinus rinse bottle     1 each    Use high volume rosalba med saline irrigation. Use warm distilled water and 2 packets of the salt solution that comes with the bottle, dissolve in bottle up to 240 ml lana. Irrigate each side of your nose leaning over the sink, using 1/3 to 1/2 the volume of the bottle in each nostril every irrigation.  Irrigate 5 times daily and as needed.  Add budesonide as indicated    Post-operative state       Vitamin D (Cholecalciferol) 1000 units Tabs      Take 2,000 Units by mouth        vitamin D 00882 UNIT capsule    ERGOCALCIFEROL     Take 2,000 Units by mouth daily        * Notice:  This list has 2 medication(s) that are the same as other medications prescribed for you. Read the directions carefully, and ask your doctor or other care provider to review them with you.

## 2018-06-08 ENCOUNTER — ALLIED HEALTH/NURSE VISIT (OUTPATIENT)
Dept: ALLERGY | Facility: OTHER | Age: 38
End: 2018-06-08
Attending: PHYSICIAN ASSISTANT
Payer: COMMERCIAL

## 2018-06-08 DIAGNOSIS — J30.9 ALLERGIC RHINITIS: Primary | ICD-10-CM

## 2018-06-08 PROCEDURE — 95115 IMMUNOTHERAPY ONE INJECTION: CPT

## 2018-06-08 NOTE — MR AVS SNAPSHOT
After Visit Summary   6/8/2018    Sydni Isabel    MRN: [id number]           Patient Information     Date Of Birth          1980        Visit Information        Provider Department      6/8/2018 8:45 AM HC SHOT ROOM [facility]        Today's Diagnoses     Allergic rhinitis    -  1       Follow-ups after your visit        Who to contact     If you have questions or need follow up information about today's clinic visit or your schedule please contact [facility] directly at [phone].  Normal or non-critical lab and imaging results will be communicated to you by MyChart, letter or phone within 4 business days after the clinic has received the results. If you do not hear from us within 7 days, please contact the clinic through [email or web address] or phone. If you have a critical or abnormal lab result, we will notify you by phone as soon as possible.  Submit refill requests through [employer] or call your pharmacy and they will forward the refill request to us. Please allow 3 business days for your refill to be completed.          Additional Information About Your Visit        MyChart Information     [employer] gives you secure access to your electronic health record. If you see a primary care provider, you can also send messages to your care team and make appointments. If you have questions, please call your primary care clinic.  If you do not have a primary care provider, please call [phone] and they will assist you.        Care EveryWhere ID     This is your Care EveryWhere ID. This could be used by other organizations to access your [place] medical records  [id number]         Blood Pressure from Last 3 Encounters:   01/11/18 112/70   12/27/17 114/73   12/20/17 115/77    Weight from Last 3 Encounters:   01/11/18 174 lb (78.9 kg)   12/27/17 174 lb (78.9 kg)   12/20/17 174 lb (78.9 kg)              We Performed the Following     Allergy Shot: One injection        Primary Care  Provider Office Phone # Fax #    Go Villarreal [phone]       [facility] [address]  [facility] [address]        Equal Access to Services     HERMINIO JI : Hadii alonzo ku hadhernano Sojoshuaali, waaxda luqadaha, qaybta kaalmada adeshawnada, susanna arias kentrellrhonda hardwickerica monahan. So [facility] [phone].    ATENCIÓN: Si habla español, tiene a suero disposición servicios gratuitos de asistencia lingüística. Llame al [phone].    We comply with applicable federal civil rights laws and [place] laws. We do not discriminate on the basis of race, color, national origin, age, disability, sex, sexual orientation, or gender identity.            Thank you!     Thank you for choosing [facility]  for your care. Our goal is always to provide you with excellent care. Hearing back from our patients is one way we can continue to improve our services. Please take a few minutes to complete the written survey that you may receive in the mail after your visit with us. Thank you!             Your Updated Medication List - Protect others around you: Learn how to safely use, store and throw away your medicines at www.disposemymeds.org.          This list is accurate as of 6/8/18  8:58 AM.  Always use your most recent med list.                   Brand Name Dispense Instructions for use Diagnosis    albuterol 108 (90 Base) MCG/ACT Inhaler    PROAIR HFA    1 Inhaler    Inhale 1 puff into the lungs 3 times daily For 3-5 days.        ALLERGEN IMMUNOTHERAPY PRESCRIPTION     5 mL    Proceed with SCIT per standard buildup protocol.    Perennial allergic rhinitis       azelastine-fluticasone 137-50 MCG/ACT nasal spray    DYMISTA    2 Bottle    Spray 1 spray into both nostrils 2 times daily    Perennial allergic rhinitis with seasonal variation, Seasonal allergic rhinitis, unspecified allergic rhinitis trigger       budesonide 0.5 MG/2ML neb solution    PULMICORT    3 Box    Squirt entire vial into  previously made rosalba med saline bottle, mix, and irrigate each nostril until entire bottle empty.  Do this twice daily.    Post-operative state       budesonide-formoterol 160-4.5 MCG/ACT Inhaler    SYMBICORT     Inhale 2 puffs into the lungs 2 times daily        cetirizine 10 MG tablet    zyrTEC    30 tablet    Take 1 tablet (10 mg) by mouth every evening    Allergic rhinitis due to pollen, unspecified chronicity, unspecified seasonality, Seasonal allergic rhinitis, unspecified chronicity, unspecified trigger, Perennial allergic rhinitis with seasonal variation       dexlansoprazole 60 MG Cpdr CR capsule    DEXILANT     Take 60 mg by mouth daily        EPINEPHrine 0.3 MG/0.3ML injection    EPIPEN    2 each    Inject 0.3 mLs into the muscle once as needed for anaphylaxis for 1 dose.    Chronic rhinitis       fluticasone 50 MCG/ACT spray    FLONASE    16 g    Spray 2 sprays into both nostrils daily    Chronic rhinitis       loratadine 10 MG tablet    CLARITIN    30 tablet    TAKE 1 TABLET DAILY.    Allergic rhinitis due to pollen, Seasonal allergic rhinitis, Perennial allergic rhinitis with seasonal variation       montelukast 10 MG tablet    SINGULAIR    90 tablet    TAKE ONE TABLET AT BEDTIME.    Perennial allergic rhinitis with seasonal variation, Seasonal allergic rhinitis, Allergic rhinitis due to pollen       * multivitamin, therapeutic with minerals Tabs tablet      Take 1 tablet by mouth daily        * Multi-vitamin Tabs tablet           pantoprazole sodium 40 MG packet    PROTONIX     Take 40 mg by mouth        sinus rinse bottle     1 each    Use high volume rosalba med saline irrigation. Use warm distilled water and 2 packets of the salt solution that comes with the bottle, dissolve in bottle up to 240 ml lana. Irrigate each side of your nose leaning over the sink, using 1/3 to 1/2 the volume of the bottle in each nostril every irrigation.  Irrigate 5 times daily and as needed.  Add budesonide as indicated     Post-operative state       Vitamin D (Cholecalciferol) 1000 units Tabs      Take 2,000 Units by mouth        vitamin D 15863 UNIT capsule    ERGOCALCIFEROL     Take 2,000 Units by mouth daily        * Notice:  This list has 2 medication(s) that are the same as other medications prescribed for you. Read the directions carefully, and ask your doctor or other care provider to review them with you.

## 2018-06-18 ENCOUNTER — ALLIED HEALTH/NURSE VISIT (OUTPATIENT)
Dept: ALLERGY | Facility: OTHER | Age: 38
End: 2018-06-18
Attending: PHYSICIAN ASSISTANT
Payer: COMMERCIAL

## 2018-06-18 DIAGNOSIS — J30.9 ALLERGIC RHINITIS: Primary | ICD-10-CM

## 2018-06-18 PROCEDURE — 95115 IMMUNOTHERAPY ONE INJECTION: CPT

## 2018-06-18 NOTE — PROGRESS NOTES
Allergy injection/s given and charted on paper allergy flow sheet.  Patient experienced no reaction.  Pt signed out AMA  Jesenia Bryant

## 2018-06-18 NOTE — MR AVS SNAPSHOT
After Visit Summary   6/18/2018    Sydni Isabel    MRN: [id number]           Patient Information     Date Of Birth          1980        Visit Information        Provider Department      6/18/2018 1:30 PM HC SHOT ROOM [place]        Today's Diagnoses     Allergic rhinitis    -  1       Follow-ups after your visit        Who to contact     If you have questions or need follow up information about today's clinic visit or your schedule please contact [facility] directly at [phone].  Normal or non-critical lab and imaging results will be communicated to you by MyChart, letter or phone within 4 business days after the clinic has received the results. If you do not hear from us within 7 days, please contact the clinic through [employer] or phone. If you have a critical or abnormal lab result, we will notify you by phone as soon as possible.  Submit refill requests through [employer] or call your pharmacy and they will forward the refill request to us. Please allow 3 business days for your refill to be completed.          Additional Information About Your Visit        MyChart Information     [employer] gives you secure access to your electronic health record. If you see a primary care provider, you can also send messages to your care team and make appointments. If you have questions, please call your primary care clinic.  If you do not have a primary care provider, please call [phone] and they will assist you.        Care EveryWhere ID     This is your Care EveryWhere ID. This could be used by other organizations to access your [facility] medical records  [id number]         Blood Pressure from Last 3 Encounters:   01/11/18 112/70   12/27/17 114/73   12/20/17 115/77    Weight from Last 3 Encounters:   01/11/18 174 lb (78.9 kg)   12/27/17 174 lb (78.9 kg)   12/20/17 174 lb (78.9 kg)              We Performed the Following     Allergy Shot: One injection        Primary Care  Provider Office Phone # Fax #    Go Villarreal [phone]       [facility] [address]  [facility] [address]        Equal Access to Services     HERMINIO JI : Hadii alonzo ku hadhernano Sojoshuaali, waaxda luqadaha, qaybta kaalmada adeshawnada, susanna arias kentrellrhonda hardwickerica monahan. So [facility] [phone].    ATENCIÓN: Si habla español, tiene a suero disposición servicios gratuitos de asistencia lingüística. Llame al [phone].    We comply with applicable federal civil rights laws and [place] laws. We do not discriminate on the basis of race, color, national origin, age, disability, sex, sexual orientation, or gender identity.            Thank you!     Thank you for choosing [facility]  for your care. Our goal is always to provide you with excellent care. Hearing back from our patients is one way we can continue to improve our services. Please take a few minutes to complete the written survey that you may receive in the mail after your visit with us. Thank you!             Your Updated Medication List - Protect others around you: Learn how to safely use, store and throw away your medicines at www.disposemymeds.org.          This list is accurate as of 6/18/18  2:06 PM.  Always use your most recent med list.                   Brand Name Dispense Instructions for use Diagnosis    albuterol 108 (90 Base) MCG/ACT Inhaler    PROAIR HFA    1 Inhaler    Inhale 1 puff into the lungs 3 times daily For 3-5 days.        ALLERGEN IMMUNOTHERAPY PRESCRIPTION     5 mL    Proceed with SCIT per standard buildup protocol.    Perennial allergic rhinitis       azelastine-fluticasone 137-50 MCG/ACT nasal spray    DYMISTA    2 Bottle    Spray 1 spray into both nostrils 2 times daily    Perennial allergic rhinitis with seasonal variation, Seasonal allergic rhinitis, unspecified allergic rhinitis trigger       budesonide 0.5 MG/2ML neb solution    PULMICORT    3 Box    Squirt entire vial into  previously made rosalba med saline bottle, mix, and irrigate each nostril until entire bottle empty.  Do this twice daily.    Post-operative state       budesonide-formoterol 160-4.5 MCG/ACT Inhaler    SYMBICORT     Inhale 2 puffs into the lungs 2 times daily        cetirizine 10 MG tablet    zyrTEC    30 tablet    Take 1 tablet (10 mg) by mouth every evening    Allergic rhinitis due to pollen, unspecified chronicity, unspecified seasonality, Seasonal allergic rhinitis, unspecified chronicity, unspecified trigger, Perennial allergic rhinitis with seasonal variation       dexlansoprazole 60 MG Cpdr CR capsule    DEXILANT     Take 60 mg by mouth daily        EPINEPHrine 0.3 MG/0.3ML injection    EPIPEN    2 each    Inject 0.3 mLs into the muscle once as needed for anaphylaxis for 1 dose.    Chronic rhinitis       fluticasone 50 MCG/ACT spray    FLONASE    16 g    Spray 2 sprays into both nostrils daily    Chronic rhinitis       loratadine 10 MG tablet    CLARITIN    30 tablet    TAKE 1 TABLET DAILY.    Allergic rhinitis due to pollen, Seasonal allergic rhinitis, Perennial allergic rhinitis with seasonal variation       montelukast 10 MG tablet    SINGULAIR    90 tablet    TAKE ONE TABLET AT BEDTIME.    Perennial allergic rhinitis with seasonal variation, Seasonal allergic rhinitis, Allergic rhinitis due to pollen       * multivitamin, therapeutic with minerals Tabs tablet      Take 1 tablet by mouth daily        * Multi-vitamin Tabs tablet           pantoprazole sodium 40 MG packet    PROTONIX     Take 40 mg by mouth        sinus rinse bottle     1 each    Use high volume rosalba med saline irrigation. Use warm distilled water and 2 packets of the salt solution that comes with the bottle, dissolve in bottle up to 240 ml lana. Irrigate each side of your nose leaning over the sink, using 1/3 to 1/2 the volume of the bottle in each nostril every irrigation.  Irrigate 5 times daily and as needed.  Add budesonide as indicated     Post-operative state       Vitamin D (Cholecalciferol) 1000 units Tabs      Take 2,000 Units by mouth        vitamin D 07932 UNIT capsule    ERGOCALCIFEROL     Take 2,000 Units by mouth daily        * Notice:  This list has 2 medication(s) that are the same as other medications prescribed for you. Read the directions carefully, and ask your doctor or other care provider to review them with you.

## 2018-06-29 ENCOUNTER — ALLIED HEALTH/NURSE VISIT (OUTPATIENT)
Dept: ALLERGY | Facility: OTHER | Age: 38
End: 2018-06-29
Attending: PHYSICIAN ASSISTANT
Payer: COMMERCIAL

## 2018-06-29 DIAGNOSIS — J30.9 ALLERGIC RHINITIS: Primary | ICD-10-CM

## 2018-06-29 PROCEDURE — 95115 IMMUNOTHERAPY ONE INJECTION: CPT

## 2018-06-29 NOTE — PROGRESS NOTES
Prior to injection verified patient identity using patient's name and date of birth.  Allergy injection given and charted on paper allergy flow sheet. Pt leaves without waiting 30 min.  AMA form signed by pt.   Laura Zhang LPN

## 2018-07-11 ENCOUNTER — ALLIED HEALTH/NURSE VISIT (OUTPATIENT)
Dept: ALLERGY | Facility: OTHER | Age: 38
End: 2018-07-11
Attending: PHYSICIAN ASSISTANT
Payer: COMMERCIAL

## 2018-07-11 DIAGNOSIS — J30.9 ALLERGIC RHINITIS: Primary | ICD-10-CM

## 2018-07-11 PROCEDURE — 95115 IMMUNOTHERAPY ONE INJECTION: CPT

## 2018-07-11 NOTE — PROGRESS NOTES
Prior to injection verified patient identity using patient's name and date of birth.    Allergy injection/s given and charted on paper allergy flow sheet.   Pt signed out AMA.

## 2018-07-23 ENCOUNTER — ALLIED HEALTH/NURSE VISIT (OUTPATIENT)
Dept: ALLERGY | Facility: OTHER | Age: 38
End: 2018-07-23
Attending: PHYSICIAN ASSISTANT
Payer: COMMERCIAL

## 2018-07-23 DIAGNOSIS — J30.9 ALLERGIC RHINITIS: Primary | ICD-10-CM

## 2018-07-23 PROCEDURE — 95115 IMMUNOTHERAPY ONE INJECTION: CPT

## 2018-07-23 NOTE — MR AVS SNAPSHOT
After Visit Summary   7/23/2018    Sydni Isabel    MRN: [id number]           Patient Information     Date Of Birth          1980        Visit Information        Provider Department      7/23/2018 3:45 PM HC SHOT ROOM [facility]        Today's Diagnoses     Allergic rhinitis    -  1       Follow-ups after your visit        Your next 10 appointments already scheduled     Jul 23, 2018  3:45 PM CDT   injection with HC SHOT ROOM   [facility] ([facility] )    [place] [address]   [phone]              Who to contact     If you have questions or need follow up information about today's clinic visit or your schedule please contact [facility] directly at [phone].  Normal or non-critical lab and imaging results will be communicated to you by [employer], letter or phone within 4 business days after the clinic has received the results. If you do not hear from us within 7 days, please contact the clinic through [employer] or phone. If you have a critical or abnormal lab result, we will notify you by phone as soon as possible.  Submit refill requests through [employer] or call your pharmacy and they will forward the refill request to us. Please allow 3 business days for your refill to be completed.          Additional Information About Your Visit        MyChart Information     [employer] gives you secure access to your electronic health record. If you see a primary care provider, you can also send messages to your care team and make appointments. If you have questions, please call your primary care clinic.  If you do not have a primary care provider, please call [phone] and they will assist you.        Care EveryWhere ID     This is your Care EveryWhere ID. This could be used by other organizations to access your [facility] medical records  [id number]         Blood Pressure from Last 3 Encounters:   01/11/18 112/70   12/27/17  114/73   12/20/17 115/77    Weight from Last 3 Encounters:   01/11/18 174 lb (78.9 kg)   12/27/17 174 lb (78.9 kg)   12/20/17 174 lb (78.9 kg)              We Performed the Following     Allergy Shot: One injection        Primary Care Provider Office Phone # Fax #    Go Villarreal [phone]       [facility] [address]  [facility] [address]        Equal Access to Services     [facility]: Hadii aad ku hadasho Soomaali, waaxda luqadaha, qaybta kaalmada adeegyada, waxay idiin hayaan adeeg kharash larandolph . So [facility] [phone].    ATENCIÓN: Si habla español, tiene a suero disposición servicios gratuitos de asistencia lingüística. [facility] [phone].    We comply with applicable federal civil rights laws and [place] laws. We do not discriminate on the basis of race, color, national origin, age, disability, sex, sexual orientation, or gender identity.            Thank you!     Thank you for choosing [facility]  for your care. Our goal is always to provide you with excellent care. Hearing back from our patients is one way we can continue to improve our services. Please take a few minutes to complete the written survey that you may receive in the mail after your visit with us. Thank you!             Your Updated Medication List - Protect others around you: Learn how to safely use, store and throw away your medicines at www.disposemymeds.org.          This list is accurate as of 7/23/18  3:19 PM.  Always use your most recent med list.                   Brand Name Dispense Instructions for use Diagnosis    albuterol 108 (90 Base) MCG/ACT Inhaler    PROAIR HFA    1 Inhaler    Inhale 1 puff into the lungs 3 times daily For 3-5 days.        ALLERGEN IMMUNOTHERAPY PRESCRIPTION     5 mL    Proceed with SCIT per standard buildup protocol.    Perennial allergic rhinitis       azelastine-fluticasone 137-50 MCG/ACT nasal spray    DYMISTA    2 Bottle    Spray 1 spray into both  nostrils 2 times daily    Perennial allergic rhinitis with seasonal variation, Seasonal allergic rhinitis, unspecified allergic rhinitis trigger       budesonide 0.5 MG/2ML neb solution    PULMICORT    3 Box    Squirt entire vial into previously made rosalba med saline bottle, mix, and irrigate each nostril until entire bottle empty.  Do this twice daily.    Post-operative state       budesonide-formoterol 160-4.5 MCG/ACT Inhaler    SYMBICORT     Inhale 2 puffs into the lungs 2 times daily        cetirizine 10 MG tablet    zyrTEC    30 tablet    Take 1 tablet (10 mg) by mouth every evening    Allergic rhinitis due to pollen, unspecified chronicity, unspecified seasonality, Seasonal allergic rhinitis, unspecified chronicity, unspecified trigger, Perennial allergic rhinitis with seasonal variation       dexlansoprazole 60 MG Cpdr CR capsule    DEXILANT     Take 60 mg by mouth daily        EPINEPHrine 0.3 MG/0.3ML injection    EPIPEN    2 each    Inject 0.3 mLs into the muscle once as needed for anaphylaxis for 1 dose.    Chronic rhinitis       fluticasone 50 MCG/ACT spray    FLONASE    16 g    Spray 2 sprays into both nostrils daily    Chronic rhinitis       loratadine 10 MG tablet    CLARITIN    30 tablet    TAKE 1 TABLET DAILY.    Allergic rhinitis due to pollen, Seasonal allergic rhinitis, Perennial allergic rhinitis with seasonal variation       montelukast 10 MG tablet    SINGULAIR    90 tablet    TAKE ONE TABLET AT BEDTIME.    Perennial allergic rhinitis with seasonal variation, Seasonal allergic rhinitis, Allergic rhinitis due to pollen       * multivitamin, therapeutic with minerals Tabs tablet      Take 1 tablet by mouth daily        * Multi-vitamin Tabs tablet           pantoprazole sodium 40 MG packet    PROTONIX     Take 40 mg by mouth        sinus rinse bottle     1 each    Use high volume rosalba med saline irrigation. Use warm distilled water and 2 packets of the salt solution that comes with the bottle,  dissolve in bottle up to 240 ml lana. Irrigate each side of your nose leaning over the sink, using 1/3 to 1/2 the volume of the bottle in each nostril every irrigation.  Irrigate 5 times daily and as needed.  Add budesonide as indicated    Post-operative state       Vitamin D (Cholecalciferol) 1000 units Tabs      Take 2,000 Units by mouth        vitamin D 72351 UNIT capsule    ERGOCALCIFEROL     Take 2,000 Units by mouth daily        * Notice:  This list has 2 medication(s) that are the same as other medications prescribed for you. Read the directions carefully, and ask your doctor or other care provider to review them with you.

## 2018-07-25 ENCOUNTER — ALLIED HEALTH/NURSE VISIT (OUTPATIENT)
Dept: ALLERGY | Facility: OTHER | Age: 38
End: 2018-07-25
Attending: PHYSICIAN ASSISTANT
Payer: COMMERCIAL

## 2018-07-25 DIAGNOSIS — J30.9 ALLERGIC RHINITIS: ICD-10-CM

## 2018-07-25 PROCEDURE — 95165 ANTIGEN THERAPY SERVICES: CPT | Performed by: PHYSICIAN ASSISTANT

## 2018-07-25 NOTE — MR AVS SNAPSHOT
After Visit Summary   7/25/2018    Sydni Isabel    MRN: [id number]           Patient Information     Date Of Birth          1980        Visit Information        Provider Department      7/25/2018 8:30 AM  SHOT ROOM [place] [facility] [place]        Today's Diagnoses     Allergic rhinitis           Follow-ups after your visit        Who to contact     If you have questions or need follow up information about today's clinic visit or your schedule please contact [facility] directly at [phone].  Normal or non-critical lab and imaging results will be communicated to you by [employer], letter or phone within 4 business days after the clinic has received the results. If you do not hear from us within 7 days, please contact the clinic through [employer] or phone. If you have a critical or abnormal lab result, we will notify you by phone as soon as possible.  Submit refill requests through [employer] or call your pharmacy and they will forward the refill request to us. Please allow 3 business days for your refill to be completed.          Additional Information About Your Visit        MyChart Information     [employer] gives you secure access to your electronic health record. If you see a primary care provider, you can also send messages to your care team and make appointments. If you have questions, please call your primary care clinic.  If you do not have a primary care provider, please call [phone] and they will assist you.        Care EveryWhere ID     This is your Care EveryWhere ID. This could be used by other organizations to access your [facility] medical records  [id number]         Blood Pressure from Last 3 Encounters:   01/11/18 112/70   12/27/17 114/73   12/20/17 115/77    Weight from Last 3 Encounters:   01/11/18 174 lb (78.9 kg)   12/27/17 174 lb (78.9 kg)   12/20/17 174 lb (78.9 kg)              Today, you had the following     No orders found for display       Primary Care  Provider Office Phone # Fax #    Go Villarreal [phone]       [facility] [address]  [facility] [address]        Equal Access to Services     HERMINIO JI : Hadii alonzo ku hadhernano Sojoshuaali, waaxda luqadaha, qaybta kaalmada adeshawnada, susanna arias kentrellrhonda hardwickerica monahan. So [facility] [phone].    ATENCIÓN: Si habla español, tiene a suero disposición servicios gratuitos de asistencia lingüística. Llame al [phone].    We comply with applicable federal civil rights laws and [place] laws. We do not discriminate on the basis of race, color, national origin, age, disability, sex, sexual orientation, or gender identity.            Thank you!     Thank you for choosing [facility]  for your care. Our goal is always to provide you with excellent care. Hearing back from our patients is one way we can continue to improve our services. Please take a few minutes to complete the written survey that you may receive in the mail after your visit with us. Thank you!             Your Updated Medication List - Protect others around you: Learn how to safely use, store and throw away your medicines at www.disposemymeds.org.          This list is accurate as of 7/25/18 12:02 PM.  Always use your most recent med list.                   Brand Name Dispense Instructions for use Diagnosis    albuterol 108 (90 Base) MCG/ACT Inhaler    PROAIR HFA    1 Inhaler    Inhale 1 puff into the lungs 3 times daily For 3-5 days.        ALLERGEN IMMUNOTHERAPY PRESCRIPTION     5 mL    Proceed with SCIT per standard buildup protocol.    Perennial allergic rhinitis       azelastine-fluticasone 137-50 MCG/ACT nasal spray    DYMISTA    2 Bottle    Spray 1 spray into both nostrils 2 times daily    Perennial allergic rhinitis with seasonal variation, Seasonal allergic rhinitis, unspecified allergic rhinitis trigger       budesonide 0.5 MG/2ML neb solution    PULMICORT    3 Box    Squirt entire vial into  previously made rosalba med saline bottle, mix, and irrigate each nostril until entire bottle empty.  Do this twice daily.    Post-operative state       budesonide-formoterol 160-4.5 MCG/ACT Inhaler    SYMBICORT     Inhale 2 puffs into the lungs 2 times daily        cetirizine 10 MG tablet    zyrTEC    30 tablet    Take 1 tablet (10 mg) by mouth every evening    Allergic rhinitis due to pollen, unspecified chronicity, unspecified seasonality, Seasonal allergic rhinitis, unspecified chronicity, unspecified trigger, Perennial allergic rhinitis with seasonal variation       dexlansoprazole 60 MG Cpdr CR capsule    DEXILANT     Take 60 mg by mouth daily        EPINEPHrine 0.3 MG/0.3ML injection    EPIPEN    2 each    Inject 0.3 mLs into the muscle once as needed for anaphylaxis for 1 dose.    Chronic rhinitis       fluticasone 50 MCG/ACT spray    FLONASE    16 g    Spray 2 sprays into both nostrils daily    Chronic rhinitis       loratadine 10 MG tablet    CLARITIN    30 tablet    TAKE 1 TABLET DAILY.    Allergic rhinitis due to pollen, Seasonal allergic rhinitis, Perennial allergic rhinitis with seasonal variation       montelukast 10 MG tablet    SINGULAIR    90 tablet    TAKE ONE TABLET AT BEDTIME.    Perennial allergic rhinitis with seasonal variation, Seasonal allergic rhinitis, Allergic rhinitis due to pollen       * multivitamin, therapeutic with minerals Tabs tablet      Take 1 tablet by mouth daily        * Multi-vitamin Tabs tablet           pantoprazole sodium 40 MG packet    PROTONIX     Take 40 mg by mouth        sinus rinse bottle     1 each    Use high volume rosalba med saline irrigation. Use warm distilled water and 2 packets of the salt solution that comes with the bottle, dissolve in bottle up to 240 ml lana. Irrigate each side of your nose leaning over the sink, using 1/3 to 1/2 the volume of the bottle in each nostril every irrigation.  Irrigate 5 times daily and as needed.  Add budesonide as indicated     Post-operative state       Vitamin D (Cholecalciferol) 1000 units Tabs      Take 2,000 Units by mouth        vitamin D 99487 UNIT capsule    ERGOCALCIFEROL     Take 2,000 Units by mouth daily        * Notice:  This list has 2 medication(s) that are the same as other medications prescribed for you. Read the directions carefully, and ask your doctor or other care provider to review them with you.

## 2018-07-25 NOTE — PROGRESS NOTES
Allergy serum is mixed today at schedule green 4 of 4,  into  1  (5 ml)  multi dose vial/vials.    Allergens included were:    Ragweed  0.2 ml of dilution # 0  Pigweed  0.2 ml of dilution # 0  Mugwort 0.2 ml of dilution # 0  Kochia  0.2 ml of dilution # 0  Russian Thistle 0.2 ml of dilution # 1  Viraj Grass 0.2 ml of dilution # 1  Birch mix 0.2 ml of dilution # 1  Maple Mix 0.2 of dilution # 1  Elm Mix 0.2 ml of dilution # 1  Oak Mix 0.2 ml of dilution # 1  Joey Mix 0.2 ml of dilution # 1  Pine Mix 0.2 ml of dilution # 1  [place] 0.2 ml of dilution # 0  Black [place] 0.2 ml of dilution # 0  Aspen 0.2 ml of dilution # 0  Red [place] 0.2 ml of dilution # 0    Alternaria 0.2 ml of dilution # 1  Aspergillus 0.2 ml of dilution # 0  Hormodendrum 0.2 ml of dilution # 0  Helminthosporium 0.2 ml of dilution # 0  Penicillium 0.2 ml of dilution # 1  Epicoccum 0.2 ml of dilution # 0  Fusarium 0.2 ml of dilution # 0  Mucor 0.2 ml of dilution # 0  Grain Smut 0.2 ml of dilution # 0  Grass Smut 0.2 ml of dilution # 0  Cat 0.2 ml of dilution # 1  Dog 0.2 ml of dilution # 1  Feather Mix 0.2 ml of dilution # 0  Dust Mite Mix 0.2 ml of dilution # 1  Horse 0.2 ml of dilution # 0    Ana Luisa Khan RN

## 2018-08-01 ENCOUNTER — MEDICAL CORRESPONDENCE (OUTPATIENT)
Dept: HEALTH INFORMATION MANAGEMENT | Facility: CLINIC | Age: 38
End: 2018-08-01

## 2018-08-03 ENCOUNTER — TELEPHONE (OUTPATIENT)
Dept: ALLERGY | Facility: OTHER | Age: 38
End: 2018-08-03

## 2018-08-03 DIAGNOSIS — J30.89 PERENNIAL ALLERGIC RHINITIS: ICD-10-CM

## 2018-08-06 ENCOUNTER — ALLIED HEALTH/NURSE VISIT (OUTPATIENT)
Dept: ALLERGY | Facility: OTHER | Age: 38
End: 2018-08-06
Attending: PHYSICIAN ASSISTANT
Payer: COMMERCIAL

## 2018-08-06 DIAGNOSIS — J30.9 ALLERGIC RHINITIS: Primary | ICD-10-CM

## 2018-08-06 PROCEDURE — 95115 IMMUNOTHERAPY ONE INJECTION: CPT

## 2018-08-06 NOTE — PROGRESS NOTES
Prior to injection verified patient identity using patient's name and date of birth.    Safety Vial Test was done in  Left arm for new Green vial # 1.  Administered 0.01 ml (ID) for SVT.   SVT = 10.    Pass    Allergy injection given and charted on the paper flow sheet.      Per orders of Martina Dominguez, an allergy injections is given by Thuy Herrera.     The patient signed out AMA.    Thuy Herrera

## 2018-08-06 NOTE — MR AVS SNAPSHOT
After Visit Summary   8/6/2018    Sydni Isabel    MRN: [id number]           Patient Information     Date Of Birth          1980        Visit Information        Provider Department      8/6/2018 3:30 PM HC SHOT ROOM [place] [facility]        Today's Diagnoses     Allergic rhinitis    -  1       Follow-ups after your visit        Who to contact     If you have questions or need follow up information about today's clinic visit or your schedule please contact [facility]HA directly at [phone].  Normal or non-critical lab and imaging results will be communicated to you by MyChart, letter or phone within 4 business days after the clinic has received the results. If you do not hear from us within 7 days, please contact the clinic through [employer] or phone. If you have a critical or abnormal lab result, we will notify you by phone as soon as possible.  Submit refill requests through [employer] or call your pharmacy and they will forward the refill request to us. Please allow 3 business days for your refill to be completed.          Additional Information About Your Visit        MyChart Information     [employer] gives you secure access to your electronic health record. If you see a primary care provider, you can also send messages to your care team and make appointments. If you have questions, please call your primary care clinic.  If you do not have a primary care provider, please call [phone] and they will assist you.        Care EveryWhere ID     This is your Care EveryWhere ID. This could be used by other organizations to access your [place] medical records  [id number]         Blood Pressure from Last 3 Encounters:   01/11/18 112/70   12/27/17 114/73   12/20/17 115/77    Weight from Last 3 Encounters:   01/11/18 174 lb (78.9 kg)   12/27/17 174 lb (78.9 kg)   12/20/17 174 lb (78.9 kg)              We Performed the Following     Allergy Shot: One injection        Primary Care  Provider Office Phone # Fax #    Go [facility] [phone]       [facility] [address]  [facility] [address]        Equal Access to Services     HERMINIO JI : Hadii aad ku hadhernano Sojoshuaali, waaxda luqadaha, qaybta kaalmada adeegyada, susanna arias kentrellrhonda valenzuela laValinda monahan. So [facility] [phone].    ATENCIÓN: Si habla español, tiene a suero disposición servicios gratuitos de asistencia lingüística. Llame al [phone].    We comply with applicable federal civil rights laws and [place] laws. We do not discriminate on the basis of race, color, national origin, age, disability, sex, sexual orientation, or gender identity.            Thank you!     Thank you for choosing [facility]  for your care. Our goal is always to provide you with excellent care. Hearing back from our patients is one way we can continue to improve our services. Please take a few minutes to complete the written survey that you may receive in the mail after your visit with us. Thank you!             Your Updated Medication List - Protect others around you: Learn how to safely use, store and throw away your medicines at www.disposemymeds.org.          This list is accurate as of 8/6/18  3:35 PM.  Always use your most recent med list.                   Brand Name Dispense Instructions for use Diagnosis    albuterol 108 (90 Base) MCG/ACT Inhaler    PROAIR HFA    1 Inhaler    Inhale 1 puff into the lungs 3 times daily For 3-5 days.        ALLERGEN IMMUNOTHERAPY PRESCRIPTION     5 mL    Continue SCIT per standard build-up protocol.  Once patient reaches red vial maintenance, continue weekly 0.5 mL red vial maintenance injections x 1 year.  Follow standard maintenance protocols.    Perennial allergic rhinitis       azelastine-fluticasone 137-50 MCG/ACT nasal spray    DYMISTA    2 Bottle    Spray 1 spray into both nostrils 2 times daily    Perennial allergic rhinitis with seasonal variation, Seasonal allergic  rhinitis, unspecified allergic rhinitis trigger       budesonide 0.5 MG/2ML neb solution    PULMICORT    3 Box    Squirt entire vial into previously made rosalba med saline bottle, mix, and irrigate each nostril until entire bottle empty.  Do this twice daily.    Post-operative state       budesonide-formoterol 160-4.5 MCG/ACT Inhaler    SYMBICORT     Inhale 2 puffs into the lungs 2 times daily        cetirizine 10 MG tablet    zyrTEC    30 tablet    Take 1 tablet (10 mg) by mouth every evening    Allergic rhinitis due to pollen, unspecified chronicity, unspecified seasonality, Seasonal allergic rhinitis, unspecified chronicity, unspecified trigger, Perennial allergic rhinitis with seasonal variation       dexlansoprazole 60 MG Cpdr CR capsule    DEXILANT     Take 60 mg by mouth daily        EPINEPHrine 0.3 MG/0.3ML injection    EPIPEN    2 each    Inject 0.3 mLs into the muscle once as needed for anaphylaxis for 1 dose.    Chronic rhinitis       fluticasone 50 MCG/ACT spray    FLONASE    16 g    Spray 2 sprays into both nostrils daily    Chronic rhinitis       loratadine 10 MG tablet    CLARITIN    30 tablet    TAKE 1 TABLET DAILY.    Allergic rhinitis due to pollen, Seasonal allergic rhinitis, Perennial allergic rhinitis with seasonal variation       montelukast 10 MG tablet    SINGULAIR    90 tablet    TAKE ONE TABLET AT BEDTIME.    Perennial allergic rhinitis with seasonal variation, Seasonal allergic rhinitis, Allergic rhinitis due to pollen       * multivitamin, therapeutic with minerals Tabs tablet      Take 1 tablet by mouth daily        * Multi-vitamin Tabs tablet           pantoprazole sodium 40 MG packet    PROTONIX     Take 40 mg by mouth        sinus rinse bottle     1 each    Use high volume rosalba med saline irrigation. Use warm distilled water and 2 packets of the salt solution that comes with the bottle, dissolve in bottle up to 240 ml lana. Irrigate each side of your nose leaning over the sink, using  1/3 to 1/2 the volume of the bottle in each nostril every irrigation.  Irrigate 5 times daily and as needed.  Add budesonide as indicated    Post-operative state       Vitamin D (Cholecalciferol) 1000 units Tabs      Take 2,000 Units by mouth        vitamin D 28113 UNIT capsule    ERGOCALCIFEROL     Take 2,000 Units by mouth daily        * Notice:  This list has 2 medication(s) that are the same as other medications prescribed for you. Read the directions carefully, and ask your doctor or other care provider to review them with you.

## 2018-08-17 ENCOUNTER — ALLIED HEALTH/NURSE VISIT (OUTPATIENT)
Dept: ALLERGY | Facility: OTHER | Age: 38
End: 2018-08-17
Attending: PHYSICIAN ASSISTANT
Payer: COMMERCIAL

## 2018-08-17 DIAGNOSIS — J30.9 ALLERGIC RHINITIS: Primary | ICD-10-CM

## 2018-08-17 PROCEDURE — 95115 IMMUNOTHERAPY ONE INJECTION: CPT

## 2018-08-17 NOTE — MR AVS SNAPSHOT
After Visit Summary   8/17/2018    Sydni Isabel    MRN: [id number]           Patient Information     Date Of Birth          1980        Visit Information        Provider Department      8/17/2018 2:15 PM HC SHOT ROOM [place] [facility]        Today's Diagnoses     Allergic rhinitis    -  1       Follow-ups after your visit        Who to contact     If you have questions or need follow up information about today's clinic visit or your schedule please contact [facility] directly at [phone].  Normal or non-critical lab and imaging results will be communicated to you by MyChart, letter or phone within 4 business days after the clinic has received the results. If you do not hear from us within 7 days, please contact the clinic through [employer] or phone. If you have a critical or abnormal lab result, we will notify you by phone as soon as possible.  Submit refill requests through [employer] or call your pharmacy and they will forward the refill request to us. Please allow 3 business days for your refill to be completed.          Additional Information About Your Visit        MyChart Information     [employer] gives you secure access to your electronic health record. If you see a primary care provider, you can also send messages to your care team and make appointments. If you have questions, please call your primary care clinic.  If you do not have a primary care provider, please call [phone] and they will assist you.        Care EveryWhere ID     This is your Care EveryWhere ID. This could be used by other organizations to access your [place] medical records  [id number]         Blood Pressure from Last 3 Encounters:   01/11/18 112/70   12/27/17 114/73   12/20/17 115/77    Weight from Last 3 Encounters:   01/11/18 174 lb (78.9 kg)   12/27/17 174 lb (78.9 kg)   12/20/17 174 lb (78.9 kg)              We Performed the Following     Allergy Shot: One injection        Primary Care  Provider Office Phone # Fax #    Go [facility] [phone]       [facility] [address]  [facility] [address]        Equal Access to Services     HERMINIO JI : Hadii alonzo ku hadhernano Sojoshuaali, waaxda luqadaha, qaybta kaalmada adeshawnada, susanna arias kentrellrhonda hardwickerica monahan. So [facility] [phone].    ATENCIÓN: Si habla español, tiene a usero disposición servicios gratuitos de asistencia lingüística. Llame al [phone].    We comply with applicable federal civil rights laws and [place] laws. We do not discriminate on the basis of race, color, national origin, age, disability, sex, sexual orientation, or gender identity.            Thank you!     Thank you for choosing [facility]  for your care. Our goal is always to provide you with excellent care. Hearing back from our patients is one way we can continue to improve our services. Please take a few minutes to complete the written survey that you may receive in the mail after your visit with us. Thank you!             Your Updated Medication List - Protect others around you: Learn how to safely use, store and throw away your medicines at www.disposemymeds.org.          This list is accurate as of 8/17/18  2:25 PM.  Always use your most recent med list.                   Brand Name Dispense Instructions for use Diagnosis    albuterol 108 (90 Base) MCG/ACT inhaler    PROAIR HFA    1 Inhaler    Inhale 1 puff into the lungs 3 times daily For 3-5 days.        azelastine-fluticasone 137-50 MCG/ACT nasal spray    DYMISTA    2 Bottle    Spray 1 spray into both nostrils 2 times daily    Perennial allergic rhinitis with seasonal variation, Seasonal allergic rhinitis, unspecified allergic rhinitis trigger       budesonide 0.5 MG/2ML neb solution    PULMICORT    3 Box    Squirt entire vial into previously made rosalba med saline bottle, mix, and irrigate each nostril until entire bottle empty.  Do this twice daily.    Post-operative  state       budesonide-formoterol 160-4.5 MCG/ACT Inhaler    SYMBICORT     Inhale 2 puffs into the lungs 2 times daily        cetirizine 10 MG tablet    zyrTEC    30 tablet    Take 1 tablet (10 mg) by mouth every evening    Allergic rhinitis due to pollen, unspecified chronicity, unspecified seasonality, Seasonal allergic rhinitis, unspecified chronicity, unspecified trigger, Perennial allergic rhinitis with seasonal variation       dexlansoprazole 60 MG Cpdr CR capsule    DEXILANT     Take 60 mg by mouth daily        EPINEPHrine 0.3 MG/0.3ML injection    EPIPEN    2 each    Inject 0.3 mLs into the muscle once as needed for anaphylaxis for 1 dose.    Chronic rhinitis       fluticasone 50 MCG/ACT spray    FLONASE    16 g    Spray 2 sprays into both nostrils daily    Chronic rhinitis       loratadine 10 MG tablet    CLARITIN    30 tablet    TAKE 1 TABLET DAILY.    Allergic rhinitis due to pollen, Seasonal allergic rhinitis, Perennial allergic rhinitis with seasonal variation       montelukast 10 MG tablet    SINGULAIR    90 tablet    TAKE ONE TABLET AT BEDTIME.    Perennial allergic rhinitis with seasonal variation, Seasonal allergic rhinitis, Allergic rhinitis due to pollen       * multivitamin, therapeutic with minerals Tabs tablet      Take 1 tablet by mouth daily        * Multi-vitamin Tabs tablet           ORDER FOR ALLERGEN IMMUNOTHERAPY 5 mL vial     5 mL    Continue SCIT per standard build-up protocol.  Once patient reaches red vial maintenance, continue weekly 0.5 mL red vial maintenance injections x 1 year.  Follow standard maintenance protocols.    Perennial allergic rhinitis       pantoprazole sodium 40 MG packet    PROTONIX     Take 40 mg by mouth        sinus rinse bottle     1 each    Use high volume rosalba med saline irrigation. Use warm distilled water and 2 packets of the salt solution that comes with the bottle, dissolve in bottle up to 240 ml lana. Irrigate each side of your nose leaning over the  sink, using 1/3 to 1/2 the volume of the bottle in each nostril every irrigation.  Irrigate 5 times daily and as needed.  Add budesonide as indicated    Post-operative state       Vitamin D (Cholecalciferol) 1000 units Tabs      Take 2,000 Units by mouth        vitamin D 60670 UNIT capsule    ERGOCALCIFEROL     Take 2,000 Units by mouth daily        * Notice:  This list has 2 medication(s) that are the same as other medications prescribed for you. Read the directions carefully, and ask your doctor or other care provider to review them with you.

## 2018-08-17 NOTE — PROGRESS NOTES
Allergy injection/s given and charted on paper allergy flow sheet.  Patient left AMA and did not remain for observation. Consent form signed.  Tess Cool

## 2018-08-29 ENCOUNTER — ALLIED HEALTH/NURSE VISIT (OUTPATIENT)
Dept: ALLERGY | Facility: OTHER | Age: 38
End: 2018-08-29
Attending: PHYSICIAN ASSISTANT
Payer: COMMERCIAL

## 2018-08-29 DIAGNOSIS — J30.9 ALLERGIC RHINITIS: Primary | ICD-10-CM

## 2018-08-29 PROCEDURE — 95115 IMMUNOTHERAPY ONE INJECTION: CPT

## 2018-08-29 NOTE — MR AVS SNAPSHOT
After Visit Summary   8/29/2018    Sydni Isabel    MRN: [id number]           Patient Information     Date Of Birth          1980        Visit Information        Provider Department      8/29/2018 4:30 PM HC SHOT ROOM [place] [facility]        Today's Diagnoses     Allergic rhinitis    -  1       Follow-ups after your visit        Who to contact     If you have questions or need follow up information about today's clinic visit or your schedule please contact [facility] directly at [phone].  Normal or non-critical lab and imaging results will be communicated to you by MyChart, letter or phone within 4 business days after the clinic has received the results. If you do not hear from us within 7 days, please contact the clinic through [employer] or phone. If you have a critical or abnormal lab result, we will notify you by phone as soon as possible.  Submit refill requests through [employer] or call your pharmacy and they will forward the refill request to us. Please allow 3 business days for your refill to be completed.          Additional Information About Your Visit        MyChart Information     [employer] gives you secure access to your electronic health record. If you see a primary care provider, you can also send messages to your care team and make appointments. If you have questions, please call your primary care clinic.  If you do not have a primary care provider, please call [phone] and they will assist you.        Care EveryWhere ID     This is your Care EveryWhere ID. This could be used by other organizations to access your [place] medical records  [id number]         Blood Pressure from Last 3 Encounters:   01/11/18 112/70   12/27/17 114/73   12/20/17 115/77    Weight from Last 3 Encounters:   01/11/18 174 lb (78.9 kg)   12/27/17 174 lb (78.9 kg)   12/20/17 174 lb (78.9 kg)              We Performed the Following     Allergy Shot: One injection        Primary Care  Provider Office Phone # Fax #    Go [facility] [phone]       [facility] [address]  [facility] [address]        Equal Access to Services     HERMINIO JI : Hadii alonzo ku hadhernano Sojoshuaali, waaxda luqadaha, qaybta kaalmada adeshawnada, susanna arias kentrellrhonda hardwickerica monahan. So [facility] [phone].    ATENCIÓN: Si habla español, tiene a suero disposición servicios gratuitos de asistencia lingüística. Llame al [phone].    We comply with applicable federal civil rights laws and [place] laws. We do not discriminate on the basis of race, color, national origin, age, disability, sex, sexual orientation, or gender identity.            Thank you!     Thank you for choosing [facility]  for your care. Our goal is always to provide you with excellent care. Hearing back from our patients is one way we can continue to improve our services. Please take a few minutes to complete the written survey that you may receive in the mail after your visit with us. Thank you!             Your Updated Medication List - Protect others around you: Learn how to safely use, store and throw away your medicines at www.disposemymeds.org.          This list is accurate as of 8/29/18  4:54 PM.  Always use your most recent med list.                   Brand Name Dispense Instructions for use Diagnosis    albuterol 108 (90 Base) MCG/ACT inhaler    PROAIR HFA    1 Inhaler    Inhale 1 puff into the lungs 3 times daily For 3-5 days.        azelastine-fluticasone 137-50 MCG/ACT nasal spray    DYMISTA    2 Bottle    Spray 1 spray into both nostrils 2 times daily    Perennial allergic rhinitis with seasonal variation, Seasonal allergic rhinitis, unspecified allergic rhinitis trigger       budesonide 0.5 MG/2ML neb solution    PULMICORT    3 Box    Squirt entire vial into previously made rosalba med saline bottle, mix, and irrigate each nostril until entire bottle empty.  Do this twice daily.    Post-operative  state       budesonide-formoterol 160-4.5 MCG/ACT Inhaler    SYMBICORT     Inhale 2 puffs into the lungs 2 times daily        cetirizine 10 MG tablet    zyrTEC    30 tablet    Take 1 tablet (10 mg) by mouth every evening    Allergic rhinitis due to pollen, unspecified chronicity, unspecified seasonality, Seasonal allergic rhinitis, unspecified chronicity, unspecified trigger, Perennial allergic rhinitis with seasonal variation       dexlansoprazole 60 MG Cpdr CR capsule    DEXILANT     Take 60 mg by mouth daily        EPINEPHrine 0.3 MG/0.3ML injection    EPIPEN    2 each    Inject 0.3 mLs into the muscle once as needed for anaphylaxis for 1 dose.    Chronic rhinitis       fluticasone 50 MCG/ACT spray    FLONASE    16 g    Spray 2 sprays into both nostrils daily    Chronic rhinitis       loratadine 10 MG tablet    CLARITIN    30 tablet    TAKE 1 TABLET DAILY.    Allergic rhinitis due to pollen, Seasonal allergic rhinitis, Perennial allergic rhinitis with seasonal variation       montelukast 10 MG tablet    SINGULAIR    90 tablet    TAKE ONE TABLET AT BEDTIME.    Perennial allergic rhinitis with seasonal variation, Seasonal allergic rhinitis, Allergic rhinitis due to pollen       * multivitamin, therapeutic with minerals Tabs tablet      Take 1 tablet by mouth daily        * Multi-vitamin Tabs tablet           ORDER FOR ALLERGEN IMMUNOTHERAPY 5 mL vial     5 mL    Continue SCIT per standard build-up protocol.  Once patient reaches red vial maintenance, continue weekly 0.5 mL red vial maintenance injections x 1 year.  Follow standard maintenance protocols.    Perennial allergic rhinitis       pantoprazole sodium 40 MG packet    PROTONIX     Take 40 mg by mouth        sinus rinse bottle     1 each    Use high volume rosalba med saline irrigation. Use warm distilled water and 2 packets of the salt solution that comes with the bottle, dissolve in bottle up to 240 ml lana. Irrigate each side of your nose leaning over the  sink, using 1/3 to 1/2 the volume of the bottle in each nostril every irrigation.  Irrigate 5 times daily and as needed.  Add budesonide as indicated    Post-operative state       Vitamin D (Cholecalciferol) 1000 units Tabs      Take 2,000 Units by mouth        vitamin D 78376 UNIT capsule    ERGOCALCIFEROL     Take 2,000 Units by mouth daily        * Notice:  This list has 2 medication(s) that are the same as other medications prescribed for you. Read the directions carefully, and ask your doctor or other care provider to review them with you.

## 2018-09-10 ENCOUNTER — ALLIED HEALTH/NURSE VISIT (OUTPATIENT)
Dept: ALLERGY | Facility: OTHER | Age: 38
End: 2018-09-10
Attending: PHYSICIAN ASSISTANT
Payer: COMMERCIAL

## 2018-09-10 DIAGNOSIS — J30.9 ALLERGIC RHINITIS: Primary | ICD-10-CM

## 2018-09-10 PROCEDURE — 95115 IMMUNOTHERAPY ONE INJECTION: CPT

## 2018-09-10 NOTE — PROGRESS NOTES
Prior to injection verified patient identity using patient's name and date of birth.  Due to injection administration, patient instructed to remain in clinic for 15 minutes  afterwards, and to report any adverse reaction to me immediately.  Allergy injection/s given and charted on paper allergy flow sheet. Pt signed out AMA and did not remain for observation..  SELENA VILLANUEVA

## 2018-09-10 NOTE — MR AVS SNAPSHOT
After Visit Summary   9/10/2018    Sydni Isabel    MRN: [id number]           Patient Information     Date Of Birth          1980        Visit Information        Provider Department      9/10/2018 2:45 PM HC SHOT ROOM [place] [facility]        Today's Diagnoses     Allergic rhinitis    -  1       Follow-ups after your visit        Who to contact     If you have questions or need follow up information about today's clinic visit or your schedule please contact [facility] directly at [phone].  Normal or non-critical lab and imaging results will be communicated to you by MyChart, letter or phone within 4 business days after the clinic has received the results. If you do not hear from us within 7 days, please contact the clinic through [employer] or phone. If you have a critical or abnormal lab result, we will notify you by phone as soon as possible.  Submit refill requests through [employer] or call your pharmacy and they will forward the refill request to us. Please allow 3 business days for your refill to be completed.          Additional Information About Your Visit        MyChart Information     [employer] gives you secure access to your electronic health record. If you see a primary care provider, you can also send messages to your care team and make appointments. If you have questions, please call your primary care clinic.  If you do not have a primary care provider, please call [phone] and they will assist you.        Care EveryWhere ID     This is your Care EveryWhere ID. This could be used by other organizations to access your [place] medical records  [id number]         Blood Pressure from Last 3 Encounters:   01/11/18 112/70   12/27/17 114/73   12/20/17 115/77    Weight from Last 3 Encounters:   01/11/18 174 lb (78.9 kg)   12/27/17 174 lb (78.9 kg)   12/20/17 174 lb (78.9 kg)              We Performed the Following     Allergy Shot: One injection        Primary Care  Provider Office Phone # Fax #    Go Villarreal [phone]       [facility] [address]  [facility] [address]        Equal Access to Services     HERMINIO JI : Hadii alonzo ku hadhernano Sojoshuaali, waaxda luqadaha, qaybta kaalmada adeshawnada, susanna arias kentrellrhonda hardwickerica monahan. So [facility] [phone].    ATENCIÓN: Si habla español, tiene a suero disposición servicios gratuitos de asistencia lingüística. Llame al [phone].    We comply with applicable federal civil rights laws and [place] laws. We do not discriminate on the basis of race, color, national origin, age, disability, sex, sexual orientation, or gender identity.            Thank you!     Thank you for choosing [facility]  for your care. Our goal is always to provide you with excellent care. Hearing back from our patients is one way we can continue to improve our services. Please take a few minutes to complete the written survey that you may receive in the mail after your visit with us. Thank you!             Your Updated Medication List - Protect others around you: Learn how to safely use, store and throw away your medicines at www.disposemymeds.org.          This list is accurate as of 9/10/18  3:14 PM.  Always use your most recent med list.                   Brand Name Dispense Instructions for use Diagnosis    albuterol 108 (90 Base) MCG/ACT inhaler    PROAIR HFA    1 Inhaler    Inhale 1 puff into the lungs 3 times daily For 3-5 days.        azelastine-fluticasone 137-50 MCG/ACT nasal spray    DYMISTA    2 Bottle    Spray 1 spray into both nostrils 2 times daily    Perennial allergic rhinitis with seasonal variation, Seasonal allergic rhinitis, unspecified allergic rhinitis trigger       budesonide 0.5 MG/2ML neb solution    PULMICORT    3 Box    Squirt entire vial into previously made rosalba med saline bottle, mix, and irrigate each nostril until entire bottle empty.  Do this twice daily.    Post-operative  state       budesonide-formoterol 160-4.5 MCG/ACT Inhaler    SYMBICORT     Inhale 2 puffs into the lungs 2 times daily        cetirizine 10 MG tablet    zyrTEC    30 tablet    Take 1 tablet (10 mg) by mouth every evening    Allergic rhinitis due to pollen, unspecified chronicity, unspecified seasonality, Seasonal allergic rhinitis, unspecified chronicity, unspecified trigger, Perennial allergic rhinitis with seasonal variation       dexlansoprazole 60 MG Cpdr CR capsule    DEXILANT     Take 60 mg by mouth daily        EPINEPHrine 0.3 MG/0.3ML injection    EPIPEN    2 each    Inject 0.3 mLs into the muscle once as needed for anaphylaxis for 1 dose.    Chronic rhinitis       fluticasone 50 MCG/ACT spray    FLONASE    16 g    Spray 2 sprays into both nostrils daily    Chronic rhinitis       loratadine 10 MG tablet    CLARITIN    30 tablet    TAKE 1 TABLET DAILY.    Allergic rhinitis due to pollen, Seasonal allergic rhinitis, Perennial allergic rhinitis with seasonal variation       montelukast 10 MG tablet    SINGULAIR    90 tablet    TAKE ONE TABLET AT BEDTIME.    Perennial allergic rhinitis with seasonal variation, Seasonal allergic rhinitis, Allergic rhinitis due to pollen       * multivitamin, therapeutic with minerals Tabs tablet      Take 1 tablet by mouth daily        * Multi-vitamin Tabs tablet           ORDER FOR ALLERGEN IMMUNOTHERAPY 5 mL vial     5 mL    Continue SCIT per standard build-up protocol.  Once patient reaches red vial maintenance, continue weekly 0.5 mL red vial maintenance injections x 1 year.  Follow standard maintenance protocols.    Perennial allergic rhinitis       pantoprazole sodium 40 MG packet    PROTONIX     Take 40 mg by mouth        sinus rinse bottle     1 each    Use high volume rosalba med saline irrigation. Use warm distilled water and 2 packets of the salt solution that comes with the bottle, dissolve in bottle up to 240 ml lana. Irrigate each side of your nose leaning over the  sink, using 1/3 to 1/2 the volume of the bottle in each nostril every irrigation.  Irrigate 5 times daily and as needed.  Add budesonide as indicated    Post-operative state       Vitamin D (Cholecalciferol) 1000 units Tabs      Take 2,000 Units by mouth        vitamin D 50221 UNIT capsule    ERGOCALCIFEROL     Take 2,000 Units by mouth daily        * Notice:  This list has 2 medication(s) that are the same as other medications prescribed for you. Read the directions carefully, and ask your doctor or other care provider to review them with you.

## 2018-09-28 ENCOUNTER — ALLIED HEALTH/NURSE VISIT (OUTPATIENT)
Dept: ALLERGY | Facility: OTHER | Age: 38
End: 2018-09-28
Attending: PHYSICIAN ASSISTANT
Payer: COMMERCIAL

## 2018-09-28 DIAGNOSIS — J30.9 ALLERGIC RHINITIS: Primary | ICD-10-CM

## 2018-09-28 PROCEDURE — 95115 IMMUNOTHERAPY ONE INJECTION: CPT

## 2018-09-28 NOTE — MR AVS SNAPSHOT
After Visit Summary   9/28/2018    Sydni Isabel    MRN: [id number]           Patient Information     Date Of Birth          1980        Visit Information        Provider Department      9/28/2018 2:15 PM HC SHOT ROOM [facility] [place]        Today's Diagnoses     Allergic rhinitis    -  1       Follow-ups after your visit        Your next 10 appointments already scheduled     Sep 28, 2018  2:15 PM CDT   injection with HC SHOT ROOM   [facility] [place] [facility] [place] [facility]    [address]  [place] [address]   [phone]            Oct 05, 2018  4:00 PM CDT   injection with HC SHOT ROOM   [facility] [place] ([facility] [place] [facility]    [address]  [place] [address]   [phone]              Who to contact     If you have questions or need follow up information about today's clinic visit or your schedule please contact [facility] directly at [phone].  Normal or non-critical lab and imaging results will be communicated to you by [employer], letter or phone within 4 business days after the clinic has received the results. If you do not hear from us within 7 days, please contact the clinic through [employer] or phone. If you have a critical or abnormal lab result, we will notify you by phone as soon as possible.  Submit refill requests through [employer] or call your pharmacy and they will forward the refill request to us. Please allow 3 business days for your refill to be completed.          Additional Information About Your Visit        Ph[employer]art Information     [employer] gives you secure access to your electronic health record. If you see a primary care provider, you can also send messages to your care team and make appointments. If you have questions, please call your primary care clinic.  If you do not have a primary care provider, please call [phone] and they will assist you.        Care  EveryWhere ID     This is your Care EveryWhere ID. This could be used by other organizations to access your [place] medical records  [id number]         Blood Pressure from Last 3 Encounters:   01/11/18 112/70   12/27/17 114/73   12/20/17 115/77    Weight from Last 3 Encounters:   01/11/18 174 lb (78.9 kg)   12/27/17 174 lb (78.9 kg)   12/20/17 174 lb (78.9 kg)              We Performed the Following     Allergy Shot: One injection        Primary Care Provider Office Phone # Fax #    Go Villarreal [phone]       [facility] [address]  [facility] [address]        Equal Access to Services     HERMINIO JI : Hadii alonzo justino Sojoanne, waaxda luqadaha, qaybta kaalmada adeegyada, susanna maier . So [facility] [phone].    ATENCIÓN: Si habla español, tiene a suero disposición servicios gratuitos de asistencia lingüística. [facility] [phone].    We comply with applicable federal civil rights laws and [place] laws. We do not discriminate on the basis of race, color, national origin, age, disability, sex, sexual orientation, or gender identity.            Thank you!     Thank you for choosing [facility]  for your care. Our goal is always to provide you with excellent care. Hearing back from our patients is one way we can continue to improve our services. Please take a few minutes to complete the written survey that you may receive in the mail after your visit with us. Thank you!             Your Updated Medication List - Protect others around you: Learn how to safely use, store and throw away your medicines at www.disposemymeds.org.          This list is accurate as of 9/28/18  2:02 PM.  Always use your most recent med list.                   Brand Name Dispense Instructions for use Diagnosis    albuterol 108 (90 Base) MCG/ACT inhaler    PROAIR HFA    1 Inhaler    Inhale 1 puff into the lungs 3 times daily For 3-5 days.         azelastine-fluticasone 137-50 MCG/ACT nasal spray    DYMISTA    2 Bottle    Spray 1 spray into both nostrils 2 times daily    Perennial allergic rhinitis with seasonal variation, Seasonal allergic rhinitis, unspecified allergic rhinitis trigger       budesonide 0.5 MG/2ML neb solution    PULMICORT    3 Box    Squirt entire vial into previously made rosalba med saline bottle, mix, and irrigate each nostril until entire bottle empty.  Do this twice daily.    Post-operative state       budesonide-formoterol 160-4.5 MCG/ACT Inhaler    SYMBICORT     Inhale 2 puffs into the lungs 2 times daily        cetirizine 10 MG tablet    zyrTEC    30 tablet    Take 1 tablet (10 mg) by mouth every evening    Allergic rhinitis due to pollen, unspecified chronicity, unspecified seasonality, Seasonal allergic rhinitis, unspecified chronicity, unspecified trigger, Perennial allergic rhinitis with seasonal variation       dexlansoprazole 60 MG Cpdr CR capsule    DEXILANT     Take 60 mg by mouth daily        EPINEPHrine 0.3 MG/0.3ML injection    EPIPEN    2 each    Inject 0.3 mLs into the muscle once as needed for anaphylaxis for 1 dose.    Chronic rhinitis       fluticasone 50 MCG/ACT spray    FLONASE    16 g    Spray 2 sprays into both nostrils daily    Chronic rhinitis       loratadine 10 MG tablet    CLARITIN    30 tablet    TAKE 1 TABLET DAILY.    Allergic rhinitis due to pollen, Seasonal allergic rhinitis, Perennial allergic rhinitis with seasonal variation       montelukast 10 MG tablet    SINGULAIR    90 tablet    TAKE ONE TABLET AT BEDTIME.    Perennial allergic rhinitis with seasonal variation, Seasonal allergic rhinitis, Allergic rhinitis due to pollen       * multivitamin, therapeutic with minerals Tabs tablet      Take 1 tablet by mouth daily        * Multi-vitamin Tabs tablet           ORDER FOR ALLERGEN IMMUNOTHERAPY 5 mL vial     5 mL    Continue SCIT per standard build-up protocol.  Once patient reaches red vial maintenance,  continue weekly 0.5 mL red vial maintenance injections x 1 year.  Follow standard maintenance protocols.    Perennial allergic rhinitis       pantoprazole sodium 40 MG packet    PROTONIX     Take 40 mg by mouth        sinus rinse bottle     1 each    Use high volume rosalba med saline irrigation. Use warm distilled water and 2 packets of the salt solution that comes with the bottle, dissolve in bottle up to 240 ml lana. Irrigate each side of your nose leaning over the sink, using 1/3 to 1/2 the volume of the bottle in each nostril every irrigation.  Irrigate 5 times daily and as needed.  Add budesonide as indicated    Post-operative state       Vitamin D (Cholecalciferol) 1000 units Tabs      Take 2,000 Units by mouth        vitamin D 87765 UNIT capsule    ERGOCALCIFEROL     Take 2,000 Units by mouth daily        * Notice:  This list has 2 medication(s) that are the same as other medications prescribed for you. Read the directions carefully, and ask your doctor or other care provider to review them with you.

## 2018-10-05 ENCOUNTER — ALLIED HEALTH/NURSE VISIT (OUTPATIENT)
Dept: ALLERGY | Facility: OTHER | Age: 38
End: 2018-10-05
Attending: PHYSICIAN ASSISTANT
Payer: COMMERCIAL

## 2018-10-05 DIAGNOSIS — J30.9 ALLERGIC RHINITIS: Primary | ICD-10-CM

## 2018-10-05 PROCEDURE — 95115 IMMUNOTHERAPY ONE INJECTION: CPT

## 2018-10-05 NOTE — MR AVS SNAPSHOT
After Visit Summary   10/5/2018    Sydni Isabel    MRN: [id number]           Patient Information     Date Of Birth          1980        Visit Information        Provider Department      10/5/2018 4:00 PM HC SHOT ROOM [facility]        Today's Diagnoses     Allergic rhinitis    -  1       Follow-ups after your visit        Who to contact     If you have questions or need follow up information about today's clinic visit or your schedule please contact [facility] directly at [phone].  Normal or non-critical lab and imaging results will be communicated to you by MyChart, letter or phone within 4 business days after the clinic has received the results. If you do not hear from us within 7 days, please contact the clinic through [employer] or phone. If you have a critical or abnormal lab result, we will notify you by phone as soon as possible.  Submit refill requests through [employer] or call your pharmacy and they will forward the refill request to us. Please allow 3 business days for your refill to be completed.          Additional Information About Your Visit        MyChart Information     [employer] gives you secure access to your electronic health record. If you see a primary care provider, you can also send messages to your care team and make appointments. If you have questions, please call your primary care clinic.  If you do not have a primary care provider, please call [phone] and they will assist you.        Care EveryWhere ID     This is your Care EveryWhere ID. This could be used by other organizations to access your [place] medical records  [id number]         Blood Pressure from Last 3 Encounters:   01/11/18 112/70   12/27/17 114/73   12/20/17 115/77    Weight from Last 3 Encounters:   01/11/18 174 lb (78.9 kg)   12/27/17 174 lb (78.9 kg)   12/20/17 174 lb (78.9 kg)              We Performed the Following     Allergy Shot: One injection         Primary Care Provider Office Phone # Fax #    Go Villarreal [phone]       [facility] [address]  [facility] [address]        Equal Access to Services     HERMINIO JI : Hadii aad ku hadhernano Soomaali, waaxda luqadaha, qaybta kaalmada adebrad, susanna sanfordjavid pfeifferrhonda hardwickerica monahan. So [facility] [phone].    ATENCIÓN: Si habla español, tiene a suero disposición servicios gratuitos de asistencia lingüística. Llame al [phone].    We comply with applicable federal civil rights laws and [place] laws. We do not discriminate on the basis of race, color, national origin, age, disability, sex, sexual orientation, or gender identity.            Thank you!     Thank you for choosing [facility]  for your care. Our goal is always to provide you with excellent care. Hearing back from our patients is one way we can continue to improve our services. Please take a few minutes to complete the written survey that you may receive in the mail after your visit with us. Thank you!             Your Updated Medication List - Protect others around you: Learn how to safely use, store and throw away your medicines at www.disposemymeds.org.          This list is accurate as of 10/5/18  4:00 PM.  Always use your most recent med list.                   Brand Name Dispense Instructions for use Diagnosis    albuterol 108 (90 Base) MCG/ACT inhaler    PROAIR HFA    1 Inhaler    Inhale 1 puff into the lungs 3 times daily For 3-5 days.        azelastine-fluticasone 137-50 MCG/ACT nasal spray    DYMISTA    2 Bottle    Spray 1 spray into both nostrils 2 times daily    Perennial allergic rhinitis with seasonal variation, Seasonal allergic rhinitis, unspecified allergic rhinitis trigger       budesonide 0.5 MG/2ML neb solution    PULMICORT    3 Box    Squirt entire vial into previously made rosalba med saline bottle, mix, and irrigate each nostril until entire bottle empty.  Do this twice  daily.    Post-operative state       budesonide-formoterol 160-4.5 MCG/ACT Inhaler    SYMBICORT     Inhale 2 puffs into the lungs 2 times daily        cetirizine 10 MG tablet    zyrTEC    30 tablet    Take 1 tablet (10 mg) by mouth every evening    Allergic rhinitis due to pollen, unspecified chronicity, unspecified seasonality, Seasonal allergic rhinitis, unspecified chronicity, unspecified trigger, Perennial allergic rhinitis with seasonal variation       dexlansoprazole 60 MG Cpdr CR capsule    DEXILANT     Take 60 mg by mouth daily        EPINEPHrine 0.3 MG/0.3ML injection    EPIPEN    2 each    Inject 0.3 mLs into the muscle once as needed for anaphylaxis for 1 dose.    Chronic rhinitis       fluticasone 50 MCG/ACT spray    FLONASE    16 g    Spray 2 sprays into both nostrils daily    Chronic rhinitis       loratadine 10 MG tablet    CLARITIN    30 tablet    TAKE 1 TABLET DAILY.    Allergic rhinitis due to pollen, Seasonal allergic rhinitis, Perennial allergic rhinitis with seasonal variation       montelukast 10 MG tablet    SINGULAIR    90 tablet    TAKE ONE TABLET AT BEDTIME.    Perennial allergic rhinitis with seasonal variation, Seasonal allergic rhinitis, Allergic rhinitis due to pollen       * multivitamin, therapeutic with minerals Tabs tablet      Take 1 tablet by mouth daily        * Multi-vitamin Tabs tablet           ORDER FOR ALLERGEN IMMUNOTHERAPY 5 mL vial     5 mL    Continue SCIT per standard build-up protocol.  Once patient reaches red vial maintenance, continue weekly 0.5 mL red vial maintenance injections x 1 year.  Follow standard maintenance protocols.    Perennial allergic rhinitis       pantoprazole sodium 40 MG packet    PROTONIX     Take 40 mg by mouth        sinus rinse bottle     1 each    Use high volume rosalba med saline irrigation. Use warm distilled water and 2 packets of the salt solution that comes with the bottle, dissolve in bottle up to 240 ml lana. Irrigate each side of your  nose leaning over the sink, using 1/3 to 1/2 the volume of the bottle in each nostril every irrigation.  Irrigate 5 times daily and as needed.  Add budesonide as indicated    Post-operative state       Vitamin D (Cholecalciferol) 1000 units Tabs      Take 2,000 Units by mouth        vitamin D 57106 UNIT capsule    ERGOCALCIFEROL     Take 2,000 Units by mouth daily        * Notice:  This list has 2 medication(s) that are the same as other medications prescribed for you. Read the directions carefully, and ask your doctor or other care provider to review them with you.

## 2018-10-10 ENCOUNTER — OFFICE VISIT (OUTPATIENT)
Dept: OBGYN | Age: 38
End: 2018-10-10

## 2018-10-10 VITALS
SYSTOLIC BLOOD PRESSURE: 104 MMHG | HEIGHT: 63 IN | DIASTOLIC BLOOD PRESSURE: 68 MMHG | WEIGHT: 149 LBS | HEART RATE: 66 BPM | BODY MASS INDEX: 26.4 KG/M2

## 2018-10-10 DIAGNOSIS — Z12.4 CERVICAL CANCER SCREENING: ICD-10-CM

## 2018-10-10 DIAGNOSIS — Z11.3 ROUTINE SCREENING FOR STI (SEXUALLY TRANSMITTED INFECTION): Primary | ICD-10-CM

## 2018-10-10 DIAGNOSIS — Z12.31 ENCOUNTER FOR SCREENING MAMMOGRAM FOR BREAST CANCER: ICD-10-CM

## 2018-10-10 DIAGNOSIS — Z01.419 WELL WOMAN EXAM WITH ROUTINE GYNECOLOGICAL EXAM: ICD-10-CM

## 2018-10-10 PROCEDURE — 87591 N.GONORRHOEAE DNA AMP PROB: CPT | Performed by: INTERNAL MEDICINE

## 2018-10-10 PROCEDURE — 87481 CANDIDA DNA AMP PROBE: CPT | Performed by: INTERNAL MEDICINE

## 2018-10-10 PROCEDURE — 87491 CHLMYD TRACH DNA AMP PROBE: CPT | Performed by: INTERNAL MEDICINE

## 2018-10-10 PROCEDURE — 87624 HPV HI-RISK TYP POOLED RSLT: CPT | Performed by: INTERNAL MEDICINE

## 2018-10-10 PROCEDURE — 99385 PREV VISIT NEW AGE 18-39: CPT | Performed by: OBSTETRICS & GYNECOLOGY

## 2018-10-10 PROCEDURE — 87661 TRICHOMONAS VAGINALIS AMPLIF: CPT | Performed by: INTERNAL MEDICINE

## 2018-10-10 PROCEDURE — 87801 DETECT AGNT MULT DNA AMPLI: CPT | Performed by: INTERNAL MEDICINE

## 2018-10-10 PROCEDURE — 88175 CYTOPATH C/V AUTO FLUID REDO: CPT | Performed by: PATHOLOGY

## 2018-10-10 ASSESSMENT — PATIENT HEALTH QUESTIONNAIRE - PHQ9
SUM OF ALL RESPONSES TO PHQ9 QUESTIONS 1 AND 2: 1
CLINICAL INTERPRETATION OF PHQ2 SCORE: 1

## 2018-10-12 LAB
BACTERIAL VAGINOSIS VAG-IMP: NOT DETECTED
C ALBICANS DNA VAG QL NAA+PROBE: NOT DETECTED
C GLABRATA DNA VAG QL NAA+PROBE: NOT DETECTED
C KRUSEI DNA VAG QL NAA+PROBE: NOT DETECTED
C TRACH RRNA SPEC QL NAA+PROBE: NEGATIVE
N GONORRHOEA RRNA SPEC QL NAA+PROBE: NEGATIVE
SPECIMEN SOURCE: NORMAL
SPECIMEN SOURCE: NORMAL
T VAGINALIS DNA VAG QL NAA+PROBE: NOT DETECTED

## 2018-10-15 ENCOUNTER — ALLIED HEALTH/NURSE VISIT (OUTPATIENT)
Dept: ALLERGY | Facility: OTHER | Age: 38
End: 2018-10-15
Attending: PHYSICIAN ASSISTANT
Payer: COMMERCIAL

## 2018-10-15 DIAGNOSIS — J30.9 ALLERGIC RHINITIS: Primary | ICD-10-CM

## 2018-10-15 PROCEDURE — 95115 IMMUNOTHERAPY ONE INJECTION: CPT

## 2018-10-15 NOTE — MR AVS SNAPSHOT
After Visit Summary   10/15/2018    Sydni Isabel    MRN: [id number]           Patient Information     Date Of Birth          1980        Visit Information        Provider Department      10/15/2018 3:00 PM HC SHOT ROOM [facility]        Today's Diagnoses     Allergic rhinitis    -  1       Follow-ups after your visit        Who to contact     If you have questions or need follow up information about today's clinic visit or your schedule please contact [facility] directly at [phone].  Normal or non-critical lab and imaging results will be communicated to you by MyChart, letter or phone within 4 business days after the clinic has received the results. If you do not hear from us within 7 days, please contact the clinic through Zen[employer] or phone. If you have a critical or abnormal lab result, we will notify you by phone as soon as possible.  Submit refill requests through [employer] or call your pharmacy and they will forward the refill request to us. Please allow 3 business days for your refill to be completed.          Additional Information About Your Visit        MyChart Information     [employer] gives you secure access to your electronic health record. If you see a primary care provider, you can also send messages to your care team and make appointments. If you have questions, please call your primary care clinic.  If you do not have a primary care provider, please call [phone] and they will assist you.        Care EveryWhere ID     This is your Care EveryWhere ID. This could be used by other organizations to access your [place] medical records  [id number]         Blood Pressure from Last 3 Encounters:   01/11/18 112/70   12/27/17 114/73   12/20/17 115/77    Weight from Last 3 Encounters:   01/11/18 174 lb (78.9 kg)   12/27/17 174 lb (78.9 kg)   12/20/17 174 lb (78.9 kg)              We Performed the Following     Allergy Shot: One injection         Primary Care Provider Office Phone # Fax #    Go Villarreal [phone]       [facility] [address]  [facility] [address]        Equal Access to Services     HERMINIO JI : Hadii aad ku hadhernano Soomaali, waaxda luqadaha, qaybta kaalmada adebrad, susanna sanfordjavid pfeifferrhonda hardwickerica monahan. So [facility] [phone].    ATENCIÓN: Si habla español, tiene a suero disposición servicios gratuitos de asistencia lingüística. Llame al [phone].    We comply with applicable federal civil rights laws and [place] laws. We do not discriminate on the basis of race, color, national origin, age, disability, sex, sexual orientation, or gender identity.            Thank you!     Thank you for choosing [facility]  for your care. Our goal is always to provide you with excellent care. Hearing back from our patients is one way we can continue to improve our services. Please take a few minutes to complete the written survey that you may receive in the mail after your visit with us. Thank you!             Your Updated Medication List - Protect others around you: Learn how to safely use, store and throw away your medicines at www.disposemymeds.org.          This list is accurate as of 10/15/18  3:25 PM.  Always use your most recent med list.                   Brand Name Dispense Instructions for use Diagnosis    albuterol 108 (90 Base) MCG/ACT inhaler    PROAIR HFA    1 Inhaler    Inhale 1 puff into the lungs 3 times daily For 3-5 days.        azelastine-fluticasone 137-50 MCG/ACT nasal spray    DYMISTA    2 Bottle    Spray 1 spray into both nostrils 2 times daily    Perennial allergic rhinitis with seasonal variation, Seasonal allergic rhinitis, unspecified allergic rhinitis trigger       budesonide 0.5 MG/2ML neb solution    PULMICORT    3 Box    Squirt entire vial into previously made rosalba med saline bottle, mix, and irrigate each nostril until entire bottle empty.  Do this  twice daily.    Post-operative state       budesonide-formoterol 160-4.5 MCG/ACT Inhaler    SYMBICORT     Inhale 2 puffs into the lungs 2 times daily        cetirizine 10 MG tablet    zyrTEC    30 tablet    Take 1 tablet (10 mg) by mouth every evening    Allergic rhinitis due to pollen, unspecified chronicity, unspecified seasonality, Seasonal allergic rhinitis, unspecified chronicity, unspecified trigger, Perennial allergic rhinitis with seasonal variation       dexlansoprazole 60 MG Cpdr CR capsule    DEXILANT     Take 60 mg by mouth daily        EPINEPHrine 0.3 MG/0.3ML injection    EPIPEN    2 each    Inject 0.3 mLs into the muscle once as needed for anaphylaxis for 1 dose.    Chronic rhinitis       fluticasone 50 MCG/ACT spray    FLONASE    16 g    Spray 2 sprays into both nostrils daily    Chronic rhinitis       loratadine 10 MG tablet    CLARITIN    30 tablet    TAKE 1 TABLET DAILY.    Allergic rhinitis due to pollen, Seasonal allergic rhinitis, Perennial allergic rhinitis with seasonal variation       montelukast 10 MG tablet    SINGULAIR    90 tablet    TAKE ONE TABLET AT BEDTIME.    Perennial allergic rhinitis with seasonal variation, Seasonal allergic rhinitis, Allergic rhinitis due to pollen       * multivitamin, therapeutic with minerals Tabs tablet      Take 1 tablet by mouth daily        * Multi-vitamin Tabs tablet           ORDER FOR ALLERGEN IMMUNOTHERAPY 5 mL vial     5 mL    Continue SCIT per standard build-up protocol.  Once patient reaches red vial maintenance, continue weekly 0.5 mL red vial maintenance injections x 1 year.  Follow standard maintenance protocols.    Perennial allergic rhinitis       pantoprazole sodium 40 MG packet    PROTONIX     Take 40 mg by mouth        sinus rinse bottle     1 each    Use high volume rosalba med saline irrigation. Use warm distilled water and 2 packets of the salt solution that comes with the bottle, dissolve in bottle up to 240 ml lana. Irrigate each side of  your nose leaning over the sink, using 1/3 to 1/2 the volume of the bottle in each nostril every irrigation.  Irrigate 5 times daily and as needed.  Add budesonide as indicated    Post-operative state       Vitamin D (Cholecalciferol) 1000 units Tabs      Take 2,000 Units by mouth        vitamin D 57258 UNIT capsule    ERGOCALCIFEROL     Take 2,000 Units by mouth daily        * Notice:  This list has 2 medication(s) that are the same as other medications prescribed for you. Read the directions carefully, and ask your doctor or other care provider to review them with you.

## 2018-10-18 ENCOUNTER — TELEPHONE (OUTPATIENT)
Dept: OBGYN | Age: 38
End: 2018-10-18

## 2018-10-18 LAB — HPV16+18+45 E6+E7MRNA CVX NAA+PROBE: NORMAL

## 2018-10-23 ENCOUNTER — OFFICE VISIT (OUTPATIENT)
Dept: OBGYN | Age: 38
End: 2018-10-23

## 2018-10-23 VITALS
HEART RATE: 86 BPM | WEIGHT: 147 LBS | DIASTOLIC BLOOD PRESSURE: 64 MMHG | HEIGHT: 63 IN | BODY MASS INDEX: 26.05 KG/M2 | SYSTOLIC BLOOD PRESSURE: 106 MMHG

## 2018-10-23 DIAGNOSIS — R87.610 ATYPICAL SQUAMOUS CELLS OF UNDETERMINED SIGNIFICANCE ON CYTOLOGIC SMEAR OF CERVIX (ASC-US): Primary | ICD-10-CM

## 2018-10-23 DIAGNOSIS — R87.810 CERVICAL HIGH RISK HPV (HUMAN PAPILLOMAVIRUS) TEST POSITIVE: ICD-10-CM

## 2018-10-23 PROCEDURE — 88305 TISSUE EXAM BY PATHOLOGIST: CPT | Performed by: PATHOLOGY

## 2018-10-23 PROCEDURE — 57456 ENDOCERV CURETTAGE W/SCOPE: CPT | Performed by: OBSTETRICS & GYNECOLOGY

## 2018-10-24 ENCOUNTER — ALLIED HEALTH/NURSE VISIT (OUTPATIENT)
Dept: ALLERGY | Facility: OTHER | Age: 38
End: 2018-10-24
Attending: PHYSICIAN ASSISTANT
Payer: COMMERCIAL

## 2018-10-24 DIAGNOSIS — J30.9 ALLERGIC RHINITIS: Primary | ICD-10-CM

## 2018-10-24 PROCEDURE — 95115 IMMUNOTHERAPY ONE INJECTION: CPT

## 2018-10-24 NOTE — MR AVS SNAPSHOT
After Visit Summary   10/24/2018    Sydni Isabel    MRN: [id number]           Patient Information     Date Of Birth          1980        Visit Information        Provider Department      10/24/2018 11:00 AM HC SHOT ROOM [facility]        Today's Diagnoses     Allergic rhinitis    -  1       Follow-ups after your visit        Who to contact     If you have questions or need follow up information about today's clinic visit or your schedule please contact [facility] directly at [phone].  Normal or non-critical lab and imaging results will be communicated to you by MyChart, letter or phone within 4 business days after the clinic has received the results. If you do not hear from us within 7 days, please contact the clinic through Neuropure[employer] or phone. If you have a critical or abnormal lab result, we will notify you by phone as soon as possible.  Submit refill requests through [facility] or call your pharmacy and they will forward the refill request to us. Please allow 3 business days for your refill to be completed.          Additional Information About Your Visit        MyChart Information     [facility] gives you secure access to your electronic health record. If you see a primary care provider, you can also send messages to your care team and make appointments. If you have questions, please call your primary care clinic.  If you do not have a primary care provider, please call [phone] and they will assist you.        Care EveryWhere ID     This is your Care EveryWhere ID. This could be used by other organizations to access your [place] medical records  [id number]         Blood Pressure from Last 3 Encounters:   01/11/18 112/70   12/27/17 114/73   12/20/17 115/77    Weight from Last 3 Encounters:   01/11/18 174 lb (78.9 kg)   12/27/17 174 lb (78.9 kg)   12/20/17 174 lb (78.9 kg)              We Performed the Following     Allergy Shot: One  injection        Primary Care Provider Office Phone # Fax #    Go Villarreal [phone]       [facility] [address]  [facility] [address]        Equal Access to Services     HERMINIO JI : Hadii aad ku hadhernano Sojoshuaali, waaxda luqadaha, qaybta kaalmada aderhondayada, susanna sanfordjavid myersjuan rerica monahan. So [facility] [phone].    ATENCIÓN: Si habla español, tiene a suero disposición servicios gratuitos de asistencia lingüística. Llame al [phone].    We comply with applicable federal civil rights laws and [place] laws. We do not discriminate on the basis of race, color, national origin, age, disability, sex, sexual orientation, or gender identity.            Thank you!     Thank you for choosing [facility]  for your care. Our goal is always to provide you with excellent care. Hearing back from our patients is one way we can continue to improve our services. Please take a few minutes to complete the written survey that you may receive in the mail after your visit with us. Thank you!             Your Updated Medication List - Protect others around you: Learn how to safely use, store and throw away your medicines at www.disposemymeds.org.          This list is accurate as of 10/24/18 11:19 AM.  Always use your most recent med list.                   Brand Name Dispense Instructions for use Diagnosis    albuterol 108 (90 Base) MCG/ACT inhaler    PROAIR HFA    1 Inhaler    Inhale 1 puff into the lungs 3 times daily For 3-5 days.        azelastine-fluticasone 137-50 MCG/ACT nasal spray    DYMISTA    2 Bottle    Spray 1 spray into both nostrils 2 times daily    Perennial allergic rhinitis with seasonal variation, Seasonal allergic rhinitis, unspecified allergic rhinitis trigger       budesonide 0.5 MG/2ML neb solution    PULMICORT    3 Box    Squirt entire vial into previously made rosalba med saline bottle, mix, and irrigate each nostril until entire bottle empty.  Do  this twice daily.    Post-operative state       budesonide-formoterol 160-4.5 MCG/ACT Inhaler    SYMBICORT     Inhale 2 puffs into the lungs 2 times daily        cetirizine 10 MG tablet    zyrTEC    30 tablet    Take 1 tablet (10 mg) by mouth every evening    Allergic rhinitis due to pollen, unspecified chronicity, unspecified seasonality, Seasonal allergic rhinitis, unspecified chronicity, unspecified trigger, Perennial allergic rhinitis with seasonal variation       dexlansoprazole 60 MG Cpdr CR capsule    DEXILANT     Take 60 mg by mouth daily        EPINEPHrine 0.3 MG/0.3ML injection    EPIPEN    2 each    Inject 0.3 mLs into the muscle once as needed for anaphylaxis for 1 dose.    Chronic rhinitis       fluticasone 50 MCG/ACT spray    FLONASE    16 g    Spray 2 sprays into both nostrils daily    Chronic rhinitis       loratadine 10 MG tablet    CLARITIN    30 tablet    TAKE 1 TABLET DAILY.    Allergic rhinitis due to pollen, Seasonal allergic rhinitis, Perennial allergic rhinitis with seasonal variation       montelukast 10 MG tablet    SINGULAIR    90 tablet    TAKE ONE TABLET AT BEDTIME.    Perennial allergic rhinitis with seasonal variation, Seasonal allergic rhinitis, Allergic rhinitis due to pollen       * multivitamin, therapeutic with minerals Tabs tablet      Take 1 tablet by mouth daily        * Multi-vitamin Tabs tablet           ORDER FOR ALLERGEN IMMUNOTHERAPY 5 mL vial     5 mL    Continue SCIT per standard build-up protocol.  Once patient reaches red vial maintenance, continue weekly 0.5 mL red vial maintenance injections x 1 year.  Follow standard maintenance protocols.    Perennial allergic rhinitis       pantoprazole sodium 40 MG packet    PROTONIX     Take 40 mg by mouth        sinus rinse bottle     1 each    Use high volume rosalba med saline irrigation. Use warm distilled water and 2 packets of the salt solution that comes with the bottle, dissolve in bottle up to 240 ml lana. Irrigate each  side of your nose leaning over the sink, using 1/3 to 1/2 the volume of the bottle in each nostril every irrigation.  Irrigate 5 times daily and as needed.  Add budesonide as indicated    Post-operative state       Vitamin D (Cholecalciferol) 1000 units Tabs      Take 2,000 Units by mouth        vitamin D 93390 UNIT capsule    ERGOCALCIFEROL     Take 2,000 Units by mouth daily        * Notice:  This list has 2 medication(s) that are the same as other medications prescribed for you. Read the directions carefully, and ask your doctor or other care provider to review them with you.

## 2018-10-25 LAB — PATHOLOGIST NAME: NORMAL

## 2018-10-26 ENCOUNTER — TELEPHONE (OUTPATIENT)
Dept: OBGYN | Age: 38
End: 2018-10-26

## 2018-11-02 ENCOUNTER — ALLIED HEALTH/NURSE VISIT (OUTPATIENT)
Dept: ALLERGY | Facility: OTHER | Age: 38
End: 2018-11-02
Attending: FAMILY MEDICINE
Payer: COMMERCIAL

## 2018-11-02 DIAGNOSIS — J30.9 ALLERGIC RHINITIS: Primary | ICD-10-CM

## 2018-11-02 PROCEDURE — 95115 IMMUNOTHERAPY ONE INJECTION: CPT

## 2018-11-02 NOTE — MR AVS SNAPSHOT
After Visit Summary   11/2/2018    Sydni Isabel    MRN: [id number]           Patient Information     Date Of Birth          1980        Visit Information        Provider Department      11/2/2018 2:30 PM HC SHOT ROOM [facility]        Today's Diagnoses     Allergic rhinitis    -  1       Follow-ups after your visit        Who to contact     If you have questions or need follow up information about today's clinic visit or your schedule please contact [facility] directly at [phone].  Normal or non-critical lab and imaging results will be communicated to you by MyChart, letter or phone within 4 business days after the clinic has received the results. If you do not hear from us within 7 days, please contact the clinic through [employer] or phone. If you have a critical or abnormal lab result, we will notify you by phone as soon as possible.  Submit refill requests through [employer] or call your pharmacy and they will forward the refill request to us. Please allow 3 business days for your refill to be completed.          Additional Information About Your Visit        MyChart Information     [employer] gives you secure access to your electronic health record. If you see a primary care provider, you can also send messages to your care team and make appointments. If you have questions, please call your primary care clinic.  If you do not have a primary care provider, please call [phone] and they will assist you.        Care EveryWhere ID     This is your Care EveryWhere ID. This could be used by other organizations to access your [place] medical records  [id number]         Blood Pressure from Last 3 Encounters:   01/11/18 112/70   12/27/17 114/73   12/20/17 115/77    Weight from Last 3 Encounters:   01/11/18 174 lb (78.9 kg)   12/27/17 174 lb (78.9 kg)   12/20/17 174 lb (78.9 kg)              We Performed the Following     Allergy Shot: One injection         Primary Care Provider Office Phone # Fax #    Go Villarreal [phone]       [facility] [address]  [facility] [address]        Equal Access to Services     HERMINIO JI : Hadii aad ku hadhernano Soomaali, waaxda luqadaha, qaybta kaalmada adebrad, susanna sanfordjavid pfeifferrhonda hardwickerica monahan. So [facility] [phone].    ATENCIÓN: Si habla español, tiene a suero disposición servicios gratuitos de asistencia lingüística. Llame al [phone].    We comply with applicable federal civil rights laws and [place] laws. We do not discriminate on the basis of race, color, national origin, age, disability, sex, sexual orientation, or gender identity.            Thank you!     Thank you for choosing [facility]  for your care. Our goal is always to provide you with excellent care. Hearing back from our patients is one way we can continue to improve our services. Please take a few minutes to complete the written survey that you may receive in the mail after your visit with us. Thank you!             Your Updated Medication List - Protect others around you: Learn how to safely use, store and throw away your medicines at www.disposemymeds.org.          This list is accurate as of 11/2/18  2:49 PM.  Always use your most recent med list.                   Brand Name Dispense Instructions for use Diagnosis    albuterol 108 (90 Base) MCG/ACT inhaler    PROAIR HFA    1 Inhaler    Inhale 1 puff into the lungs 3 times daily For 3-5 days.        azelastine-fluticasone 137-50 MCG/ACT nasal spray    DYMISTA    2 Bottle    Spray 1 spray into both nostrils 2 times daily    Perennial allergic rhinitis with seasonal variation, Seasonal allergic rhinitis, unspecified allergic rhinitis trigger       budesonide 0.5 MG/2ML neb solution    PULMICORT    3 Box    Squirt entire vial into previously made rosalba med saline bottle, mix, and irrigate each nostril until entire bottle empty.  Do this twice  daily.    Post-operative state       budesonide-formoterol 160-4.5 MCG/ACT Inhaler    SYMBICORT     Inhale 2 puffs into the lungs 2 times daily        cetirizine 10 MG tablet    zyrTEC    30 tablet    Take 1 tablet (10 mg) by mouth every evening    Allergic rhinitis due to pollen, unspecified chronicity, unspecified seasonality, Seasonal allergic rhinitis, unspecified chronicity, unspecified trigger, Perennial allergic rhinitis with seasonal variation       dexlansoprazole 60 MG Cpdr CR capsule    DEXILANT     Take 60 mg by mouth daily        EPINEPHrine 0.3 MG/0.3ML injection    EPIPEN    2 each    Inject 0.3 mLs into the muscle once as needed for anaphylaxis for 1 dose.    Chronic rhinitis       fluticasone 50 MCG/ACT spray    FLONASE    16 g    Spray 2 sprays into both nostrils daily    Chronic rhinitis       loratadine 10 MG tablet    CLARITIN    30 tablet    TAKE 1 TABLET DAILY.    Allergic rhinitis due to pollen, Seasonal allergic rhinitis, Perennial allergic rhinitis with seasonal variation       montelukast 10 MG tablet    SINGULAIR    90 tablet    TAKE ONE TABLET AT BEDTIME.    Perennial allergic rhinitis with seasonal variation, Seasonal allergic rhinitis, Allergic rhinitis due to pollen       * multivitamin, therapeutic with minerals Tabs tablet      Take 1 tablet by mouth daily        * Multi-vitamin Tabs tablet           ORDER FOR ALLERGEN IMMUNOTHERAPY 5 mL vial     5 mL    Continue SCIT per standard build-up protocol.  Once patient reaches red vial maintenance, continue weekly 0.5 mL red vial maintenance injections x 1 year.  Follow standard maintenance protocols.    Perennial allergic rhinitis       pantoprazole sodium 40 MG packet    PROTONIX     Take 40 mg by mouth        sinus rinse bottle     1 each    Use high volume rosalba med saline irrigation. Use warm distilled water and 2 packets of the salt solution that comes with the bottle, dissolve in bottle up to 240 ml lana. Irrigate each side of your  nose leaning over the sink, using 1/3 to 1/2 the volume of the bottle in each nostril every irrigation.  Irrigate 5 times daily and as needed.  Add budesonide as indicated    Post-operative state       Vitamin D (Cholecalciferol) 1000 units Tabs      Take 2,000 Units by mouth        vitamin D2 79376 UNIT capsule    ERGOCALCIFEROL     Take 2,000 Units by mouth daily        * Notice:  This list has 2 medication(s) that are the same as other medications prescribed for you. Read the directions carefully, and ask your doctor or other care provider to review them with you.

## 2018-11-02 NOTE — PROGRESS NOTES
Allergy injection given and charted on paper allergy flow sheet.  Patient experienced unknown reaction. Patient signed out HARDEEP.    Hien Christie

## 2018-11-12 ENCOUNTER — ALLIED HEALTH/NURSE VISIT (OUTPATIENT)
Dept: ALLERGY | Facility: OTHER | Age: 38
End: 2018-11-12
Attending: PHYSICIAN ASSISTANT
Payer: COMMERCIAL

## 2018-11-12 DIAGNOSIS — J30.9 ALLERGIC RHINITIS: Primary | ICD-10-CM

## 2018-11-12 PROCEDURE — 95115 IMMUNOTHERAPY ONE INJECTION: CPT

## 2018-11-12 NOTE — PROGRESS NOTES
Allergy injection/s given and charted on paper allergy flow sheet.  Patient left AMA and did not remain for observation. Consent form signed      Janel White LPN

## 2018-11-12 NOTE — MR AVS SNAPSHOT
After Visit Summary   11/12/2018    Sydni Isabel    MRN: [id number]           Patient Information     Date Of Birth          1980        Visit Information        Provider Department      11/12/2018 3:30 PM HC SHOT ROOM [facility] [place]        Today's Diagnoses     Allergic rhinitis    -  1       Follow-ups after your visit        Your next 10 appointments already scheduled     Nov 21, 2018  4:00 PM CST   injection with HC SHOT ROOM   [facility] [place] ([facility] [place] [facility]    [address]  [place] [address]   [phone]            Nov 28, 2018  4:15 PM CST   injection with HC SHOT ROOM   [facility] [place] ([facility] [place] )    [address]  [place] [address]   [phone]            Dec 24, 2018 10:30 AM CST   injection with HC SHOT ROOM   [facility] [place] ([facility] [place] )    [address]  [place] [address]   [phone]            Dec 31, 2018  3:30 PM CST   injection with HC SHOT ROOM   [facility] [place] ([facility] [place] )    [address]  [place] [address]   [phone]            Jan 07, 2019  3:30 PM CST   injection with HC SHOT ROOM   [facility] [place] ([facility] [place] )    [address]  [place] [address]   [phone]              Who to contact     If you have questions or need follow up information about today's clinic visit or your schedule please contact [facility] [place] directly at [phone].  Normal or non-critical lab and imaging results will be communicated to you by MyChart, letter or phone within 4 business days after the clinic has received the results. If you do not hear from us within 7 days, please contact the clinic through MyChart or phone. If you have a critical or abnormal lab result, we will notify you by phone as soon as possible.  Submit  refill requests through [employer] or call your pharmacy and they will forward the refill request to us. Please allow 3 business days for your refill to be completed.          Additional Information About Your Visit        [employer] Information     [employer] gives you secure access to your electronic health record. If you see a primary care provider, you can also send messages to your care team and make appointments. If you have questions, please call your primary care clinic.  If you do not have a primary care provider, please call [phone] and they will assist you.        Care EveryWhere ID     This is your Care EveryWhere ID. This could be used by other organizations to access your [facility] medical records  [id number]         Blood Pressure from Last 3 Encounters:   01/11/18 112/70   12/27/17 114/73   12/20/17 115/77    Weight from Last 3 Encounters:   01/11/18 174 lb (78.9 kg)   12/27/17 174 lb (78.9 kg)   12/20/17 174 lb (78.9 kg)              We Performed the Following     Allergy Shot: One injection        Primary Care Provider Office Phone # Fax #    Go Villarreal [phone]       [facility] [address]  [facility] [address]        Equal Access to Services     HERMINIO JI : Hadii alonzo ku hadasho Sojoshuaali, waaxda luqadaha, qaybta kaalmada adeegyada, susanna maier . So [facility] [phone].    ATENCIÓN: Si habla español, tiene a suero disposición servicios gratuitos de asistencia lingüística. Llame al [phone].    We comply with applicable federal civil rights laws and [place] laws. We do not discriminate on the basis of race, color, national origin, age, disability, sex, sexual orientation, or gender identity.            Thank you!     Thank you for choosing [facility]  for your care. Our goal is always to provide you with excellent care. Hearing back from our patients is one way we can continue to improve our services. Please  take a few minutes to complete the written survey that you may receive in the mail after your visit with us. Thank you!             Your Updated Medication List - Protect others around you: Learn how to safely use, store and throw away your medicines at www.disposemymeds.org.          This list is accurate as of 11/12/18  3:41 PM.  Always use your most recent med list.                   Brand Name Dispense Instructions for use Diagnosis    albuterol 108 (90 Base) MCG/ACT inhaler    PROAIR HFA    1 Inhaler    Inhale 1 puff into the lungs 3 times daily For 3-5 days.        azelastine-fluticasone 137-50 MCG/ACT nasal spray    DYMISTA    2 Bottle    Spray 1 spray into both nostrils 2 times daily    Perennial allergic rhinitis with seasonal variation, Seasonal allergic rhinitis, unspecified allergic rhinitis trigger       budesonide 0.5 MG/2ML neb solution    PULMICORT    3 Box    Squirt entire vial into previously made rosalba med saline bottle, mix, and irrigate each nostril until entire bottle empty.  Do this twice daily.    Post-operative state       budesonide-formoterol 160-4.5 MCG/ACT Inhaler    SYMBICORT     Inhale 2 puffs into the lungs 2 times daily        cetirizine 10 MG tablet    zyrTEC    30 tablet    Take 1 tablet (10 mg) by mouth every evening    Allergic rhinitis due to pollen, unspecified chronicity, unspecified seasonality, Seasonal allergic rhinitis, unspecified chronicity, unspecified trigger, Perennial allergic rhinitis with seasonal variation       dexlansoprazole 60 MG Cpdr CR capsule    DEXILANT     Take 60 mg by mouth daily        EPINEPHrine 0.3 MG/0.3ML injection    EPIPEN    2 each    Inject 0.3 mLs into the muscle once as needed for anaphylaxis for 1 dose.    Chronic rhinitis       fluticasone 50 MCG/ACT spray    FLONASE    16 g    Spray 2 sprays into both nostrils daily    Chronic rhinitis       loratadine 10 MG tablet    CLARITIN    30 tablet    TAKE 1 TABLET DAILY.    Allergic rhinitis due  to pollen, Seasonal allergic rhinitis, Perennial allergic rhinitis with seasonal variation       montelukast 10 MG tablet    SINGULAIR    90 tablet    TAKE ONE TABLET AT BEDTIME.    Perennial allergic rhinitis with seasonal variation, Seasonal allergic rhinitis, Allergic rhinitis due to pollen       * multivitamin, therapeutic with minerals Tabs tablet      Take 1 tablet by mouth daily        * Multi-vitamin Tabs tablet           ORDER FOR ALLERGEN IMMUNOTHERAPY 5 mL vial     5 mL    Continue SCIT per standard build-up protocol.  Once patient reaches red vial maintenance, continue weekly 0.5 mL red vial maintenance injections x 1 year.  Follow standard maintenance protocols.    Perennial allergic rhinitis       pantoprazole sodium 40 MG packet    PROTONIX     Take 40 mg by mouth        sinus rinse bottle     1 each    Use high volume rosalba med saline irrigation. Use warm distilled water and 2 packets of the salt solution that comes with the bottle, dissolve in bottle up to 240 ml lana. Irrigate each side of your nose leaning over the sink, using 1/3 to 1/2 the volume of the bottle in each nostril every irrigation.  Irrigate 5 times daily and as needed.  Add budesonide as indicated    Post-operative state       Vitamin D (Cholecalciferol) 1000 units Tabs      Take 2,000 Units by mouth        vitamin D2 83874 UNIT capsule    ERGOCALCIFEROL     Take 2,000 Units by mouth daily        * Notice:  This list has 2 medication(s) that are the same as other medications prescribed for you. Read the directions carefully, and ask your doctor or other care provider to review them with you.

## 2018-11-20 ENCOUNTER — MED INFO FORMS (OUTPATIENT)
Dept: FAMILY MEDICINE | Age: 38
End: 2018-11-20

## 2018-11-21 ENCOUNTER — ALLIED HEALTH/NURSE VISIT (OUTPATIENT)
Dept: ALLERGY | Facility: OTHER | Age: 38
End: 2018-11-21
Attending: PHYSICIAN ASSISTANT
Payer: COMMERCIAL

## 2018-11-21 DIAGNOSIS — J30.9 ALLERGIC RHINITIS: Primary | ICD-10-CM

## 2018-11-21 PROCEDURE — 95115 IMMUNOTHERAPY ONE INJECTION: CPT

## 2018-11-21 NOTE — PROGRESS NOTES
Allergy injection/s given and charted on paper allergy flow sheet.  Patient left AMA and did not remain for observation. Consent form signed  Tess Cool

## 2018-11-21 NOTE — MR AVS SNAPSHOT
After Visit Summary   11/21/2018    Sydni Isabel    MRN: [id number]           Patient Information     Date Of Birth          1980        Visit Information        Provider Department      11/21/2018 4:00 PM HC SHOT ROOM [facility] [place]        Today's Diagnoses     Allergic rhinitis    -  1       Follow-ups after your visit        Your next 10 appointments already scheduled     Nov 28, 2018  4:15 PM CST   injection with HC SHOT ROOM   [facility] [place] ([facility] [place] )    [address]  [place] [address]   [phone]            Dec 24, 2018 10:30 AM CST   injection with HC SHOT ROOM   [facility] [place] ([facility] [place] )    [address]  [place] [address]   [phone]            Dec 31, 2018  3:30 PM CST   injection with HC SHOT ROOM   [facility] [place] ([facility] [place] )    [address]  [place] [address]   [phone]            Jan 07, 2019  3:30 PM CST   injection with HC SHOT ROOM   [facility] [place] ([facility] [place] )    [address]  [place] [address]   [phone]              Who to contact     If you have questions or need follow up information about today's clinic visit or your schedule please contact [facility] [place] directly at [phone].  Normal or non-critical lab and imaging results will be communicated to you by MyChart, letter or phone within 4 business days after the clinic has received the results. If you do not hear from us within 7 days, please contact the clinic through MyChart or phone. If you have a critical or abnormal lab result, we will notify you by phone as soon as possible.  Submit refill requests through [employer] or call your pharmacy and they will forward the refill request to us. Please allow 3 business days for your refill to be completed.          Additional Information  About Your Visit        [employer] Information     [employer] gives you secure access to your electronic health record. If you see a primary care provider, you can also send messages to your care team and make appointments. If you have questions, please call your primary care clinic.  If you do not have a primary care provider, please call [phone] and they will assist you.        Care EveryWhere ID     This is your Care EveryWhere ID. This could be used by other organizations to access your [facility] medical records  [id number]         Blood Pressure from Last 3 Encounters:   01/11/18 112/70   12/27/17 114/73   12/20/17 115/77    Weight from Last 3 Encounters:   01/11/18 174 lb (78.9 kg)   12/27/17 174 lb (78.9 kg)   12/20/17 174 lb (78.9 kg)              We Performed the Following     Allergy Shot: One injection        Primary Care Provider Office Phone # Fax #    Go Villarreal [phone]       [facility] [address] [facility] [address]        Equal Access to Services     [facility]: Hadii aad ku hadasho Soomaali, waaxda luqadaha, qaybta kaalmada adeegyada, waxay aneudy haylinda maier . So [facility] [phone].    ATENCIÓN: Si habla español, tiene a suero disposición servicios gratuitos de asistencia lingüística. Llame al [phone].    We comply with applicable federal civil rights laws and [place] laws. We do not discriminate on the basis of race, color, national origin, age, disability, sex, sexual orientation, or gender identity.            Thank you!     Thank you for choosing [facility]  for your care. Our goal is always to provide you with excellent care. Hearing back from our patients is one way we can continue to improve our services. Please take a few minutes to complete the written survey that you may receive in the mail after your visit with us. Thank you!             Your Updated Medication List - Protect others around you: Learn how  to safely use, store and throw away your medicines at www.disposemymeds.org.          This list is accurate as of 11/21/18  4:17 PM.  Always use your most recent med list.                   Brand Name Dispense Instructions for use Diagnosis    albuterol 108 (90 Base) MCG/ACT inhaler    PROAIR HFA    1 Inhaler    Inhale 1 puff into the lungs 3 times daily For 3-5 days.        azelastine-fluticasone 137-50 MCG/ACT nasal spray    DYMISTA    2 Bottle    Spray 1 spray into both nostrils 2 times daily    Perennial allergic rhinitis with seasonal variation, Seasonal allergic rhinitis, unspecified allergic rhinitis trigger       budesonide 0.5 MG/2ML neb solution    PULMICORT    3 Box    Squirt entire vial into previously made rosalba med saline bottle, mix, and irrigate each nostril until entire bottle empty.  Do this twice daily.    Post-operative state       budesonide-formoterol 160-4.5 MCG/ACT Inhaler    SYMBICORT     Inhale 2 puffs into the lungs 2 times daily        cetirizine 10 MG tablet    zyrTEC    30 tablet    Take 1 tablet (10 mg) by mouth every evening    Allergic rhinitis due to pollen, unspecified chronicity, unspecified seasonality, Seasonal allergic rhinitis, unspecified chronicity, unspecified trigger, Perennial allergic rhinitis with seasonal variation       dexlansoprazole 60 MG Cpdr CR capsule    DEXILANT     Take 60 mg by mouth daily        EPINEPHrine 0.3 MG/0.3ML injection    EPIPEN    2 each    Inject 0.3 mLs into the muscle once as needed for anaphylaxis for 1 dose.    Chronic rhinitis       fluticasone 50 MCG/ACT spray    FLONASE    16 g    Spray 2 sprays into both nostrils daily    Chronic rhinitis       loratadine 10 MG tablet    CLARITIN    30 tablet    TAKE 1 TABLET DAILY.    Allergic rhinitis due to pollen, Seasonal allergic rhinitis, Perennial allergic rhinitis with seasonal variation       montelukast 10 MG tablet    SINGULAIR    90 tablet    TAKE ONE TABLET AT BEDTIME.    Perennial allergic  rhinitis with seasonal variation, Seasonal allergic rhinitis, Allergic rhinitis due to pollen       * multivitamin, therapeutic with minerals Tabs tablet      Take 1 tablet by mouth daily        * Multi-vitamin Tabs tablet           ORDER FOR ALLERGEN IMMUNOTHERAPY 5 mL vial     5 mL    Continue SCIT per standard build-up protocol.  Once patient reaches red vial maintenance, continue weekly 0.5 mL red vial maintenance injections x 1 year.  Follow standard maintenance protocols.    Perennial allergic rhinitis       pantoprazole sodium 40 MG packet    PROTONIX     Take 40 mg by mouth        sinus rinse bottle     1 each    Use high volume rosalba med saline irrigation. Use warm distilled water and 2 packets of the salt solution that comes with the bottle, dissolve in bottle up to 240 ml lana. Irrigate each side of your nose leaning over the sink, using 1/3 to 1/2 the volume of the bottle in each nostril every irrigation.  Irrigate 5 times daily and as needed.  Add budesonide as indicated    Post-operative state       Vitamin D (Cholecalciferol) 1000 units Tabs      Take 2,000 Units by mouth        vitamin D2 01345 UNIT capsule    ERGOCALCIFEROL     Take 2,000 Units by mouth daily        * Notice:  This list has 2 medication(s) that are the same as other medications prescribed for you. Read the directions carefully, and ask your doctor or other care provider to review them with you.

## 2018-11-26 ENCOUNTER — TELEPHONE (OUTPATIENT)
Dept: FAMILY MEDICINE | Age: 38
End: 2018-11-26

## 2018-11-28 ENCOUNTER — ALLIED HEALTH/NURSE VISIT (OUTPATIENT)
Dept: ALLERGY | Facility: OTHER | Age: 38
End: 2018-11-28
Attending: PHYSICIAN ASSISTANT
Payer: COMMERCIAL

## 2018-11-28 DIAGNOSIS — J30.9 ALLERGIC RHINITIS: Primary | ICD-10-CM

## 2018-11-28 PROCEDURE — 95115 IMMUNOTHERAPY ONE INJECTION: CPT

## 2018-11-28 NOTE — MR AVS SNAPSHOT
After Visit Summary   11/28/2018    Sydni Isabel    MRN: [id number]           Patient Information     Date Of Birth          1980        Visit Information        Provider Department      11/28/2018 4:15 PM HC SHOT ROOM [facility] [place]        Today's Diagnoses     Allergic rhinitis    -  1       Follow-ups after your visit        Your next 10 appointments already scheduled     Dec 24, 2018 10:30 AM CST   injection with HC SHOT ROOM   [facility] [place] ([facility] [place] )    [address]  [place] [address]   [phone]            Dec 31, 2018  3:30 PM CST   injection with HC SHOT ROOM   [facility] [place] ([facility] [place] )    [address]  [place] [address]   [phone]            Jan 07, 2019  3:30 PM CST   injection with HC SHOT ROOM   [facility] [place] ([facility] [place] )    [address]  [place] [address]   [phone]              Who to contact     If you have questions or need follow up information about today's clinic visit or your schedule please contact [facility] [place] directly at [phone].  Normal or non-critical lab and imaging results will be communicated to you by [employer], letter or phone within 4 business days after the clinic has received the results. If you do not hear from us within 7 days, please contact the clinic through [employer] or phone. If you have a critical or abnormal lab result, we will notify you by phone as soon as possible.  Submit refill requests through [employer] or call your pharmacy and they will forward the refill request to us. Please allow 3 business days for your refill to be completed.          Additional Information About Your Visit        [employer] Information     [employer] gives you secure access to your electronic health record. If you see a primary care provider, you can also send messages to your care team  and make appointments. If you have questions, please call your primary care clinic.  If you do not have a primary care provider, please call [phone] and they will assist you.        Care EveryWhere ID     This is your Care EveryWhere ID. This could be used by other organizations to access your [facility] medical records  [id number]         Blood Pressure from Last 3 Encounters:   01/11/18 112/70   12/27/17 114/73   12/20/17 115/77    Weight from Last 3 Encounters:   01/11/18 174 lb (78.9 kg)   12/27/17 174 lb (78.9 kg)   12/20/17 174 lb (78.9 kg)              We Performed the Following     Allergy Shot: One injection        Primary Care Provider Office Phone # Fax #    Go Villarreal [phone]       [facility] [address] [facility] [address]        Equal Access to Services     [facility]: Hadii aad ku hadasho Sojoshuaali, waaxda luqadaha, qaybta kaalmada adeegyada, waxay idiin hayaan martha maier . So [facility] [phone].    ATENCIÓN: Si habla español, tiene a suero disposición servicios gratuitos de asistencia lingüística. Llame al [phone].    We comply with applicable federal civil rights laws and [place] laws. We do not discriminate on the basis of race, color, national origin, age, disability, sex, sexual orientation, or gender identity.            Thank you!     Thank you for choosing [facility]  for your care. Our goal is always to provide you with excellent care. Hearing back from our patients is one way we can continue to improve our services. Please take a few minutes to complete the written survey that you may receive in the mail after your visit with us. Thank you!             Your Updated Medication List - Protect others around you: Learn how to safely use, store and throw away your medicines at www.disposemymeds.org.          This list is accurate as of 11/28/18  4:26 PM.  Always use your most recent med list.                    Brand Name Dispense Instructions for use Diagnosis    albuterol 108 (90 Base) MCG/ACT inhaler    PROAIR HFA    1 Inhaler    Inhale 1 puff into the lungs 3 times daily For 3-5 days.        azelastine-fluticasone 137-50 MCG/ACT nasal spray    DYMISTA    2 Bottle    Spray 1 spray into both nostrils 2 times daily    Perennial allergic rhinitis with seasonal variation, Seasonal allergic rhinitis, unspecified allergic rhinitis trigger       budesonide 0.5 MG/2ML neb solution    PULMICORT    3 Box    Squirt entire vial into previously made rosalba med saline bottle, mix, and irrigate each nostril until entire bottle empty.  Do this twice daily.    Post-operative state       budesonide-formoterol 160-4.5 MCG/ACT Inhaler    SYMBICORT     Inhale 2 puffs into the lungs 2 times daily        cetirizine 10 MG tablet    zyrTEC    30 tablet    Take 1 tablet (10 mg) by mouth every evening    Allergic rhinitis due to pollen, unspecified chronicity, unspecified seasonality, Seasonal allergic rhinitis, unspecified chronicity, unspecified trigger, Perennial allergic rhinitis with seasonal variation       dexlansoprazole 60 MG Cpdr CR capsule    DEXILANT     Take 60 mg by mouth daily        EPINEPHrine 0.3 MG/0.3ML injection    EPIPEN    2 each    Inject 0.3 mLs into the muscle once as needed for anaphylaxis for 1 dose.    Chronic rhinitis       fluticasone 50 MCG/ACT nasal spray    FLONASE    16 g    Spray 2 sprays into both nostrils daily    Chronic rhinitis       loratadine 10 MG tablet    CLARITIN    30 tablet    TAKE 1 TABLET DAILY.    Allergic rhinitis due to pollen, Seasonal allergic rhinitis, Perennial allergic rhinitis with seasonal variation       montelukast 10 MG tablet    SINGULAIR    90 tablet    TAKE ONE TABLET AT BEDTIME.    Perennial allergic rhinitis with seasonal variation, Seasonal allergic rhinitis, Allergic rhinitis due to pollen       * multivitamin w/minerals tablet      Take 1 tablet by mouth daily        *  Multi-vitamin tablet           ORDER FOR ALLERGEN IMMUNOTHERAPY 5 mL vial     5 mL    Continue SCIT per standard build-up protocol.  Once patient reaches red vial maintenance, continue weekly 0.5 mL red vial maintenance injections x 1 year.  Follow standard maintenance protocols.    Perennial allergic rhinitis       pantoprazole sodium 40 MG packet    PROTONIX     Take 40 mg by mouth        sinus rinse bottle     1 each    Use high volume rosalba med saline irrigation. Use warm distilled water and 2 packets of the salt solution that comes with the bottle, dissolve in bottle up to 240 ml lana. Irrigate each side of your nose leaning over the sink, using 1/3 to 1/2 the volume of the bottle in each nostril every irrigation.  Irrigate 5 times daily and as needed.  Add budesonide as indicated    Post-operative state       Vitamin D (Cholecalciferol) 1000 units Tabs      Take 2,000 Units by mouth        vitamin D2 87541 units (1250 mcg) capsule    ERGOCALCIFEROL     Take 2,000 Units by mouth daily        * Notice:  This list has 2 medication(s) that are the same as other medications prescribed for you. Read the directions carefully, and ask your doctor or other care provider to review them with you.

## 2018-12-03 ENCOUNTER — OFFICE VISIT (OUTPATIENT)
Dept: FAMILY MEDICINE | Age: 38
End: 2018-12-03

## 2018-12-03 VITALS
HEIGHT: 63 IN | DIASTOLIC BLOOD PRESSURE: 68 MMHG | SYSTOLIC BLOOD PRESSURE: 102 MMHG | WEIGHT: 141.31 LBS | HEART RATE: 80 BPM | BODY MASS INDEX: 25.04 KG/M2 | TEMPERATURE: 98.6 F

## 2018-12-03 DIAGNOSIS — G43.109 MIGRAINE WITH AURA AND WITHOUT STATUS MIGRAINOSUS, NOT INTRACTABLE: Primary | ICD-10-CM

## 2018-12-03 DIAGNOSIS — K58.0 IRRITABLE BOWEL SYNDROME WITH DIARRHEA: ICD-10-CM

## 2018-12-03 PROCEDURE — 99213 OFFICE O/P EST LOW 20 MIN: CPT | Performed by: FAMILY MEDICINE

## 2018-12-03 RX ORDER — METOCLOPRAMIDE 10 MG/1
10 TABLET ORAL 4 TIMES DAILY PRN
Qty: 30 TABLET | Refills: 3 | Status: SHIPPED | OUTPATIENT
Start: 2018-12-03 | End: 2021-04-21 | Stop reason: SDUPTHER

## 2018-12-03 RX ORDER — SUMATRIPTAN 25 MG/1
TABLET, FILM COATED ORAL
Qty: 9 TABLET | Refills: 5 | Status: SHIPPED | OUTPATIENT
Start: 2018-12-03 | End: 2021-04-21 | Stop reason: SDUPTHER

## 2018-12-05 ENCOUNTER — ALLIED HEALTH/NURSE VISIT (OUTPATIENT)
Dept: ALLERGY | Facility: OTHER | Age: 38
End: 2018-12-05
Attending: PHYSICIAN ASSISTANT
Payer: COMMERCIAL

## 2018-12-05 DIAGNOSIS — J30.9 ALLERGIC RHINITIS: Primary | ICD-10-CM

## 2018-12-05 PROCEDURE — 95165 ANTIGEN THERAPY SERVICES: CPT | Performed by: PHYSICIAN ASSISTANT

## 2018-12-05 NOTE — MR AVS SNAPSHOT
After Visit Summary   12/5/2018    Sydni Isabel    MRN: [id number]           Patient Information     Date Of Birth          1980        Visit Information        Provider Department      12/5/2018 11:15 AM HC SHOT ROOM [facility] [place]        Today's Diagnoses     Allergic rhinitis    -  1       Follow-ups after your visit        Your next 10 appointments already scheduled     Dec 24, 2018 10:30 AM CST   injection with HC SHOT ROOM   [facility] [place] ([facility] [place] )    [address]  [place] [address]   [phone]            Dec 31, 2018  3:30 PM CST   injection with HC SHOT ROOM   [facility] [place] ([facility] [place] )    [address]  [place] [address]   [phone]            Jan 07, 2019  3:30 PM CST   injection with HC SHOT ROOM   [facility] [place] ([facility] [place] )    [address]  [place] [address]   [phone]              Who to contact     If you have questions or need follow up information about today's clinic visit or your schedule please contact [facility] directly at [phone].  Normal or non-critical lab and imaging results will be communicated to you by Pikanotehart, letter or phone within 4 business days after the clinic has received the results. If you do not hear from us within 7 days, please contact the clinic through Pikanotehart or phone. If you have a critical or abnormal lab result, we will notify you by phone as soon as possible.  Submit refill requests through [employer] or call your pharmacy and they will forward the refill request to us. Please allow 3 business days for your refill to be completed.          Additional Information About Your Visit        PikanoteharSimpler Information     [employer] gives you secure access to your electronic health record. If you see a primary care provider, you can also send messages to your care team  and make appointments. If you have questions, please call your primary care clinic.  If you do not have a primary care provider, please call [phone] and they will assist you.        Care EveryWhere ID     This is your Care EveryWhere ID. This could be used by other organizations to access your [facility] medical records  [id number]         Blood Pressure from Last 3 Encounters:   01/11/18 112/70   12/27/17 114/73   12/20/17 115/77    Weight from Last 3 Encounters:   01/11/18 174 lb (78.9 kg)   12/27/17 174 lb (78.9 kg)   12/20/17 174 lb (78.9 kg)              Today, you had the following     No orders found for display       Primary Care Provider Office Phone # Fax #    Go Villarreal [phone]       [facility] [address] [facility] [address]        Equal Access to Services     [facility] : Hadii aad ku hadasho Soomaali, waaxda luqadaha, qaybta kaalmada adeegyada, waxay sanderin hayarlynn martha maier . So [facility] [phone].    ATENCIÓN: Si habla español, tiene a suero disposición servicios gratuitos de asistencia lingüística. Adespeedy al [phone].    We comply with applicable federal civil rights laws and [place] laws. We do not discriminate on the basis of race, color, national origin, age, disability, sex, sexual orientation, or gender identity.            Thank you!     Thank you for choosing [facility]  for your care. Our goal is always to provide you with excellent care. Hearing back from our patients is one way we can continue to improve our services. Please take a few minutes to complete the written survey that you may receive in the mail after your visit with us. Thank you!             Your Updated Medication List - Protect others around you: Learn how to safely use, store and throw away your medicines at www.disposemymeds.org.          This list is accurate as of 12/5/18 11:56 AM.  Always use your most recent med list.                    Brand Name Dispense Instructions for use Diagnosis    albuterol 108 (90 Base) MCG/ACT inhaler    PROAIR HFA    1 Inhaler    Inhale 1 puff into the lungs 3 times daily For 3-5 days.        azelastine-fluticasone 137-50 MCG/ACT nasal spray    DYMISTA    2 Bottle    Spray 1 spray into both nostrils 2 times daily    Perennial allergic rhinitis with seasonal variation, Seasonal allergic rhinitis, unspecified allergic rhinitis trigger       budesonide 0.5 MG/2ML neb solution    PULMICORT    3 Box    Squirt entire vial into previously made rosalba med saline bottle, mix, and irrigate each nostril until entire bottle empty.  Do this twice daily.    Post-operative state       budesonide-formoterol 160-4.5 MCG/ACT Inhaler    SYMBICORT     Inhale 2 puffs into the lungs 2 times daily        cetirizine 10 MG tablet    zyrTEC    30 tablet    Take 1 tablet (10 mg) by mouth every evening    Allergic rhinitis due to pollen, unspecified chronicity, unspecified seasonality, Seasonal allergic rhinitis, unspecified chronicity, unspecified trigger, Perennial allergic rhinitis with seasonal variation       dexlansoprazole 60 MG Cpdr CR capsule    DEXILANT     Take 60 mg by mouth daily        EPINEPHrine 0.3 MG/0.3ML injection    EPIPEN    2 each    Inject 0.3 mLs into the muscle once as needed for anaphylaxis for 1 dose.    Chronic rhinitis       fluticasone 50 MCG/ACT nasal spray    FLONASE    16 g    Spray 2 sprays into both nostrils daily    Chronic rhinitis       loratadine 10 MG tablet    CLARITIN    30 tablet    TAKE 1 TABLET DAILY.    Allergic rhinitis due to pollen, Seasonal allergic rhinitis, Perennial allergic rhinitis with seasonal variation       montelukast 10 MG tablet    SINGULAIR    90 tablet    TAKE ONE TABLET AT BEDTIME.    Perennial allergic rhinitis with seasonal variation, Seasonal allergic rhinitis, Allergic rhinitis due to pollen       * multivitamin w/minerals tablet      Take 1 tablet by mouth daily        *  Multi-vitamin tablet           ORDER FOR ALLERGEN IMMUNOTHERAPY 5 mL vial     5 mL    Continue SCIT per standard build-up protocol.  Once patient reaches red vial maintenance, continue weekly 0.5 mL red vial maintenance injections x 1 year.  Follow standard maintenance protocols.    Perennial allergic rhinitis       pantoprazole sodium 40 MG packet    PROTONIX     Take 40 mg by mouth        sinus rinse bottle     1 each    Use high volume rosalba med saline irrigation. Use warm distilled water and 2 packets of the salt solution that comes with the bottle, dissolve in bottle up to 240 ml lana. Irrigate each side of your nose leaning over the sink, using 1/3 to 1/2 the volume of the bottle in each nostril every irrigation.  Irrigate 5 times daily and as needed.  Add budesonide as indicated    Post-operative state       Vitamin D (Cholecalciferol) 1000 units Tabs      Take 2,000 Units by mouth        vitamin D2 71165 units (1250 mcg) capsule    ERGOCALCIFEROL     Take 2,000 Units by mouth daily        * Notice:  This list has 2 medication(s) that are the same as other medications prescribed for you. Read the directions carefully, and ask your doctor or other care provider to review them with you.

## 2018-12-05 NOTE — PROGRESS NOTES
Allergy serum is mixed today at [facility] red maintenance,  into  1  (5 ml)  multi dose vial/vials.    Allergens included were:    Ragweed  0.2 ml of dilution # 0  Pigweed  0.2 ml of dilution # 0  Mugwort 0.2 ml of dilution # 0  Kochia  0.2 ml of dilution # 0  Russian Thistle 0.2 ml of dilution # 1  Viraj Grass 0.2 ml of dilution # 1  Birch mix 0.2 ml of dilution # 1  Maple Mix 0.2 of dilution # 1  Elm Mix 0.2 ml of dilution # 1  Oak Mix 0.2 ml of dilution # 1  Joey Mix 0.2 ml of dilution # 1  Pine Mix 0.2 ml of dilution # 1  Eastern Chula 0.2 ml of dilution # 0  Black Iroquois 0.2 ml of dilution # 0  Aspen 0.2 ml of dilution # 0  Red [place] 0.2 ml of dilution # 0    Alternaria 0.2 ml of dilution # 1  Aspergillus 0.2 ml of dilution # 0  Hormodendrum 0.2 ml of dilution # 0  Helminthosporium 0.2 ml of dilution # 0  Penicillium 0.2 ml of dilution # 1  Epicoccum 0.2 ml of dilution # 0  Fusarium 0.2 ml of dilution # 0  Mucor 0.2 ml of dilution # 0  Grain Smut 0.2 ml of dilution # 0  Grass Smut 0.2 ml of dilution # 0  Cat 0.2 ml of dilution # 1  Dog 0.2 ml of dilution # 1  Feather Mix 0.2 ml of dilution # 0  Dust Mite Mix 0.2 ml of dilution # 1  Horse 0.2 ml of dilution # 0    Ana Luisa Khan RN

## 2018-12-13 ENCOUNTER — WALK IN (OUTPATIENT)
Dept: URGENT CARE | Age: 38
End: 2018-12-13

## 2018-12-13 VITALS
TEMPERATURE: 98.2 F | WEIGHT: 138.01 LBS | HEART RATE: 84 BPM | BODY MASS INDEX: 24.45 KG/M2 | RESPIRATION RATE: 16 BRPM | SYSTOLIC BLOOD PRESSURE: 105 MMHG | HEIGHT: 63 IN | OXYGEN SATURATION: 98 % | DIASTOLIC BLOOD PRESSURE: 72 MMHG

## 2018-12-13 DIAGNOSIS — R35.0 FREQUENCY OF URINATION: ICD-10-CM

## 2018-12-13 DIAGNOSIS — N30.00 ACUTE CYSTITIS WITHOUT HEMATURIA: ICD-10-CM

## 2018-12-13 DIAGNOSIS — R30.0 DYSURIA: Primary | ICD-10-CM

## 2018-12-13 LAB
APPEARANCE UR: CLEAR
BACTERIA #/AREA URNS HPF: ABNORMAL /HPF
BILIRUB UR QL STRIP: NEGATIVE
COLOR UR: YELLOW
GLUCOSE UR STRIP-MCNC: NEGATIVE MG/DL
HGB UR QL STRIP: NEGATIVE
HYALINE CASTS #/AREA URNS LPF: ABNORMAL /LPF (ref 0–5)
KETONES UR STRIP-MCNC: 15 MG/DL
LEUKOCYTE ESTERASE UR QL STRIP: NEGATIVE
NITRITE UR QL STRIP: NEGATIVE
PH UR STRIP: >9 UNITS (ref 5–7)
PROT UR STRIP-MCNC: 30 MG/DL
RBC #/AREA URNS HPF: ABNORMAL /HPF (ref 0–3)
SP GR UR STRIP: 1.01 (ref 1–1.03)
SPECIMEN SOURCE: ABNORMAL
SQUAMOUS #/AREA URNS HPF: ABNORMAL /HPF (ref 0–5)
UROBILINOGEN UR STRIP-MCNC: 0.2 MG/DL (ref 0–1)
WBC #/AREA URNS HPF: ABNORMAL /HPF (ref 0–5)

## 2018-12-13 PROCEDURE — 99213 OFFICE O/P EST LOW 20 MIN: CPT | Performed by: EMERGENCY MEDICINE

## 2018-12-13 PROCEDURE — 81001 URINALYSIS AUTO W/SCOPE: CPT | Performed by: INTERNAL MEDICINE

## 2018-12-13 PROCEDURE — 87077 CULTURE AEROBIC IDENTIFY: CPT | Performed by: INTERNAL MEDICINE

## 2018-12-13 PROCEDURE — 87086 URINE CULTURE/COLONY COUNT: CPT | Performed by: INTERNAL MEDICINE

## 2018-12-13 PROCEDURE — 87186 SC STD MICRODIL/AGAR DIL: CPT | Performed by: INTERNAL MEDICINE

## 2018-12-13 RX ORDER — NITROFURANTOIN 25; 75 MG/1; MG/1
100 CAPSULE ORAL 2 TIMES DAILY
Qty: 14 CAPSULE | Refills: 0 | Status: SHIPPED | OUTPATIENT
Start: 2018-12-13 | End: 2018-12-20

## 2018-12-15 LAB
BACTERIA UR CULT: ABNORMAL
ORGANISM: ABNORMAL
REPORT STATUS (RPT): ABNORMAL
SPECIMEN SOURCE: ABNORMAL

## 2018-12-16 ENCOUNTER — TELEPHONE (OUTPATIENT)
Dept: URGENT CARE | Age: 38
End: 2018-12-16

## 2018-12-17 RX ORDER — CIPROFLOXACIN 250 MG/1
250 TABLET, FILM COATED ORAL 2 TIMES DAILY
Qty: 10 TABLET | Refills: 0 | Status: SHIPPED | OUTPATIENT
Start: 2018-12-17 | End: 2018-12-22

## 2018-12-24 ENCOUNTER — ALLIED HEALTH/NURSE VISIT (OUTPATIENT)
Dept: ALLERGY | Facility: OTHER | Age: 38
End: 2018-12-24
Attending: PHYSICIAN ASSISTANT
Payer: COMMERCIAL

## 2018-12-24 DIAGNOSIS — J30.2 PERENNIAL ALLERGIC RHINITIS WITH SEASONAL VARIATION: Primary | ICD-10-CM

## 2018-12-24 DIAGNOSIS — J30.89 PERENNIAL ALLERGIC RHINITIS WITH SEASONAL VARIATION: Primary | ICD-10-CM

## 2018-12-24 NOTE — PROGRESS NOTES
Prior to injection verified patient identity using patient's name and date of birth.    Safety Vial Test was done in  Right arm for new Red vial # 1.  Administered 0.01 ml (ID) for SVT.   SVT = 14.    Fail     Patient was instructed to double up on her antihistamines and return in one week for another safety vial test.    Thuy Herrera

## 2018-12-31 ENCOUNTER — ALLIED HEALTH/NURSE VISIT (OUTPATIENT)
Dept: ALLERGY | Facility: OTHER | Age: 38
End: 2018-12-31
Attending: PHYSICIAN ASSISTANT
Payer: COMMERCIAL

## 2018-12-31 DIAGNOSIS — J30.9 ALLERGIC RHINITIS: Primary | ICD-10-CM

## 2018-12-31 PROCEDURE — 95115 IMMUNOTHERAPY ONE INJECTION: CPT

## 2018-12-31 NOTE — PROGRESS NOTES
Prior to injection verified patient identity using patient's name and date of birth.    Safety Vial Test was done in  Left arm for new Red vial # 1.  Administered 0.01 ml (ID) for SVT.   SVT = 9.    Pass    Allergy injection given and charted on the paper flow sheet.      Per orders of Martina Dominguez, an allergy injections is given by Thuy Herrera.    The patient signed out AMA.    Thuy Herrera

## 2019-01-03 ENCOUNTER — TELEPHONE (OUTPATIENT)
Dept: FAMILY MEDICINE | Age: 39
End: 2019-01-03

## 2019-01-07 ENCOUNTER — ALLIED HEALTH/NURSE VISIT (OUTPATIENT)
Dept: ALLERGY | Facility: OTHER | Age: 39
End: 2019-01-07
Attending: PHYSICIAN ASSISTANT
Payer: COMMERCIAL

## 2019-01-07 DIAGNOSIS — J30.9 ALLERGIC RHINITIS: Primary | ICD-10-CM

## 2019-01-07 PROCEDURE — 95115 IMMUNOTHERAPY ONE INJECTION: CPT

## 2019-02-01 ENCOUNTER — ALLIED HEALTH/NURSE VISIT (OUTPATIENT)
Dept: ALLERGY | Facility: OTHER | Age: 39
End: 2019-02-01
Attending: PHYSICIAN ASSISTANT
Payer: COMMERCIAL

## 2019-02-01 DIAGNOSIS — J30.9 ALLERGIC RHINITIS: Primary | ICD-10-CM

## 2019-02-01 PROCEDURE — 95115 IMMUNOTHERAPY ONE INJECTION: CPT

## 2019-02-20 ENCOUNTER — OFFICE VISIT (OUTPATIENT)
Dept: CHIROPRACTIC MEDICINE | Facility: OTHER | Age: 39
End: 2019-02-20
Attending: CHIROPRACTOR
Payer: COMMERCIAL

## 2019-02-20 DIAGNOSIS — M99.02 SEGMENTAL AND SOMATIC DYSFUNCTION OF THORACIC REGION: ICD-10-CM

## 2019-02-20 DIAGNOSIS — M54.2 CERVICALGIA: ICD-10-CM

## 2019-02-20 DIAGNOSIS — M99.01 SEGMENTAL AND SOMATIC DYSFUNCTION OF CERVICAL REGION: Primary | ICD-10-CM

## 2019-02-20 PROCEDURE — 98940 CHIROPRACT MANJ 1-2 REGIONS: CPT | Mod: AT | Performed by: CHIROPRACTOR

## 2019-02-20 PROCEDURE — 99212 OFFICE O/P EST SF 10 MIN: CPT | Mod: 25 | Performed by: CHIROPRACTOR

## 2019-02-20 NOTE — PROGRESS NOTES
Subjective Finding:    Chief compalint: Patient presents with:  Neck Pain  , Pain Scale: 5/10, Intensity: sharp, Duration: 1 months, Radiating: no.    Date of injury:     Activities that the pain restricts:   Home/household/hobbies/social activities: no.  Work duties: no.  Sleep: no.  Makes symptoms better: rest.  Makes symptoms worse: activity, cervical extension and cervical flexion.  Have you seen anyone else for the symptoms? MD.  Work related: no.  Automobile related injury: no.    Objective and Assessment:    Posture Analysis:   High shoulder: .  Head tilt: .  High iliac crest: .  Head carriage: forward.  Thoracic Kyphosis: neutral.  Lumbar Lordosis: neutral.    Lumbar Range of Motion: .  Cervical Range of Motion: .  Thoracic Range of Motion: left lateral flexion decreased.  Extremity Range of Motion: .    Palpation:   Lev scapulae: stiff, referred pain: no  Traps: sharp pain, referred pain: no    Segmental dysfunction pre-treatment and treatment area: T567    C56 right    Assessment post-treatment:  Cervical: ROM increased.  Thoracic: ROM increased.  Lumbar: ROM increased.    Comments: .      Complicating Factors: .    Plan / Procedure:    Treatment plan: prn.  Instructed patient: stretch as instructed at visit.  Short term goals: increase ROM.  Long term goals: restore normal function.  Prognosis: very good.

## 2019-02-27 ENCOUNTER — ALLIED HEALTH/NURSE VISIT (OUTPATIENT)
Dept: ALLERGY | Facility: OTHER | Age: 39
End: 2019-02-27
Attending: PHYSICIAN ASSISTANT
Payer: COMMERCIAL

## 2019-02-27 DIAGNOSIS — J30.9 ALLERGIC RHINITIS: Primary | ICD-10-CM

## 2019-02-27 PROCEDURE — 95115 IMMUNOTHERAPY ONE INJECTION: CPT

## 2019-02-27 NOTE — PROGRESS NOTES
Allergy injection/s given and charted on paper allergy flow sheet.  Patient signed out AMA.    Thuy Herrera

## 2019-03-08 ENCOUNTER — ANCILLARY PROCEDURE (OUTPATIENT)
Dept: MAMMOGRAPHY | Facility: OTHER | Age: 39
End: 2019-03-08
Attending: FAMILY MEDICINE
Payer: COMMERCIAL

## 2019-03-08 DIAGNOSIS — Z12.31 VISIT FOR SCREENING MAMMOGRAM: ICD-10-CM

## 2019-03-08 PROCEDURE — 77063 BREAST TOMOSYNTHESIS BI: CPT | Mod: TC

## 2019-03-08 PROCEDURE — 77067 SCR MAMMO BI INCL CAD: CPT | Mod: TC

## 2019-03-15 ENCOUNTER — ALLIED HEALTH/NURSE VISIT (OUTPATIENT)
Dept: ALLERGY | Facility: OTHER | Age: 39
End: 2019-03-15
Attending: PHYSICIAN ASSISTANT
Payer: COMMERCIAL

## 2019-03-15 DIAGNOSIS — J30.9 ALLERGIC RHINITIS: Primary | ICD-10-CM

## 2019-03-15 PROCEDURE — 95115 IMMUNOTHERAPY ONE INJECTION: CPT

## 2019-04-29 ENCOUNTER — MED INFO FORMS (OUTPATIENT)
Dept: FAMILY MEDICINE | Age: 39
End: 2019-04-29

## 2019-05-15 ENCOUNTER — TELEPHONE (OUTPATIENT)
Dept: OTOLARYNGOLOGY | Facility: OTHER | Age: 39
End: 2019-05-15

## 2019-05-15 DIAGNOSIS — J30.89 PERENNIAL ALLERGIC RHINITIS: ICD-10-CM

## 2019-05-15 NOTE — Clinical Note
New orders for cutting back SCIT dose due to it being 9 weeks since last injections.  Please, review prior to signing.Ana Liusa Khan RN on 5/15/2019 at 1:32 PM

## 2019-05-15 NOTE — TELEPHONE ENCOUNTER
Sydni and Alma Delia, her daughter are 9 weeks out since her last injection.  They are both overdue for allergy Follow-up visits also.  They will need to be seen by RANDELL Basilio prior to any additional orders.  They may receive their injections this Friday per RANDELL Basilio.    New orders will be entered for their immunotherapy.    I have tried to reach Sydni x4, as she was upset when the [facility] contacted her about their injections.    Her VM is full x 4 attempts and will not accept any messages.  She has yet to answer her phone.    Ana Luisa Khan

## 2019-05-20 ENCOUNTER — ALLIED HEALTH/NURSE VISIT (OUTPATIENT)
Dept: ALLERGY | Facility: OTHER | Age: 39
End: 2019-05-20
Attending: PHYSICIAN ASSISTANT
Payer: COMMERCIAL

## 2019-05-20 DIAGNOSIS — J30.9 ALLERGIC RHINITIS: Primary | ICD-10-CM

## 2019-05-20 PROCEDURE — 95115 IMMUNOTHERAPY ONE INJECTION: CPT

## 2019-05-20 NOTE — PROGRESS NOTES
Prior to injection, verified patient identity by using patient's name and date of birth.  Allergy injection given and charted on paper allergy flow sheet.     Patient signed out AMA.    Thuy Herrera

## 2019-06-14 ENCOUNTER — OFFICE VISIT (OUTPATIENT)
Dept: OTOLARYNGOLOGY | Facility: OTHER | Age: 39
End: 2019-06-14
Attending: PHYSICIAN ASSISTANT
Payer: COMMERCIAL

## 2019-06-14 ENCOUNTER — TELEPHONE (OUTPATIENT)
Dept: ALLERGY | Facility: OTHER | Age: 39
End: 2019-06-14

## 2019-06-14 VITALS
OXYGEN SATURATION: 98 % | HEART RATE: 70 BPM | DIASTOLIC BLOOD PRESSURE: 72 MMHG | TEMPERATURE: 97.2 F | SYSTOLIC BLOOD PRESSURE: 116 MMHG | HEIGHT: 66 IN | BODY MASS INDEX: 30.86 KG/M2 | WEIGHT: 192 LBS

## 2019-06-14 DIAGNOSIS — J30.89 PERENNIAL ALLERGIC RHINITIS WITH SEASONAL VARIATION: Primary | ICD-10-CM

## 2019-06-14 DIAGNOSIS — J30.89 PERENNIAL ALLERGIC RHINITIS WITH SEASONAL VARIATION: ICD-10-CM

## 2019-06-14 DIAGNOSIS — J30.2 SEASONAL ALLERGIC RHINITIS, UNSPECIFIED TRIGGER: ICD-10-CM

## 2019-06-14 DIAGNOSIS — J30.1 SEASONAL ALLERGIC RHINITIS DUE TO POLLEN: ICD-10-CM

## 2019-06-14 DIAGNOSIS — J30.2 PERENNIAL ALLERGIC RHINITIS WITH SEASONAL VARIATION: ICD-10-CM

## 2019-06-14 DIAGNOSIS — J30.2 PERENNIAL ALLERGIC RHINITIS WITH SEASONAL VARIATION: Primary | ICD-10-CM

## 2019-06-14 PROCEDURE — 99213 OFFICE O/P EST LOW 20 MIN: CPT | Performed by: PHYSICIAN ASSISTANT

## 2019-06-14 RX ORDER — MONTELUKAST SODIUM 10 MG/1
TABLET ORAL
Qty: 90 TABLET | Refills: 3 | Status: SHIPPED | OUTPATIENT
Start: 2019-06-14 | End: 2023-03-08

## 2019-06-14 RX ORDER — LEVOCETIRIZINE DIHYDROCHLORIDE 5 MG/1
5 TABLET, FILM COATED ORAL EVERY EVENING
Qty: 90 TABLET | Refills: 3 | Status: SHIPPED | OUTPATIENT
Start: 2019-06-14 | End: 2021-02-19

## 2019-06-14 RX ORDER — BUDESONIDE 0.5 MG/2ML
0.5 INHALANT ORAL 2 TIMES DAILY
Qty: 2 BOX | Refills: 1 | Status: SHIPPED | OUTPATIENT
Start: 2019-06-14 | End: 2021-02-19

## 2019-06-14 ASSESSMENT — PAIN SCALES - GENERAL: PAINLEVEL: NO PAIN (0)

## 2019-06-14 ASSESSMENT — MIFFLIN-ST. JEOR: SCORE: 1549.72

## 2019-06-14 NOTE — NURSING NOTE
"Chief Complaint   Patient presents with     Allergies     IT Follow Up; She would like to switch to SLIT drops       Initial /72 (Cuff Size: Adult Regular)   Pulse 70   Temp 97.2  F (36.2  C) (Tympanic)   Ht 1.664 m (5' 5.5\")   Wt 87.1 kg (192 lb)   SpO2 98%   BMI 31.46 kg/m   Estimated body mass index is 31.46 kg/m  as calculated from the following:    Height as of this encounter: 1.664 m (5' 5.5\").    Weight as of this encounter: 87.1 kg (192 lb).  Medication Reconciliation: complete        "

## 2019-06-14 NOTE — PATIENT INSTRUCTIONS
Refilled Singulair  Sent Xyzal in. Take one tablet daily.   Continue with Flonase.   Start budesonide rinses 1-2 times daily for congestion/ sinuses.     Change allergy shots to drops.       Thank you for allowing Martina Dominguez PA-C and our ENT team to participate in your care.  If your medications are too expensive, please give the nurse a call.  We can possibly change this medication.  If you have a scheduling or an appointment question please contact our Health Unit Coordinator at their direct line [phone].   ALL nursing questions or concerns can be directed to your ENT nurse at: [phone] Michelle

## 2019-06-14 NOTE — LETTER
6/14/2019         RE: Sydni Isabel  [address] [facility] [address]        Dear Colleague,    Thank you for referring your patient, Sydni Isabel, to the [facility]. Please see a copy of my visit note below.      Chief Complaint   Patient presents with     Allergies     IT Follow Up; She would like to switch to SLIT drops     Sydni presents for SCIT. She has been doing well. Reports she has not had an injection for a few weeks.   She is on Red vial in 12/2018.   8/5/16  MQT-  Dilution #6: Dust  Dilution #5: Grass, Cat, dog  Dilution #2: Trees, molds, molds  Hx of perennial allergic rhinitis with seasonal exacerbation   last note reviewed: using rosalba med, Flonase, claritin and singulari  On subcutaneous immunotherapy     12/20/17  PROCEDURES PERFORMED:   1.  Bilateral endoscopic frontal sinusotomy  2.  Bilateral total ethmoidectomy  3.  Bilateral maxillary antrostomy with tissue removal   4.  Septoplasty with cartilage reinsertion  5.  Repair left nasal valve using septal cartilage graft  6.  Bilateral submucosal inferior turbinate reduction    Sinus procedures above performed using Gobble  navigation.     SURGEON: Yulisa Quiroz D.O.  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     Allergies   Allergen Reactions     Ciprofloxacin Other (See Comments)     Leg pain     Seasonal Allergies Cough     Current Outpatient Medications   Medication     albuterol (ALBUTEROL) 108 (90 BASE) MCG/ACT inhaler     azelastine-fluticasone (DYMISTA) 137-50 MCG/ACT nasal spray     budesonide (PULMICORT) 0.5 MG/2ML neb solution     budesonide-formoterol (SYMBICORT) 160-4.5 MCG/ACT Inhaler     cetirizine (ZYRTEC) 10 MG tablet      "dexlansoprazole (DEXILANT) 60 MG CPDR CR capsule     EPINEPHrine (EPIPEN) 0.3 MG/0.3ML injection     fluticasone (FLONASE) 50 MCG/ACT nasal spray     Hypertonic Nasal Wash (SINUS RINSE) bottle     loratadine (CLARITIN) 10 MG tablet     montelukast (SINGULAIR) 10 MG tablet     multivitamin, therapeutic with minerals (MULTI-VITAMIN) TABS tablet     multivitamin, therapeutic with minerals (THERA-VIT-M) TABS     ORDER FOR ALLERGEN IMMUNOTHERAPY     pantoprazole sodium (PROTONIX) 40 MG packet     vitamin D (ERGOCALCIFEROL) 76918 UNIT capsule     Vitamin D, Cholecalciferol, 1000 UNITS TABS     No current facility-administered medications for this visit.       ROS: 10 point ROS neg other than the symptoms noted above in the HPI.  /72 (Cuff Size: Adult Regular)   Pulse 70   Temp 97.2  F (36.2  C) (Tympanic)   Ht 1.664 m (5' 5.5\")   Wt 87.1 kg (192 lb)   SpO2 98%   BMI 31.46 kg/m     General - The patient is well nourished and well developed, and appears to have good nutritional status.  Alert and oriented to person and place, interactive.  Head and Face - Normocephalic and atraumatic, with no gross asymmetry noted of the contour of the facial features.  The facial nerve is intact, with strong symmetric movements.  Neck-no palpable lymphadenopathy or thyroid mass.  Trachea is midline.  Eyes - Extraocular movements intact.   Ears- External auditory canals are patent, tympanic membranes are intact without effusion or worrisome retractions   Nose - Nasal mucosa is pink and moist with no abnormal mucus.     inferior turbinate hypertrophy  No polyps, masses, or purulence noted on examination.    Mouth - Examination of the oral cavity shows pink, healthy, moist mucosa.  No lesions or ulceration noted.  The dentition are in good repair.  The tongue is mobile and midline.  Throat - The walls of the oropharynx were smooth, pink, moist, symmetric, and had no lesions or ulcerations.  The tonsillar pillars and soft palate " were symmetric.  The uvula was midline on elevation.        ASSESSMENT:    ICD-10-CM    1. Perennial allergic rhinitis with seasonal variation J30.89 montelukast (SINGULAIR) 10 MG tablet    J30.2 levocetirizine (XYZAL) 5 MG tablet     budesonide (PULMICORT) 0.5 MG/2ML neb solution   2. Seasonal allergic rhinitis, unspecified trigger J30.2 montelukast (SINGULAIR) 10 MG tablet     levocetirizine (XYZAL) 5 MG tablet     budesonide (PULMICORT) 0.5 MG/2ML neb solution   3. Seasonal allergic rhinitis due to pollen J30.1 montelukast (SINGULAIR) 10 MG tablet     levocetirizine (XYZAL) 5 MG tablet     budesonide (PULMICORT) 0.5 MG/2ML neb solution     Refilled Singulair  Sent Xyzal in. Take one tablet daily.   Continue with Flonase.   Start budesonide rinses 1-2 times daily for congestion/ sinuses.     Change allergy shots to drops.     Martina Dominguez PA-C  ENT  [facility]  [phone]      Again, thank you for allowing me to participate in the care of your patient.        Sincerely,        Martina Dominguez PA-C

## 2019-06-14 NOTE — PROGRESS NOTES
Chief Complaint   Patient presents with     Allergies     IT Follow Up; She would like to switch to SLIT drops     Sydni presents for SCIT. She has been doing well. Reports she has not had an injection for a few weeks.   She is on Red vial in 12/2018.   8/5/16  MQT-  Dilution #6: Dust  Dilution #5: Grass, Cat, dog  Dilution #2: Trees, molds, molds  Hx of perennial allergic rhinitis with seasonal exacerbation   last note reviewed: using rosalba med, Flonase, claritin and singulari  On subcutaneous immunotherapy     12/20/17  PROCEDURES PERFORMED:   1.  Bilateral endoscopic frontal sinusotomy  2.  Bilateral total ethmoidectomy  3.  Bilateral maxillary antrostomy with tissue removal   4.  Septoplasty with cartilage reinsertion  5.  Repair left nasal valve using septal cartilage graft  6.  Bilateral submucosal inferior turbinate reduction    Sinus procedures above performed using [place]o  navigation.     SURGEON: Yulisa Quiroz D.O.  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     Allergies   Allergen Reactions     Ciprofloxacin Other (See Comments)     Leg pain     Seasonal Allergies Cough     Current Outpatient Medications   Medication     albuterol (ALBUTEROL) 108 (90 BASE) MCG/ACT inhaler     azelastine-fluticasone (DYMISTA) 137-50 MCG/ACT nasal spray     budesonide (PULMICORT) 0.5 MG/2ML neb solution     budesonide-formoterol (SYMBICORT) 160-4.5 MCG/ACT Inhaler     cetirizine (ZYRTEC) 10 MG tablet     dexlansoprazole (DEXILANT) 60 MG CPDR CR capsule     EPINEPHrine (EPIPEN) 0.3 MG/0.3ML injection     fluticasone (FLONASE) 50 MCG/ACT nasal spray     Hypertonic Nasal Wash (SINUS RINSE) bottle     loratadine (CLARITIN) 10 MG tablet     montelukast (SINGULAIR)  "10 MG tablet     multivitamin, therapeutic with minerals (MULTI-VITAMIN) TABS tablet     multivitamin, therapeutic with minerals (THERA-VIT-M) TABS     ORDER FOR ALLERGEN IMMUNOTHERAPY     pantoprazole sodium (PROTONIX) 40 MG packet     vitamin D (ERGOCALCIFEROL) 47638 UNIT capsule     Vitamin D, Cholecalciferol, 1000 UNITS TABS     No current facility-administered medications for this visit.       ROS: 10 point ROS neg other than the symptoms noted above in the HPI.  /72 (Cuff Size: Adult Regular)   Pulse 70   Temp 97.2  F (36.2  C) (Tympanic)   Ht 1.664 m (5' 5.5\")   Wt 87.1 kg (192 lb)   SpO2 98%   BMI 31.46 kg/m    General - The patient is well nourished and well developed, and appears to have good nutritional status.  Alert and oriented to person and place, interactive.  Head and Face - Normocephalic and atraumatic, with no gross asymmetry noted of the contour of the facial features.  The facial nerve is intact, with strong symmetric movements.  Neck-no palpable lymphadenopathy or thyroid mass.  Trachea is midline.  Eyes - Extraocular movements intact.   Ears- External auditory canals are patent, tympanic membranes are intact without effusion or worrisome retractions   Nose - Nasal mucosa is pink and moist with no abnormal mucus.     inferior turbinate hypertrophy  No polyps, masses, or purulence noted on examination.    Mouth - Examination of the oral cavity shows pink, healthy, moist mucosa.  No lesions or ulceration noted.  The dentition are in good repair.  The tongue is mobile and midline.  Throat - The walls of the oropharynx were smooth, pink, moist, symmetric, and had no lesions or ulcerations.  The tonsillar pillars and soft palate were symmetric.  The uvula was midline on elevation.        ASSESSMENT:    ICD-10-CM    1. Perennial allergic rhinitis with seasonal variation J30.89 montelukast (SINGULAIR) 10 MG tablet    J30.2 levocetirizine (XYZAL) 5 MG tablet     budesonide (PULMICORT) 0.5 " MG/2ML neb solution   2. Seasonal allergic rhinitis, unspecified trigger J30.2 montelukast (SINGULAIR) 10 MG tablet     levocetirizine (XYZAL) 5 MG tablet     budesonide (PULMICORT) 0.5 MG/2ML neb solution   3. Seasonal allergic rhinitis due to pollen J30.1 montelukast (SINGULAIR) 10 MG tablet     levocetirizine (XYZAL) 5 MG tablet     budesonide (PULMICORT) 0.5 MG/2ML neb solution     Refilled Singulair  Sent Xyzal in. Take one tablet daily.   Continue with Flonase.   Start budesonide rinses 1-2 times daily for congestion/ sinuses.     Change allergy shots to drops.     Martina Dominguez PA-C  [facility]  [phone]

## 2019-07-12 ENCOUNTER — OFFICE VISIT (OUTPATIENT)
Dept: ALLERGY | Facility: OTHER | Age: 39
End: 2019-07-12
Attending: PHYSICIAN ASSISTANT
Payer: COMMERCIAL

## 2019-07-12 DIAGNOSIS — J30.89 PERENNIAL ALLERGIC RHINITIS WITH SEASONAL VARIATION: Primary | ICD-10-CM

## 2019-07-12 DIAGNOSIS — J30.2 PERENNIAL ALLERGIC RHINITIS WITH SEASONAL VARIATION: Primary | ICD-10-CM

## 2019-07-12 NOTE — PROGRESS NOTES
Patient is identified by name and .    The patient presents today to  their new SLIT drops.  They have current epi pens with them today.  SHe has currently been on SCIT, and is aware of signs/symptoms of angioedema and anaphylaxis.       The s/s of local reactions, angioedema, and anaphylaxis are again discussed with the patient.  The patient is educated on EPI-PEN use.  The patient verbalized understanding of how and when to use their EPI-PEN.  A return demonstration was given by the patient in the office today..    The patient is taught how to use SLIT and all instructions regarding SLIT use are reviewed with them today.  They verbalized understanding.     They are aware additional drops can be obtained by calling [facility], and the drops will be mailed to them at home.    They are aware that they must have current epi pens through their entire SLIT treatment.    The first SLIT drop is taken by the patient in the office today.  The patient is held for 20 minutes after the drop administration to watch for possible reactions.  They experienced no reactions.     She is at maintenance with the drops, so will use a slow build up with 1 drop daily x 1 week, then 1 drop twice a day x 1 week and ending in 1 drop TID ongoing.  She verbalized understanding.       A  follow-up appoint will be needed 6 months from now.  They will call to arrange.  Ana Luisa Khan RN

## 2019-10-16 ENCOUNTER — APPOINTMENT (OUTPATIENT)
Dept: OBGYN | Age: 39
End: 2019-10-16

## 2019-11-07 ENCOUNTER — TELEPHONE (OUTPATIENT)
Dept: ALLERGY | Facility: OTHER | Age: 39
End: 2019-11-07

## 2019-11-07 NOTE — TELEPHONE ENCOUNTER
Attempted to reach patient regarding SLIT refill, no answer.  Left message for patient to return call.    Apoorva Ryan RN

## 2019-11-07 NOTE — TELEPHONE ENCOUNTER
Spoke with patient.  She states drops are going well.  Epi-pens are current.  She is not due for a follow-up at this time.  Will process refill request.    Apoorva Ryan RN

## 2019-12-04 ENCOUNTER — APPOINTMENT (OUTPATIENT)
Dept: FAMILY MEDICINE | Age: 39
End: 2019-12-04

## 2020-03-02 ENCOUNTER — HEALTH MAINTENANCE LETTER (OUTPATIENT)
Age: 40
End: 2020-03-02

## 2020-03-13 ENCOUNTER — ANCILLARY PROCEDURE (OUTPATIENT)
Dept: MAMMOGRAPHY | Facility: OTHER | Age: 40
End: 2020-03-13
Attending: FAMILY MEDICINE
Payer: COMMERCIAL

## 2020-03-13 DIAGNOSIS — Z12.31 VISIT FOR SCREENING MAMMOGRAM: ICD-10-CM

## 2020-03-13 PROCEDURE — 77063 BREAST TOMOSYNTHESIS BI: CPT | Mod: TC

## 2020-03-13 PROCEDURE — 77067 SCR MAMMO BI INCL CAD: CPT | Mod: TC

## 2020-07-24 ENCOUNTER — TELEPHONE (OUTPATIENT)
Dept: ALLERGY | Facility: OTHER | Age: 40
End: 2020-07-24

## 2020-07-24 DIAGNOSIS — J30.2 PERENNIAL ALLERGIC RHINITIS WITH SEASONAL VARIATION: ICD-10-CM

## 2020-07-24 DIAGNOSIS — J30.89 PERENNIAL ALLERGIC RHINITIS WITH SEASONAL VARIATION: ICD-10-CM

## 2020-12-20 ENCOUNTER — HEALTH MAINTENANCE LETTER (OUTPATIENT)
Age: 40
End: 2020-12-20

## 2021-02-19 ENCOUNTER — OFFICE VISIT (OUTPATIENT)
Dept: OBGYN | Facility: OTHER | Age: 41
End: 2021-02-19
Attending: NURSE PRACTITIONER
Payer: COMMERCIAL

## 2021-02-19 VITALS
SYSTOLIC BLOOD PRESSURE: 114 MMHG | BODY MASS INDEX: 28.12 KG/M2 | HEIGHT: 66 IN | WEIGHT: 175 LBS | HEART RATE: 75 BPM | OXYGEN SATURATION: 100 % | DIASTOLIC BLOOD PRESSURE: 73 MMHG

## 2021-02-19 DIAGNOSIS — Z12.4 SCREENING FOR CERVICAL CANCER: Primary | ICD-10-CM

## 2021-02-19 DIAGNOSIS — N89.8 VAGINAL DISCHARGE: ICD-10-CM

## 2021-02-19 DIAGNOSIS — Z12.31 ENCOUNTER FOR SCREENING MAMMOGRAM FOR BREAST CANCER: ICD-10-CM

## 2021-02-19 DIAGNOSIS — Z01.419 WELL WOMAN EXAM WITH ROUTINE GYNECOLOGICAL EXAM: ICD-10-CM

## 2021-02-19 LAB
SPECIMEN SOURCE: ABNORMAL
WET PREP SPEC: ABNORMAL

## 2021-02-19 PROCEDURE — 87210 SMEAR WET MOUNT SALINE/INK: CPT | Performed by: NURSE PRACTITIONER

## 2021-02-19 PROCEDURE — G0123 SCREEN CERV/VAG THIN LAYER: HCPCS | Performed by: NURSE PRACTITIONER

## 2021-02-19 PROCEDURE — 87624 HPV HI-RISK TYP POOLED RSLT: CPT | Performed by: NURSE PRACTITIONER

## 2021-02-19 PROCEDURE — 99396 PREV VISIT EST AGE 40-64: CPT | Performed by: NURSE PRACTITIONER

## 2021-02-19 ASSESSMENT — MIFFLIN-ST. JEOR: SCORE: 1462.6

## 2021-02-19 ASSESSMENT — PATIENT HEALTH QUESTIONNAIRE - PHQ9: SUM OF ALL RESPONSES TO PHQ QUESTIONS 1-9: 1

## 2021-02-19 ASSESSMENT — PAIN SCALES - GENERAL: PAINLEVEL: NO PAIN (0)

## 2021-02-19 NOTE — PROGRESS NOTES
CC:  Annual exam  HPI:  Sydni Isabel is a 40 year old female P2, CD. Son aged 20 and daughter 17. Patient's last menstrual period was 02/05/2021.  She is not sexually active.  Not using contraception.  Periods are regular with 2 days of heavy flow and lasting 4-5 days.  No cramping.  Nickel sized clots. Last exam was in 2014.  Works as an RN in [employer].  No other c/o.      Past GYN history:  No STD history  STI testing offered?  Declined       Last PAP smear:  Normal  Mammograms up to date: yes      No past medical history on file.    Past Surgical History:   Procedure Laterality Date     c section times 2       COLONOSCOPY - HIM SCAN  07/01/2013    Colonoscopy~Irritable bowel with a repeat age 48, 7/2013     DILATION AND CURETTAGE SUCTION  7/25/2014    Procedure: DILATION AND CURETTAGE SUCTION;  Surgeon: Oliver Webb MD;  Location: [facility]     ENDOSCOPIC SINUS SURGERY N/A 12/20/2017    Procedure: ENDOSCOPIC SINUS SURGERY;  ENDOSCOPIC SINUS SURGERY, SEPTOPLASTY, TURBINATE REDUCTION, REPAIR LEFT NASAL VALVE;  Surgeon: Yulisa Quiroz MD;  Location: [facility]     ENT SURGERY      sinus     ESOPHAGOSCOPY, GASTROSCOPY, DUODENOSCOPY (EGD), COMBINED N/A 1/8/2016    Procedure: COMBINED ESOPHAGOSCOPY, GASTROSCOPY, DUODENOSCOPY (EGD);  Surgeon: Francois Gordon MD;  Location: [facility]     GYN SURGERY  2000    d & c     SEPTOPLASTY, TURBINOPLASTY, COMBINED N/A 12/20/2017    Procedure: COMBINED SEPTOPLASTY, TURBINOPLASTY;;  Surgeon: Yulisa Quiroz MD;  Location: [facility] OR     wisdom teeth         Family History   Problem Relation Age of Onset     Hypertension Mother      High cholesterol Mother      Genitourinary Problems Mother         kidney stone     Hypertension Father      High cholesterol Father      Breast Cancer Maternal Grandmother        Current Outpatient Medications   Medication Sig Dispense Refill     albuterol (ALBUTEROL) 108 (90 BASE) MCG/ACT inhaler Inhale 1 puff into the lungs 3 times daily For 3-5 days. 1  "Inhaler 0     azelastine-fluticasone (DYMISTA) 137-50 MCG/ACT nasal spray Spray 1 spray into both nostrils 2 times daily 2 Bottle 3     budesonide-formoterol (SYMBICORT) 160-4.5 MCG/ACT Inhaler Inhale 2 puffs into the lungs 2 times daily       cetirizine (ZYRTEC) 10 MG tablet Take 1 tablet (10 mg) by mouth every evening 30 tablet 1     fluticasone (FLONASE) 50 MCG/ACT nasal spray Spray 2 sprays into both nostrils daily 16 g 11     loratadine (CLARITIN) 10 MG tablet TAKE 1 TABLET DAILY. 30 tablet 11     montelukast (SINGULAIR) 10 MG tablet TAKE ONE TABLET AT BEDTIME. 90 tablet 3     multivitamin, therapeutic with minerals (MULTI-VITAMIN) TABS tablet        pantoprazole sodium (PROTONIX) 40 MG packet Take 40 mg by mouth       vitamin D (ERGOCALCIFEROL) 74630 UNIT capsule Take 2,000 Units by mouth daily        EPINEPHrine (EPIPEN) 0.3 MG/0.3ML injection Inject 0.3 mLs into the muscle once as needed for anaphylaxis for 1 dose. (Patient not taking: Reported on 2/19/2021) 2 each 3       Allergies: Ciprofloxacin and Seasonal allergies    ROS:  CONSTITUTIONAL:NEGATIVE for fever, chills, change in weight  INTEGUMENTARY/SKIN: NEGATIVE for worrisome rashes, moles or lesions  RESP:NEGATIVE for significant cough or SOB  BREAST: NEGATIVE for masses, tenderness or discharge  CV: NEGATIVE for chest pain, palpitations or peripheral edema  GI: NEGATIVE for nausea, abdominal pain, heartburn, or change in bowel habits  : normal menstrual cycles  ENDOCRINE: NEGATIVE for temperature intolerance, skin/hair changes  PSYCHIATRIC: NEGATIVE for changes in mood or affect    EXAM:  Blood pressure 114/73, pulse 75, height 1.664 m (5' 5.5\"), weight 79.4 kg (175 lb), last menstrual period 02/05/2021, SpO2 100 %, not currently breastfeeding.   BMI= Body mass index is 28.68 kg/m .  General - pleasant female in no acute distress.  Neck - supple without lymphadenopathy or thyromegaly.  Lungs - clear to auscultation bilaterally.  Heart - regular rate " and rhythm without murmur.  Breast - no nodularity, asymmetry or nipple discharge bilaterally.  Abdomen - soft, nontender, nondistended, no hepatosplenomegaly.  Pelvic - EG: normal adult female, BUS: within normal limits, Vagina: well rugated, malodorous discharge, Cervix: no lesions or CMT, Uterus: firm, normal sized and nontender, Adnexae: no masses or tenderness.  Rectovaginal - deferred.  Musculoskeletal - no gross deformities.  Neurological - normal strength, sensation, and mental status.      ASSESSMENT/PLAN:  (Z12.4) Screening for cervical cancer  (primary encounter diagnosis)  Comment:   Plan: A pap thin layer screen with  HPV - recommended        age 30 - 65 years (select HPV order below), HPV        High Risk Types DNA Cervical            (N89.8) Vaginal discharge  Comment:   Plan: Wet prep            (Z12.31) Encounter for screening mammogram for breast cancer  Comment:   Plan: MA SCREENING DIGITAL BILATERAL ([place])            (Z01.419) Well woman exam with routine gynecological exam  Comment:   Plan: return annually and as needed.       Discussed exercise and healthy eating, including calcium intake.  She should return to the clinic in one year, or sooner if problems arise.

## 2021-02-19 NOTE — PATIENT INSTRUCTIONS
Patient Education     The Range of Pap Test Results  When your Pap test is sent to the lab, the lab studies your cell samples and reports any abnormal cell changes. Your healthcare provider can discuss these changes with you. In some cases, an abnormal Pap test is due to an infection. More serious cell changes range from dysplasia to cancer. Talk to your healthcare provider about your Pap test.     Normal results  Cervical cells, even normal ones, are always changing. As they mature, normal squamous cells move from deeper layers within the cervix. Over time, these cells flatten and cover the surface of the cervix. Within the cervical canal, the cells are different. These glandular cells are taller and not as flat as the cells on the surface of the cervix. When a Pap test sample shows healthy cells of both types, the results are negative. Keep having Pap tests as often as directed.   Abnormal results  A positive Pap test result means some cells in the sample showed abnormal changes. These results are grouped by the type of cell change and the location, or extent, of the changes. Depending on the results, you may need further testing.     Inflammation. Noncancerous changes are present. They may be due to normal cell repair. Or they may be caused by an infection, such as HPV or yeast. Further testing may be needed. (Also called reactive cellular changes.)    Atypical squamous cells. Test results are unclear. Cells on the surface of the cervix show changes, but their significance is not yet known. Testing for HPV and other sexually transmitted infections (STIs) may be needed. Treatment may be required. (Reported as ASC-US or ASC-H.)    Atypical glandular cells. Cells lining the cervical canal show abnormal changes. Further testing is likely. You may also have treatment to destroy or remove problem cells. (Reported as AGC.)    Mild dysplasia. Cells show distinct changes. More testing or HPV typing may be done. You may  also have treatment to destroy or remove problem cells. (Reported as low-grade OSMANY or RICHY 1.)    Moderate to severe dysplasia. Cells show precancerous changes. Or noninvasive cancer (carcinoma in situ) may be present. Treatment to destroy or remove problem cells is likely. (Reported as high-grade OSMANY or RICHY 2 or RICHY 3.)    Cancer. Different types of cancer may be detected by your Pap test. More tests to assess the cancer's extent are likely. The type of treatment will depend on the test results and other factors, such as age and health history. (Reported as squamous cell carcinoma, endocervical adenocarcinoma in situ, or adenocarcinoma.)  [employer] last reviewed this educational content on 6/1/2020 2000-2020 [employer]. [address] [place] [address], [place] [address]. All rights reserved. This information is not intended as a substitute for professional medical care. Always follow your healthcare professional's instructions.           Patient Education     Why Have a Pap Test?  Early on, cervical changes don't cause symptoms. Often the only way to know you have cervical changes is to do a Pap test. A Pap test can find these problems early, when they are easier to treat. Pap tests can also find some infections of the cervix and vagina.   What is a Pap test?  A Pap test is a procedure that helps find changes in the cervix that may lead to cancer. The cervix is the part of the uterus that opens into the vagina. For this test, a small sample of cells is taken from the cervix. This is done in your healthcare provider s office. The cells are then analyzed in a lab. A Pap test is a safe procedure. It takes just a few minutes and causes little or no discomfort.   The HPV connection  Human papillomavirus (HPV) is a family of viruses that spread through skin contact. Certain types are almost always spread through sexual contact. Some HPV types cause genital warts (condyloma). But not all types of HPV cause symptoms  you can see. Certain types cause cell changes (dysplasia) in the cervix that can lead to cancer. In fact, HPV infection is the most important risk factor for cervical cancer. Healthcare providers can now test for HPV. Testing for HPV is often done with the Pap test. That s why it s important to have Pap tests as advised by your healthcare provider. This helps ensure that any abnormal cells will be found and treated before they become cancer.   Who should have a Pap test and HPV test?  Ask your healthcare provider when to start having Pap tests, if you should have an HPV test done at the same time, and how often to have them. Follow these guidelines from the American Cancer Society for cervical cancer screening:     A first Pap test at age 21, and then every 3 years until age 29. HPV testing is not advised during this time. But it may be done to follow up on an abnormal Pap test.    Starting at age 30, the preferred testing is a Pap test done with an HPV test every 5 years. This should be done until age 65. Another option for women in this 30 to 65 age group is to have just the Pap test done every 3 years.    You may need a different screening schedule if you are at high risk for cervical cancer. Risk factors include having HIV, a weak immune system, or exposure to the medicine KENNY while your mother was pregnant with you. Talk with your provider about the best schedule for you.    If you re over 65 and have had regular screenings for the last 10 years with no abnormal results in the last 20 years, you may stop cervical cancer screening.    If you had a hysterectomy that included removing your cervix, you can stop screening unless the hysterectomy was done to treat cervical cancer or precancer. If you still have your cervix after the hysterectomy, you should keep screening according to the above guidelines.    Routine testing doesn't need to be done each year. But if your test is abnormal, your provider will let you  know how often to be tested.     Women who have been vaccinated for HPV should still follow these guidelines.    If you have had cervical cancer, talk with your provider about the screening plan that's best for you.  [employer] last reviewed this educational content on 6/1/2020 2000-2020 [employer], [employer]. [address] [place] [id number]. All rights reserved. This information is not intended as a substitute for professional medical care. Always follow your healthcare professional's instructions.           Patient Education     Breast Health: Breast Self-Awareness  What is breast self-awareness?  Breast self-awareness is knowing how your breasts normally look and feel. Your breasts change as you go through different stages of your life. So it s important to learn what is normal for your breasts. Knowing about your breasts helps you spot any changes in them right away. Tell your healthcare provider about any changes.   Why is breast self-awareness important?   Many experts now say that women should focus on breast self-awareness instead of doing a breast self-examination (BSE). These experts include the American Cancer Society and the American Congress of Obstetricians and Gynecologists. Some experts even advise not teaching women to do a BSE. That s because research hasn t shown a clear benefit to doing BSEs.   Breast self-awareness is different than a BSE. It isn t about following a certain method and schedule. It s about knowing what's normal for your breasts. That way you can spot even small changes right away. If you see any changes, tell your healthcare provider.   Changes to look for  Call your healthcare provider if you find any changes in your breasts that worry you. These changes may be:     A lump    Nipple discharge other than breast milk, especially if it's bloody    Swelling    A change in size or shape    Skin changes, such as redness, thickening, or dimpling of the skin    Swollen  lymph nodes in the armpit    Nipple problems, such as pain or redness  If you find a lump  Call your provider if you find lumpiness in one breast. Also call if you feel something different in the tissue or feel a definite lump. Sometimes lumpiness may be due to menstrual changes. But there may be reason for concern.   Your provider may want to see you right away if you have:     Nipple discharge that is bloody    Skin changes on your breast, such as dimpling or puckering  It s okay to be upset if you find a lump. Be sure to call your provider right away. Remember that most breast lumps are benign. This means they are not cancer.   [employer] last reviewed this educational content on 5/1/2020 2000-2020 [employer], [employer]. [address] [place] [address], [place] [address]. All rights reserved. This information is not intended as a substitute for professional medical care. Always follow your healthcare professional's instructions.           Patient Education     Vaginal Infection: Yeast (Candidiasis)  Yeast infection occurs when yeast in the vagina increase and attacks the vaginal tissues. Yeast is a type of fungus. These infections are often caused by a type of yeast called Candida albicans. Other species of yeast can also cause infections. Factors that may make infection more likely include recent antibiotic use, douching, or increased sex. Yeast infections are more common in women who have diabetes, or are obese or pregnant, or have a weak immune system.   Symptoms of yeast infection    Clumpy or thin, white discharge, which may look like cottage cheese    No odor or minimal odor    Severe vaginal itching or burning    Burning with urination    Swelling, redness of vulva    Pain during sex    Treating yeast infection  Yeast infection is treated with a vaginal antifungal cream. In some cases, antifungal pills are prescribed instead. During treatment:     Finish all of your medicine, even if your symptoms go  away.    Apply the cream before going to bed. Lie flat after applying so that it doesn't drip out.    Don't douche or use tampons.    Don't rely on a diaphragm or condoms, since the cream may weaken them.    Avoid intercourse if advised by your healthcare provider.  Should I treat a yeast infection myself?  Discuss with your healthcare provider whether you should use over-the-counter medicines to treat a yeast infection. Self-treatment may depend on whether:     You've had a yeast infection in the past.    You're at risk for sexually transmitted infections (STIs).  Call your healthcare provider if symptoms don't go away or come back after treatment.   [employer] last reviewed this educational content on 4/1/2020 2000-2020 [employer], [employer]. [address] [place] [address], [place] [address]. All rights reserved. This information is not intended as a substitute for professional medical care. Always follow your healthcare professional's instructions.

## 2021-02-19 NOTE — NURSING NOTE
"Chief Complaint   Patient presents with     Gyn Exam       Initial /73 (BP Location: Right arm, Patient Position: Sitting, Cuff Size: Adult Regular)   Pulse 75   Ht 1.664 m (5' 5.5\")   Wt 79.4 kg (175 lb)   SpO2 100%   BMI 28.68 kg/m   Estimated body mass index is 28.68 kg/m  as calculated from the following:    Height as of this encounter: 1.664 m (5' 5.5\").    Weight as of this encounter: 79.4 kg (175 lb).  Medication Reconciliation: complete     Thuy Herrera LPN    "

## 2021-02-24 LAB
COPATH REPORT: NORMAL
PAP: NORMAL

## 2021-02-26 LAB
FINAL DIAGNOSIS: NORMAL
HPV HR 12 DNA CVX QL NAA+PROBE: NEGATIVE
HPV16 DNA SPEC QL NAA+PROBE: NEGATIVE
HPV18 DNA SPEC QL NAA+PROBE: NEGATIVE
SPECIMEN DESCRIPTION: NORMAL
SPECIMEN SOURCE CVX/VAG CYTO: NORMAL

## 2021-04-16 ENCOUNTER — ANCILLARY PROCEDURE (OUTPATIENT)
Dept: MAMMOGRAPHY | Facility: OTHER | Age: 41
End: 2021-04-16
Attending: NURSE PRACTITIONER
Payer: COMMERCIAL

## 2021-04-16 DIAGNOSIS — Z12.31 ENCOUNTER FOR SCREENING MAMMOGRAM FOR BREAST CANCER: ICD-10-CM

## 2021-04-16 PROCEDURE — 77067 SCR MAMMO BI INCL CAD: CPT | Mod: TC | Performed by: RADIOLOGY

## 2021-04-16 PROCEDURE — 77063 BREAST TOMOSYNTHESIS BI: CPT | Mod: TC | Performed by: RADIOLOGY

## 2021-04-20 ENCOUNTER — TELEPHONE (OUTPATIENT)
Dept: FAMILY MEDICINE | Age: 41
End: 2021-04-20

## 2021-04-21 ENCOUNTER — LAB SERVICES (OUTPATIENT)
Dept: LAB | Age: 41
End: 2021-04-21

## 2021-04-21 ENCOUNTER — OFFICE VISIT (OUTPATIENT)
Dept: FAMILY MEDICINE | Age: 41
End: 2021-04-21

## 2021-04-21 VITALS
BODY MASS INDEX: 22.68 KG/M2 | WEIGHT: 128 LBS | HEIGHT: 63 IN | TEMPERATURE: 97.7 F | HEART RATE: 82 BPM | SYSTOLIC BLOOD PRESSURE: 102 MMHG | DIASTOLIC BLOOD PRESSURE: 70 MMHG

## 2021-04-21 DIAGNOSIS — E04.9 THYROID ENLARGED: ICD-10-CM

## 2021-04-21 DIAGNOSIS — K58.0 IRRITABLE BOWEL SYNDROME WITH DIARRHEA: ICD-10-CM

## 2021-04-21 DIAGNOSIS — G43.109 MIGRAINE WITH AURA AND WITHOUT STATUS MIGRAINOSUS, NOT INTRACTABLE: Primary | ICD-10-CM

## 2021-04-21 DIAGNOSIS — R42 VERTIGO: ICD-10-CM

## 2021-04-21 DIAGNOSIS — R51.9 WORSENING HEADACHES: ICD-10-CM

## 2021-04-21 LAB — TSH SERPL-ACNC: 1.28 MCUNITS/ML (ref 0.35–5)

## 2021-04-21 PROCEDURE — 84443 ASSAY THYROID STIM HORMONE: CPT | Performed by: INTERNAL MEDICINE

## 2021-04-21 PROCEDURE — 36415 COLL VENOUS BLD VENIPUNCTURE: CPT | Performed by: INTERNAL MEDICINE

## 2021-04-21 PROCEDURE — 99214 OFFICE O/P EST MOD 30 MIN: CPT | Performed by: FAMILY MEDICINE

## 2021-04-21 RX ORDER — PROPRANOLOL HYDROCHLORIDE 80 MG/1
80 CAPSULE, EXTENDED RELEASE ORAL NIGHTLY
Qty: 30 CAPSULE | Refills: 5 | Status: SHIPPED | OUTPATIENT
Start: 2021-04-21 | End: 2021-05-19

## 2021-04-21 RX ORDER — METOCLOPRAMIDE 10 MG/1
10 TABLET ORAL 4 TIMES DAILY PRN
Qty: 30 TABLET | Refills: 3 | Status: SHIPPED | OUTPATIENT
Start: 2021-04-21

## 2021-04-21 RX ORDER — SUMATRIPTAN 25 MG/1
TABLET, FILM COATED ORAL
Qty: 9 TABLET | Refills: 5 | Status: SHIPPED | OUTPATIENT
Start: 2021-04-21 | End: 2022-04-10 | Stop reason: SDUPTHER

## 2021-04-21 RX ORDER — DICYCLOMINE HYDROCHLORIDE 10 MG/1
10 CAPSULE ORAL PRN
Qty: 30 CAPSULE | Refills: 3 | Status: SHIPPED | OUTPATIENT
Start: 2021-04-21 | End: 2021-07-21 | Stop reason: SDUPTHER

## 2021-04-21 ASSESSMENT — PATIENT HEALTH QUESTIONNAIRE - PHQ9
2. FEELING DOWN, DEPRESSED OR HOPELESS: NOT AT ALL
1. LITTLE INTEREST OR PLEASURE IN DOING THINGS: NOT AT ALL
CLINICAL INTERPRETATION OF PHQ9 SCORE: NO FURTHER SCREENING NEEDED
CLINICAL INTERPRETATION OF PHQ2 SCORE: NO FURTHER SCREENING NEEDED
SUM OF ALL RESPONSES TO PHQ9 QUESTIONS 1 AND 2: 0
SUM OF ALL RESPONSES TO PHQ9 QUESTIONS 1 AND 2: 0

## 2021-04-27 ENCOUNTER — TELEPHONE (OUTPATIENT)
Dept: FAMILY MEDICINE | Age: 41
End: 2021-04-27

## 2021-05-04 ENCOUNTER — E-ADVICE (OUTPATIENT)
Dept: FAMILY MEDICINE | Age: 41
End: 2021-05-04

## 2021-05-17 ENCOUNTER — E-ADVICE (OUTPATIENT)
Dept: FAMILY MEDICINE | Age: 41
End: 2021-05-17

## 2021-05-19 ENCOUNTER — OFFICE VISIT (OUTPATIENT)
Dept: FAMILY MEDICINE | Age: 41
End: 2021-05-19

## 2021-05-19 VITALS
SYSTOLIC BLOOD PRESSURE: 118 MMHG | BODY MASS INDEX: 22.5 KG/M2 | WEIGHT: 127 LBS | HEART RATE: 76 BPM | DIASTOLIC BLOOD PRESSURE: 70 MMHG | HEIGHT: 63 IN

## 2021-05-19 DIAGNOSIS — R42 VERTIGO: ICD-10-CM

## 2021-05-19 DIAGNOSIS — E04.9 THYROID ENLARGED: ICD-10-CM

## 2021-05-19 DIAGNOSIS — G43.109 MIGRAINE WITH AURA AND WITHOUT STATUS MIGRAINOSUS, NOT INTRACTABLE: Primary | ICD-10-CM

## 2021-05-19 DIAGNOSIS — R41.3 MEMORY PROBLEM: ICD-10-CM

## 2021-05-19 PROCEDURE — 99214 OFFICE O/P EST MOD 30 MIN: CPT | Performed by: FAMILY MEDICINE

## 2021-05-19 RX ORDER — TOPIRAMATE 25 MG/1
25 TABLET ORAL 2 TIMES DAILY
Qty: 60 TABLET | Refills: 2 | Status: SHIPPED | OUTPATIENT
Start: 2021-05-19 | End: 2021-07-21 | Stop reason: DRUGHIGH

## 2021-05-21 ENCOUNTER — TELEPHONE (OUTPATIENT)
Dept: FAMILY MEDICINE | Age: 41
End: 2021-05-21

## 2021-06-26 ENCOUNTER — E-ADVICE (OUTPATIENT)
Dept: FAMILY MEDICINE | Age: 41
End: 2021-06-26

## 2021-07-21 ENCOUNTER — TELEPHONE (OUTPATIENT)
Dept: FAMILY MEDICINE | Age: 41
End: 2021-07-21

## 2021-07-21 DIAGNOSIS — K58.0 IRRITABLE BOWEL SYNDROME WITH DIARRHEA: ICD-10-CM

## 2021-07-21 RX ORDER — TOPIRAMATE 25 MG/1
TABLET ORAL
Qty: 141 TABLET | Refills: 3 | Status: SHIPPED | OUTPATIENT
Start: 2021-07-21

## 2021-07-21 RX ORDER — DICYCLOMINE HYDROCHLORIDE 10 MG/1
10 CAPSULE ORAL DAILY PRN
Qty: 30 CAPSULE | Refills: 3 | Status: SHIPPED | OUTPATIENT
Start: 2021-07-21

## 2021-07-21 RX ORDER — TOPIRAMATE 25 MG/1
25 TABLET ORAL 2 TIMES DAILY
Qty: 141 TABLET | Refills: 3 | Status: SHIPPED | OUTPATIENT
Start: 2021-07-21 | End: 2021-07-21 | Stop reason: CLARIF

## 2021-10-03 ENCOUNTER — HEALTH MAINTENANCE LETTER (OUTPATIENT)
Age: 41
End: 2021-10-03

## 2021-10-14 ENCOUNTER — TRANSFERRED RECORDS (OUTPATIENT)
Dept: HEALTH INFORMATION MANAGEMENT | Facility: HOSPITAL | Age: 41
End: 2021-10-14

## 2021-10-18 ENCOUNTER — TRANSFERRED RECORDS (OUTPATIENT)
Dept: HEALTH INFORMATION MANAGEMENT | Facility: HOSPITAL | Age: 41
End: 2021-10-18

## 2022-03-19 ENCOUNTER — HEALTH MAINTENANCE LETTER (OUTPATIENT)
Age: 42
End: 2022-03-19

## 2022-04-10 DIAGNOSIS — G43.109 MIGRAINE WITH AURA AND WITHOUT STATUS MIGRAINOSUS, NOT INTRACTABLE: ICD-10-CM

## 2022-04-11 RX ORDER — SUMATRIPTAN 25 MG/1
TABLET, FILM COATED ORAL
Qty: 9 TABLET | Refills: 5 | Status: SHIPPED | OUTPATIENT
Start: 2022-04-11

## 2022-04-21 ENCOUNTER — TELEPHONE (OUTPATIENT)
Dept: OBGYN | Facility: OTHER | Age: 42
End: 2022-04-21
Payer: COMMERCIAL

## 2022-05-12 ENCOUNTER — ANCILLARY PROCEDURE (OUTPATIENT)
Dept: MAMMOGRAPHY | Facility: OTHER | Age: 42
End: 2022-05-12
Attending: NURSE PRACTITIONER
Payer: COMMERCIAL

## 2022-05-12 DIAGNOSIS — Z12.31 VISIT FOR SCREENING MAMMOGRAM: ICD-10-CM

## 2022-05-12 PROCEDURE — 77067 SCR MAMMO BI INCL CAD: CPT | Mod: TC | Performed by: RADIOLOGY

## 2022-05-12 PROCEDURE — 77063 BREAST TOMOSYNTHESIS BI: CPT | Mod: TC | Performed by: RADIOLOGY

## 2022-05-13 ENCOUNTER — TELEPHONE (OUTPATIENT)
Dept: MAMMOGRAPHY | Facility: OTHER | Age: 42
End: 2022-05-13
Payer: COMMERCIAL

## 2022-05-24 PROBLEM — R45.89 ANXIETY ABOUT HEALTH: Status: ACTIVE | Noted: 2017-11-03

## 2022-05-24 PROBLEM — H52.13 MYOPIA OF BOTH EYES WITH ASTIGMATISM: Status: ACTIVE | Noted: 2021-02-11

## 2022-05-24 PROBLEM — Z46.0 CONTACT LENS/GLASSES FITTING: Status: ACTIVE | Noted: 2021-02-11

## 2022-05-24 PROBLEM — H52.203 MYOPIA OF BOTH EYES WITH ASTIGMATISM: Status: ACTIVE | Noted: 2021-02-11

## 2022-05-24 PROBLEM — Z83.511 FAMILY HISTORY OF GLAUCOMA IN FATHER: Status: ACTIVE | Noted: 2021-02-11

## 2022-05-24 PROBLEM — H11.153 PINGUECULA OF BOTH EYES: Status: ACTIVE | Noted: 2021-02-17

## 2022-05-24 PROBLEM — D23.10: Status: ACTIVE | Noted: 2021-02-17

## 2022-09-04 ENCOUNTER — HEALTH MAINTENANCE LETTER (OUTPATIENT)
Age: 42
End: 2022-09-04

## 2023-01-14 ENCOUNTER — HOSPITAL ENCOUNTER (EMERGENCY)
Facility: HOSPITAL | Age: 43
Discharge: HOME | End: 2023-01-15
Attending: EMERGENCY MEDICINE | Admitting: EMERGENCY MEDICINE
Payer: COMMERCIAL

## 2023-01-14 ENCOUNTER — APPOINTMENT (OUTPATIENT)
Dept: ULTRASOUND IMAGING | Facility: HOSPITAL | Age: 43
End: 2023-01-14
Attending: EMERGENCY MEDICINE
Payer: COMMERCIAL

## 2023-01-14 DIAGNOSIS — R53.81 MALAISE: ICD-10-CM

## 2023-01-14 DIAGNOSIS — R10.84 ABDOMINAL PAIN, GENERALIZED: ICD-10-CM

## 2023-01-14 LAB
ALBUMIN UR-MCNC: 10 MG/DL
ANION GAP SERPL CALCULATED.3IONS-SCNC: 9 MMOL/L (ref 7–15)
APPEARANCE UR: CLEAR
BACTERIA #/AREA URNS HPF: ABNORMAL /HPF
BASOPHILS # BLD AUTO: 0.1 10E3/UL (ref 0–0.2)
BASOPHILS NFR BLD AUTO: 1 %
BILIRUB UR QL STRIP: NEGATIVE
BUN SERPL-MCNC: 19.2 MG/DL (ref 6–20)
CALCIUM SERPL-MCNC: 9.3 MG/DL (ref 8.6–10)
CHLORIDE SERPL-SCNC: 104 MMOL/L (ref 98–107)
COLOR UR AUTO: YELLOW
CREAT SERPL-MCNC: 0.64 MG/DL (ref 0.51–0.95)
DEPRECATED HCO3 PLAS-SCNC: 25 MMOL/L (ref 22–29)
EOSINOPHIL # BLD AUTO: 0.2 10E3/UL (ref 0–0.7)
EOSINOPHIL NFR BLD AUTO: 3 %
ERYTHROCYTE [DISTWIDTH] IN BLOOD BY AUTOMATED COUNT: 14.5 % (ref 10–15)
GFR SERPL CREATININE-BSD FRML MDRD: >90 ML/MIN/1.73M2
GLUCOSE SERPL-MCNC: 102 MG/DL (ref 70–99)
GLUCOSE UR STRIP-MCNC: NEGATIVE MG/DL
HCT VFR BLD AUTO: 36 % (ref 35–47)
HGB BLD-MCNC: 11 G/DL (ref 11.7–15.7)
HGB UR QL STRIP: NEGATIVE
IMM GRANULOCYTES # BLD: 0 10E3/UL
IMM GRANULOCYTES NFR BLD: 0 %
KETONES UR STRIP-MCNC: NEGATIVE MG/DL
LEUKOCYTE ESTERASE UR QL STRIP: NEGATIVE
LYMPHOCYTES # BLD AUTO: 2.5 10E3/UL (ref 0.8–5.3)
LYMPHOCYTES NFR BLD AUTO: 37 %
MCH RBC QN AUTO: 23.7 PG (ref 26.5–33)
MCHC RBC AUTO-ENTMCNC: 30.6 G/DL (ref 31.5–36.5)
MCV RBC AUTO: 77 FL (ref 78–100)
MONOCYTES # BLD AUTO: 0.5 10E3/UL (ref 0–1.3)
MONOCYTES NFR BLD AUTO: 7 %
MUCOUS THREADS #/AREA URNS LPF: PRESENT /LPF
NEUTROPHILS # BLD AUTO: 3.5 10E3/UL (ref 1.6–8.3)
NEUTROPHILS NFR BLD AUTO: 52 %
NITRATE UR QL: NEGATIVE
NRBC # BLD AUTO: 0 10E3/UL
NRBC BLD AUTO-RTO: 0 /100
PH UR STRIP: 5.5 [PH] (ref 4.7–8)
PLATELET # BLD AUTO: 341 10E3/UL (ref 150–450)
POTASSIUM SERPL-SCNC: 3.8 MMOL/L (ref 3.4–5.3)
RBC # BLD AUTO: 4.65 10E6/UL (ref 3.8–5.2)
RBC URINE: <1 /HPF
SODIUM SERPL-SCNC: 138 MMOL/L (ref 136–145)
SP GR UR STRIP: 1.03 (ref 1–1.03)
SQUAMOUS EPITHELIAL: 4 /HPF
UROBILINOGEN UR STRIP-MCNC: 2 MG/DL
WBC # BLD AUTO: 6.9 10E3/UL (ref 4–11)
WBC URINE: 2 /HPF

## 2023-01-14 PROCEDURE — 81001 URINALYSIS AUTO W/SCOPE: CPT | Performed by: EMERGENCY MEDICINE

## 2023-01-14 PROCEDURE — 80048 BASIC METABOLIC PNL TOTAL CA: CPT | Performed by: EMERGENCY MEDICINE

## 2023-01-14 PROCEDURE — 84443 ASSAY THYROID STIM HORMONE: CPT | Performed by: EMERGENCY MEDICINE

## 2023-01-14 PROCEDURE — 99285 EMERGENCY DEPT VISIT HI MDM: CPT | Mod: 25,CS

## 2023-01-14 PROCEDURE — 87637 SARSCOV2&INF A&B&RSV AMP PRB: CPT | Performed by: EMERGENCY MEDICINE

## 2023-01-14 PROCEDURE — 76705 ECHO EXAM OF ABDOMEN: CPT | Mod: 26 | Performed by: EMERGENCY MEDICINE

## 2023-01-14 PROCEDURE — 99283 EMERGENCY DEPT VISIT LOW MDM: CPT | Mod: 25 | Performed by: EMERGENCY MEDICINE

## 2023-01-14 PROCEDURE — C9803 HOPD COVID-19 SPEC COLLECT: HCPCS

## 2023-01-14 PROCEDURE — 36415 COLL VENOUS BLD VENIPUNCTURE: CPT | Performed by: EMERGENCY MEDICINE

## 2023-01-14 PROCEDURE — 76705 ECHO EXAM OF ABDOMEN: CPT | Mod: TC

## 2023-01-14 PROCEDURE — 93005 ELECTROCARDIOGRAM TRACING: CPT

## 2023-01-14 PROCEDURE — 85025 COMPLETE CBC W/AUTO DIFF WBC: CPT | Performed by: EMERGENCY MEDICINE

## 2023-01-15 VITALS
TEMPERATURE: 98.1 F | SYSTOLIC BLOOD PRESSURE: 114 MMHG | BODY MASS INDEX: 28.82 KG/M2 | HEART RATE: 76 BPM | OXYGEN SATURATION: 99 % | RESPIRATION RATE: 16 BRPM | WEIGHT: 175.84 LBS | DIASTOLIC BLOOD PRESSURE: 62 MMHG

## 2023-01-15 LAB
FLUAV RNA SPEC QL NAA+PROBE: NEGATIVE
FLUBV RNA RESP QL NAA+PROBE: NEGATIVE
HOLD SPECIMEN: NORMAL
RSV RNA SPEC NAA+PROBE: NEGATIVE
SARS-COV-2 RNA RESP QL NAA+PROBE: NEGATIVE
TSH SERPL DL<=0.005 MIU/L-ACNC: 2.42 UIU/ML (ref 0.3–4.2)

## 2023-01-15 PROCEDURE — 93010 ELECTROCARDIOGRAM REPORT: CPT | Performed by: INTERNAL MEDICINE

## 2023-01-15 ASSESSMENT — ACTIVITIES OF DAILY LIVING (ADL): ADLS_ACUITY_SCORE: 35

## 2023-01-15 NOTE — ED NOTES
Face to face report given with opportunity to observe patient.    Report given to CHRISTINA Garg RN   1/14/2023  11:57 PM

## 2023-01-15 NOTE — ED TRIAGE NOTES
Patient presents with complaints of weakness that she states started today, well maybe yesterday. She states she is anemic and being worked up for that and decided maybe she has a bleeding ulcer as she has a hx of GERD and was just recently started on a different acid reducer. She states bowel movements are normal for her and they are brown.

## 2023-01-15 NOTE — ED NOTES
Patient presents with c/o intermittent abdominal pains for 2 months, also reports fatigue and dizziness. Recently had labs done with low hemoglobin, iron, and ferritin.

## 2023-01-19 ASSESSMENT — ENCOUNTER SYMPTOMS
SHORTNESS OF BREATH: 0
COUGH: 0
CHILLS: 0
FEVER: 0

## 2023-01-19 NOTE — ED PROVIDER NOTES
History     Chief Complaint   Patient presents with     Generalized Weakness     HPI  Sydni Isabel is a 42 year old female who is here with malaise.  Is concerned she may have low hemoglobin.  Has been worked up for anemia.  Compliant with her meds.  No chest pain or abdominal pain.  No systemic infectious symptoms.  Allergies:  Allergies   Allergen Reactions     Ciprofloxacin Other (See Comments)     Leg pain     Seasonal Allergies Cough       Problem List:    Patient Active Problem List    Diagnosis Date Noted     Papilloma of eyelid, unspecified laterality 2021     Priority: Medium     Formatting of this note might be different from the original.  both eyes       Pinguecula of both eyes 2021     Priority: Medium     Family history of glaucoma in father 2021     Priority: Medium     Myopia of both eyes with astigmatism 2021     Priority: Medium     Contact lens/glasses fitting 2021     Priority: Medium     Anxiety about health 2017     Priority: Medium     Perennial allergic rhinitis with seasonal variation 2017     Priority: Medium     Chronic maxillary sinusitis 07/10/2017     Priority: Medium     Chronic frontal sinusitis 07/10/2017     Priority: Medium     DNS (deviated nasal septum) 07/10/2017     Priority: Medium     Nasal turbinate hypertrophy 07/10/2017     Priority: Medium     Allergic rhinitis 2017     Priority: Medium     ACP (advance care planning) 2016     Priority: Medium     Advance Care Planning 2016: ACP Review of Chart / Resources Provided:  Reviewed chart for advance care plan.  Sydni Isabel has no plan or code status on file. Discussed available resources and provided with information. Confirmed code status reflects current choices pending further ACP discussions.  Confirmed/documented legally designated decision makers.  Added by Michelle Whitney             Threatened  2014     Priority: Medium     Problem list name  updated by automated process. Provider to review       Heartburn 04/22/2009     Priority: Medium     Lattice degeneration of peripheral retina 01/30/2009     Priority: Medium     Formatting of this note might be different from the original.  IMO Update 10/11          Past Medical History:    History reviewed. No pertinent past medical history.    Past Surgical History:    Past Surgical History:   Procedure Laterality Date     c section times 2       COLONOSCOPY - HIM SCAN  07/01/2013    Colonoscopy~Irritable bowel with a repeat age 48, 7/2013     DILATION AND CURETTAGE SUCTION  7/25/2014    Procedure: DILATION AND CURETTAGE SUCTION;  Surgeon: Oliver Webb MD;  Location: [facility]     ENDOSCOPIC SINUS SURGERY N/A 12/20/2017    Procedure: ENDOSCOPIC SINUS SURGERY;  ENDOSCOPIC SINUS SURGERY, SEPTOPLASTY, TURBINATE REDUCTION, REPAIR LEFT NASAL VALVE;  Surgeon: Yulisa Quiroz MD;  Location: [facility] OR     ENT SURGERY      sinus     ESOPHAGOSCOPY, GASTROSCOPY, DUODENOSCOPY (EGD), COMBINED N/A 1/8/2016    Procedure: COMBINED ESOPHAGOSCOPY, GASTROSCOPY, DUODENOSCOPY (EGD);  Surgeon: Francois Gordon MD;  Location: [facility] OR     GYN SURGERY  2000    d & c     SEPTOPLASTY, TURBINOPLASTY, COMBINED N/A 12/20/2017    Procedure: COMBINED SEPTOPLASTY, TURBINOPLASTY;;  Surgeon: Yulisa Quiroz MD;  Location: [facility] OR     wisdom teeth         Family History:    Family History   Problem Relation Age of Onset     Hypertension Mother      High cholesterol Mother      Genitourinary Problems Mother         kidney stone     Hypertension Father      High cholesterol Father      Breast Cancer Maternal Grandmother        Social History:  Marital Status:  Single [1]  Social History     Tobacco Use     Smoking status: Never     Smokeless tobacco: Never   Substance Use Topics     Alcohol use: No     Drug use: No        Medications:    albuterol (ALBUTEROL) 108 (90 BASE) MCG/ACT inhaler  azelastine-fluticasone (DYMISTA) 137-50 MCG/ACT nasal  spray  budesonide-formoterol (SYMBICORT) 160-4.5 MCG/ACT Inhaler  cetirizine (ZYRTEC) 10 MG tablet  EPINEPHrine (EPIPEN) 0.3 MG/0.3ML injection  fluticasone (FLONASE) 50 MCG/ACT nasal spray  loratadine (CLARITIN) 10 MG tablet  montelukast (SINGULAIR) 10 MG tablet  multivitamin, therapeutic with minerals (MULTI-VITAMIN) TABS tablet  pantoprazole sodium (PROTONIX) 40 MG packet  vitamin D (ERGOCALCIFEROL) 69129 UNIT capsule          Review of Systems   Constitutional: Negative for chills and fever.   Respiratory: Negative for cough and shortness of breath.    All other systems reviewed and are negative.      Physical Exam   BP: 117/65  Pulse: 88  Temp: 98.1  F (36.7  C)  Resp: 16  Weight: 79.8 kg (175 lb 13.4 oz)  SpO2: 98 %      Physical Exam  Constitutional:       General: She is not in acute distress.     Appearance: She is not diaphoretic.   HENT:      Head: Normocephalic and atraumatic.      Right Ear: External ear normal.      Left Ear: External ear normal.      Nose: No congestion or rhinorrhea.      Mouth/Throat:      Pharynx: Oropharynx is clear. No oropharyngeal exudate.   Eyes:      General: No scleral icterus.     Pupils: Pupils are equal, round, and reactive to light.   Cardiovascular:      Rate and Rhythm: Normal rate and regular rhythm.      Heart sounds: Normal heart sounds.   Pulmonary:      Effort: No respiratory distress.      Breath sounds: Normal breath sounds.   Abdominal:      General: Bowel sounds are normal.      Palpations: Abdomen is soft.      Tenderness: There is no abdominal tenderness.   Musculoskeletal:         General: No tenderness.      Cervical back: Normal range of motion and neck supple.      Right lower leg: No edema.      Left lower leg: No edema.   Skin:     General: Skin is warm.      Capillary Refill: Capillary refill takes less than 2 seconds.      Findings: No rash.   Neurological:      Mental Status: Mental status is at baseline.      Cranial Nerves: No cranial nerve  deficit.   Psychiatric:         Mood and Affect: Mood normal.         Behavior: Behavior normal.         ED Course                 Procedures          Critical Care time:               No results found for this or any previous visit (from the past 24 hour(s)).    Medications - No data to display    Assessments & Plan (with Medical Decision Making)     I have reviewed the nursing notes.    I have reviewed the findings, diagnosis, plan and need for follow up with the patient.      Medical Decision Making  The patient presented with a problem that is a stable chronic illness.    The patient's evaluation involved:  ordering and review of 3+ test(s) (see separate area of note for details)    The patient's management involved only simple and very low risk treatment.    Chronic anemia, encouraged to continue with her regiment.    Discharge Medication List as of 1/15/2023 12:54 AM          Final diagnoses:   Malaise   Abdominal pain, generalized       1/14/2023   [facility]     Lasha Mosqueda MD  01/19/23 0577

## 2023-01-27 ENCOUNTER — TELEPHONE (OUTPATIENT)
Dept: ALLERGY | Facility: OTHER | Age: 43
End: 2023-01-27

## 2023-01-27 ENCOUNTER — OFFICE VISIT (OUTPATIENT)
Dept: OTOLARYNGOLOGY | Facility: OTHER | Age: 43
End: 2023-01-27
Attending: PHYSICIAN ASSISTANT
Payer: COMMERCIAL

## 2023-01-27 VITALS
SYSTOLIC BLOOD PRESSURE: 118 MMHG | OXYGEN SATURATION: 100 % | HEIGHT: 67 IN | WEIGHT: 150 LBS | HEART RATE: 78 BPM | DIASTOLIC BLOOD PRESSURE: 68 MMHG | TEMPERATURE: 98.5 F | BODY MASS INDEX: 23.54 KG/M2

## 2023-01-27 DIAGNOSIS — Z98.890 HX OF ENDOSCOPIC SINUS SURGERY: ICD-10-CM

## 2023-01-27 DIAGNOSIS — J30.89 PERENNIAL ALLERGIC RHINITIS: Primary | ICD-10-CM

## 2023-01-27 DIAGNOSIS — J34.3 NASAL TURBINATE HYPERTROPHY: ICD-10-CM

## 2023-01-27 PROCEDURE — 99214 OFFICE O/P EST MOD 30 MIN: CPT | Performed by: PHYSICIAN ASSISTANT

## 2023-01-27 RX ORDER — BUDESONIDE 0.5 MG/2ML
0.5 INHALANT ORAL 2 TIMES DAILY
Qty: 120 ML | Refills: 1 | Status: SHIPPED | OUTPATIENT
Start: 2023-01-27

## 2023-01-27 ASSESSMENT — PAIN SCALES - GENERAL: PAINLEVEL: MODERATE PAIN (4)

## 2023-01-27 NOTE — PATIENT INSTRUCTIONS
Return for allergy skin testing.   Continue with current medications. Hold antihistamines prior to allergy testing.   Budesonide rinses sent in. May try as needed or daily    Thank you for allowing Martina Dominguez PA-C and our ENT team to participate in your care.  If your medications are too expensive, please give the nurse a call.  We can possibly change this medication.  If you have a scheduling or an appointment question please contact our Health Unit Coordinator at [phone], Ext. [phone].    ALL nursing questions or concerns can be directed to your ENT nurse at: [phone] [employer]      Budesonide nasal saline irrigation per instructions:  -Obtain Pa Med Sinus rinse over the counter.    -Use warm distilled water and 2 packets of the salt solution that comes with the bottle, dissolve in bottle up to the 240 mL lana.  -Add 1 vial of budesonide.  -Irrigate each side of your nose leaning over the sink, using 1/3 to 1/2 the volume of the bottle in each nostril every irrigation.  Irrigate 2 times daily.  -If additional rinses are needed/recommended, you may use the plan Pa Med Sinus irrigation without the use of added budesonide

## 2023-01-27 NOTE — PROGRESS NOTES
Chief Complaint   Patient presents with     Allergies     Allergies       Sydni presents for allergy concerns. She was last seen in ENT on 6/14/19 and was started on SLIT from [facility].   Today, she presents for ongoing concerns regarding allergies, congestion. Patient returns for worsening allergy symptoms.   Increase in sneezing, chest/ throat tightness, sinus concerns.   She has been on Claritin or Zyrtec daily.   Using Flonase PRN seasonal changes.   Singulair PRN seasonal changes.   Symbicort PRN.  Rinses/ Nasal saline PRN.     MQT- 8/5/16  Dilution #6: Dust  Dilution #5: Grass, Cat, dog  Dilution #2: Trees, molds, molds  Hx of perennial allergic rhinitis with seasonal exacerbation   last note reviewed: using rosalba med, Flonase, claritin and singulari  She was on Red vial in 12/2018. She did not complete full course of SCIT.       Resides in a house with a basement  There is no water or mold  Carpet in bedroom - No.   Heat in home- Forced air, gas.   Animals- cat.   Family hx of allergies- Yes  Past trials of medications Claritin, Zyrtec, Xyzal, Allegra.        12/20/17  PROCEDURES PERFORMED:   1.  Bilateral endoscopic frontal sinusotomy  2.  Bilateral total ethmoidectomy  3.  Bilateral maxillary antrostomy with tissue removal   4.  Septoplasty with cartilage reinsertion  5.  Repair left nasal valve using septal cartilage graft  6.  Bilateral submucosal inferior turbinate reduction    Sinus procedures above performed using [employer]  navigation.     SURGEON: Yulisa Quiroz D.O.  BLOOD LOSS: 5 ml   SPECIMENS: to pathology  ANESTHESIA: General Endotracheal   FINDINGS:  Large frontal sinus volume bilaterally  Large ethmoid bulla bilaterally,  partially occlusive to natural maxillary ostia  Left external and internal nasal valve collapse      No past medical history on file.     Allergies   Allergen Reactions     Ciprofloxacin Other (See Comments)     Leg pain     Seasonal Allergies Cough     Current Outpatient  "Medications   Medication     albuterol (ALBUTEROL) 108 (90 BASE) MCG/ACT inhaler     azelastine-fluticasone (DYMISTA) 137-50 MCG/ACT nasal spray     budesonide-formoterol (SYMBICORT) 160-4.5 MCG/ACT Inhaler     cetirizine (ZYRTEC) 10 MG tablet     EPINEPHrine (EPIPEN) 0.3 MG/0.3ML injection     fluticasone (FLONASE) 50 MCG/ACT nasal spray     loratadine (CLARITIN) 10 MG tablet     montelukast (SINGULAIR) 10 MG tablet     multivitamin, therapeutic with minerals (MULTI-VITAMIN) TABS tablet     pantoprazole sodium (PROTONIX) 40 MG packet     vitamin D (ERGOCALCIFEROL) 41824 UNIT capsule     No current facility-administered medications for this visit.     ROS- SEE HPI  /68 (BP Location: Right arm, Cuff Size: Adult Regular)   Pulse 78   Temp 98.5  F (36.9  C) (Tympanic)   Ht 1.689 m (5' 6.5\")   Wt 68 kg (150 lb)   LMP 01/06/2023 (Exact Date)   SpO2 100%   BMI 23.85 kg/m      General - The patient is well nourished and well developed, and appears to have good nutritional status.  Alert and oriented to person and place, interactive.  Head and Face - Normocephalic and atraumatic, with no gross asymmetry noted of the contour of the facial features.  The facial nerve is intact, with strong symmetric movements.  Neck-no palpable lymphadenopathy or thyroid mass.  Trachea is midline.  Eyes - Extraocular movements intact.   Ears- External auditory canals are patent, tympanic membranes are intact without effusion or worrisome retractions   Nose - Nasal mucosa is pink and moist with no abnormal mucus.     inferior turbinate hypertrophy  No polyps, masses, or purulence noted on examination.    Mouth - Examination of the oral cavity shows pink, healthy, moist mucosa.  No lesions or ulceration noted.  The dentition are in good repair.  The tongue is mobile and midline.  Throat - The walls of the oropharynx were smooth, pink, moist, symmetric, and had no lesions or ulcerations.  The tonsillar pillars and soft palate were " symmetric.  The uvula was midline on elevation.          ASSESSMENT/ PLAN:      ICD-10-CM    1. Perennial allergic rhinitis  J30.89 budesonide (PULMICORT) 0.5 MG/2ML neb solution      2. Hx of endoscopic sinus surgery  Z98.890 budesonide (PULMICORT) 0.5 MG/2ML neb solution      3. Nasal turbinate hypertrophy  J34.3 budesonide (PULMICORT) 0.5 MG/2ML neb solution              Return for allergy skin testing.   Continue with current medications. Hold antihistamines prior to allergy testing.   Budesonide rinses sent in. May try as needed or daily    Indications for allergy testing include:   1) Confirm suspicion of allergic rhinitis due to inhalant allergies  2) Identify the offending allergen to determine specific mode of treatment  3) In the case of chronic rhinosinusitis: when symptoms are not controlled by avoidance and pharmacotherapy  4) In the Asthma patient when exacerbations may be due to perennial allergen exposure  5) Suspect food allergy  6) Otitis Media, chronic rhinitis, atopic dermatitis, Meniere disease, headache, pharyngitis or eye symptoms    Due to allergies, modified quantitative testing (MQT) will be performed.  Signed consent was obtained, and the risks of immunotherapy were discussed, including the potential for anaphylaxis.    If immunotherapy (IT) is recommended, there is continued risk of anaphylaxis.   Anaphylaxis can cause death. The patient will need to be monitored for 30 minutes post injection.  They must present their epinephrine pen prior to injection.  Subcutaneous as well as sublingual immunotherapy (SLIT) were discussed as potential treatment options.  The patient was told SLIT is not approved by the FDA and is cash pay.  The general time frame of immunotherapy was discussed (generally 3-5 years, sometimes longer), and the basic immunology behind IT was discussed.      Martina Dominguez PA-C  ENT  [facility], [place]

## 2023-01-27 NOTE — TELEPHONE ENCOUNTER
Attempted to reach her to discuss MQT instructions.  No answer, left a message requesting a return call.    Kenroy Parks RN on 1/27/2023 at 3:16 PM

## 2023-01-27 NOTE — LETTER
1/27/2023         RE: Sydni Isabel  [address] [facility] [address]        Dear Colleague,    Thank you for referring your patient, Sydni Isabel, to the [facility]. Please see a copy of my visit note below.    Chief Complaint   Patient presents with     Allergies     Allergies       Sydni presents for allergy concerns. She was last seen in ENT on 6/14/19 and was started on SLIT from [facility].   Today, she presents for ongoing concerns regarding allergies, congestion. Patient returns for worsening allergy symptoms.   Increase in sneezing, chest/ throat tightness, sinus concerns.   She has been on Claritin or Zyrtec daily.   Using Flonase PRN seasonal changes.   Singulair PRN seasonal changes.   Symbicort PRN.  Rinses/ Nasal saline PRN.     MQT- 8/5/16  Dilution #6: Dust  Dilution #5: Grass, Cat, dog  Dilution #2: Trees, molds, molds  Hx of perennial allergic rhinitis with seasonal exacerbation   last note reviewed: using rosalba med, Flonase, claritin and singulari  She was on Red vial in 12/2018. She did not complete full course of SCIT.       Resides in a house with a basement  There is no water or mold  Carpet in bedroom - No.   Heat in home- Forced air, gas.   Animals- cat.   Family hx of allergies- Yes  Past trials of medications Claritin, Zyrtec, Xyzal, Allegra.        12/20/17  PROCEDURES PERFORMED:   1.  Bilateral endoscopic frontal sinusotomy  2.  Bilateral total ethmoidectomy  3.  Bilateral maxillary antrostomy with tissue removal   4.  Septoplasty with cartilage reinsertion  5.  Repair left nasal valve using septal cartilage graft  6.  Bilateral submucosal inferior turbinate reduction    Sinus procedures above performed using [employer]  navigation.     SURGEON: Yulisa Quiroz D.O.  BLOOD LOSS: 5 ml   SPECIMENS: to pathology  ANESTHESIA: General Endotracheal   FINDINGS:  Large frontal sinus volume bilaterally  Large ethmoid bulla bilaterally,  partially occlusive to  "natural maxillary ostia  Left external and internal nasal valve collapse      No past medical history on file.     Allergies   Allergen Reactions     Ciprofloxacin Other (See Comments)     Leg pain     Seasonal Allergies Cough     Current Outpatient Medications   Medication     albuterol (ALBUTEROL) 108 (90 BASE) MCG/ACT inhaler     azelastine-fluticasone (DYMISTA) 137-50 MCG/ACT nasal spray     budesonide-formoterol (SYMBICORT) 160-4.5 MCG/ACT Inhaler     cetirizine (ZYRTEC) 10 MG tablet     EPINEPHrine (EPIPEN) 0.3 MG/0.3ML injection     fluticasone (FLONASE) 50 MCG/ACT nasal spray     loratadine (CLARITIN) 10 MG tablet     montelukast (SINGULAIR) 10 MG tablet     multivitamin, therapeutic with minerals (MULTI-VITAMIN) TABS tablet     pantoprazole sodium (PROTONIX) 40 MG packet     vitamin D (ERGOCALCIFEROL) 48984 UNIT capsule     No current facility-administered medications for this visit.     ROS- SEE HPI  /68 (BP Location: Right arm, Cuff Size: Adult Regular)   Pulse 78   Temp 98.5  F (36.9  C) (Tympanic)   Ht 1.689 m (5' 6.5\")   Wt 68 kg (150 lb)   LMP 01/06/2023 (Exact Date)   SpO2 100%   BMI 23.85 kg/m      General - The patient is well nourished and well developed, and appears to have good nutritional status.  Alert and oriented to person and place, interactive.  Head and Face - Normocephalic and atraumatic, with no gross asymmetry noted of the contour of the facial features.  The facial nerve is intact, with strong symmetric movements.  Neck-no palpable lymphadenopathy or thyroid mass.  Trachea is midline.  Eyes - Extraocular movements intact.   Ears- External auditory canals are patent, tympanic membranes are intact without effusion or worrisome retractions   Nose - Nasal mucosa is pink and moist with no abnormal mucus.     inferior turbinate hypertrophy  No polyps, masses, or purulence noted on examination.    Mouth - Examination of the oral cavity shows pink, healthy, moist mucosa.  No " lesions or ulceration noted.  The dentition are in good repair.  The tongue is mobile and midline.  Throat - The walls of the oropharynx were smooth, pink, moist, symmetric, and had no lesions or ulcerations.  The tonsillar pillars and soft palate were symmetric.  The uvula was midline on elevation.          ASSESSMENT/ PLAN:      ICD-10-CM    1. Perennial allergic rhinitis  J30.89 budesonide (PULMICORT) 0.5 MG/2ML neb solution      2. Hx of endoscopic sinus surgery  Z98.890 budesonide (PULMICORT) 0.5 MG/2ML neb solution      3. Nasal turbinate hypertrophy  J34.3 budesonide (PULMICORT) 0.5 MG/2ML neb solution              Return for allergy skin testing.   Continue with current medications. Hold antihistamines prior to allergy testing.   Budesonide rinses sent in. May try as needed or daily    Indications for allergy testing include:   1) Confirm suspicion of allergic rhinitis due to inhalant allergies  2) Identify the offending allergen to determine specific mode of treatment  3) In the case of chronic rhinosinusitis: when symptoms are not controlled by avoidance and pharmacotherapy  4) In the Asthma patient when exacerbations may be due to perennial allergen exposure  5) Suspect food allergy  6) Otitis Media, chronic rhinitis, atopic dermatitis, Meniere disease, headache, pharyngitis or eye symptoms    Due to allergies, modified quantitative testing (MQT) will be performed.  Signed consent was obtained, and the risks of immunotherapy were discussed, including the potential for anaphylaxis.    If immunotherapy (IT) is recommended, there is continued risk of anaphylaxis.   Anaphylaxis can cause death. The patient will need to be monitored for 30 minutes post injection.  They must present their epinephrine pen prior to injection.  Subcutaneous as well as sublingual immunotherapy (SLIT) were discussed as potential treatment options.  The patient was told SLIT is not approved by the FDA and is cash pay.  The general  time frame of immunotherapy was discussed (generally 3-5 years, sometimes longer), and the basic immunology behind IT was discussed.      Martina Dominguez PA-C  ENT  [facility] [place], [place]             Again, thank you for allowing me to participate in the care of your patient.        Sincerely,        Martina Dominguez PA-C

## 2023-01-30 NOTE — TELEPHONE ENCOUNTER
Attempted to reach her again.  No answer, left a message requesting a return call.    Kenroy Parks RN on 1/30/2023 at 1:40 PM

## 2023-02-02 NOTE — TELEPHONE ENCOUNTER
Attempted to reach her again to discuss allergy testing instructions.  No answer, left a message requesting a return call.    Kenroy Parks RN on 2/2/2023 at 9:42 AM

## 2023-02-02 NOTE — TELEPHONE ENCOUNTER
Went over instructions with patient for allergy skin testing.  Reviewed patients current medications and patient will avoid all contraindicated medications prior to MQT testing.  Patient verbalizes understanding.  Copy of allergy testing packet will be mailed to the patient.  Patient is aware that she will be called to schedule her testing.  She is advised to call if she has any questions.    Kenroy Parks RN on 2/2/2023 at 11:19 AM

## 2023-02-03 ENCOUNTER — OFFICE VISIT (OUTPATIENT)
Dept: OBGYN | Facility: OTHER | Age: 43
End: 2023-02-03
Attending: NURSE PRACTITIONER
Payer: COMMERCIAL

## 2023-02-03 VITALS
HEART RATE: 69 BPM | WEIGHT: 150 LBS | SYSTOLIC BLOOD PRESSURE: 106 MMHG | BODY MASS INDEX: 23.54 KG/M2 | DIASTOLIC BLOOD PRESSURE: 67 MMHG | OXYGEN SATURATION: 100 % | HEIGHT: 67 IN

## 2023-02-03 DIAGNOSIS — N94.10 DYSPAREUNIA, FEMALE: ICD-10-CM

## 2023-02-03 DIAGNOSIS — N92.0 MENORRHAGIA WITH REGULAR CYCLE: Primary | ICD-10-CM

## 2023-02-03 PROCEDURE — 99213 OFFICE O/P EST LOW 20 MIN: CPT | Performed by: NURSE PRACTITIONER

## 2023-02-03 ASSESSMENT — PAIN SCALES - GENERAL: PAINLEVEL: MILD PAIN (3)

## 2023-02-07 NOTE — PROGRESS NOTES
"[facility]                HPI   Sydni is here today with 1 year history of increasingly heavy and painful periods. , both CD's.  Periods are regular every 25-28 days. She has one week of PMS bloating, fatigue, irritability, headaches, and cramping.  The first 2-4 days are very heavy requiring double protection and use of large incontinence pads. Days 5-6 are  Light with cramping.  She has also been experiencing dyspareunia with sx being more vaginal than deep pain.  LMP 23. Pap and HPV negative 21.             Medications:     Current Outpatient Medications Ordered in Epic   Medication     albuterol (ALBUTEROL) 108 (90 BASE) MCG/ACT inhaler     budesonide (PULMICORT) 0.5 MG/2ML neb solution     budesonide-formoterol (SYMBICORT) 160-4.5 MCG/ACT Inhaler     cetirizine (ZYRTEC) 10 MG tablet     fluticasone (FLONASE) 50 MCG/ACT nasal spray     loratadine (CLARITIN) 10 MG tablet     montelukast (SINGULAIR) 10 MG tablet     multivitamin w/minerals (THERA-VIT-M) tablet     pantoprazole sodium (PROTONIX) 40 MG packet     vitamin D (ERGOCALCIFEROL) 34672 UNIT capsule     azelastine-fluticasone (DYMISTA) 137-50 MCG/ACT nasal spray     EPINEPHrine (EPIPEN) 0.3 MG/0.3ML injection     No current [facility]-ordered facility-administered medications on file.                Allergies:   Ciprofloxacin and Seasonal allergies         Review of Systems:   The 5 point Review of Systems is negative other than noted in the HPI                     Physical Exam:   Blood pressure 106/67, pulse 69, height 1.689 m (5' 6.5\"), weight 68 kg (150 lb), last menstrual period 2023, SpO2 100 %, not currently breastfeeding.  Constitutional:   awake, alert, cooperative, no apparent distress, and appears stated age     Abdomen:   soft, non-distended and non-tender     Genitounirinary:   External Genitalia:  General appearance; normal  Vagina:  Estrogen effect normal, Discharge absent, Lesions absent  Cervix:  Lesions " absent, Discharge absent, Tenderness absent  Uterus: normal size and non tender.  Adnexa non-palpable.               Data:   -  Hgb reported from yesterday as 10.4  Pelvic US pending.           Assessment and Plan:   menorrhagia - pelvic US pending.  May consider emb based on findings.  Will follow up as results are available.      Mercedes Christensen NP, CFNP  2/7/2023  1:00 PM

## 2023-02-08 ENCOUNTER — HOSPITAL ENCOUNTER (OUTPATIENT)
Dept: ULTRASOUND IMAGING | Facility: HOSPITAL | Age: 43
Discharge: HOME | End: 2023-02-08
Attending: NURSE PRACTITIONER | Admitting: NURSE PRACTITIONER
Payer: COMMERCIAL

## 2023-02-08 DIAGNOSIS — N92.0 MENORRHAGIA WITH REGULAR CYCLE: ICD-10-CM

## 2023-02-08 PROCEDURE — 76830 TRANSVAGINAL US NON-OB: CPT

## 2023-02-09 ENCOUNTER — TELEPHONE (OUTPATIENT)
Dept: OBGYN | Facility: OTHER | Age: 43
End: 2023-02-09

## 2023-02-09 NOTE — TELEPHONE ENCOUNTER
Patient stated needing an appointment with Dr Webb regarding ultrasound results. Patient's message was very broken up so hard to understand. Please call patient back at [phone]

## 2023-02-23 ENCOUNTER — OFFICE VISIT (OUTPATIENT)
Dept: OBGYN | Facility: OTHER | Age: 43
End: 2023-02-23
Attending: OBSTETRICS & GYNECOLOGY
Payer: COMMERCIAL

## 2023-02-23 ENCOUNTER — PREP FOR PROCEDURE (OUTPATIENT)
Dept: OBGYN | Facility: OTHER | Age: 43
End: 2023-02-23

## 2023-02-23 VITALS
SYSTOLIC BLOOD PRESSURE: 103 MMHG | OXYGEN SATURATION: 100 % | DIASTOLIC BLOOD PRESSURE: 64 MMHG | HEIGHT: 67 IN | HEART RATE: 71 BPM | WEIGHT: 150 LBS | BODY MASS INDEX: 23.54 KG/M2

## 2023-02-23 DIAGNOSIS — D50.0 IRON DEFICIENCY ANEMIA DUE TO CHRONIC BLOOD LOSS: ICD-10-CM

## 2023-02-23 DIAGNOSIS — D25.0 SUBMUCOUS LEIOMYOMA OF UTERUS: ICD-10-CM

## 2023-02-23 DIAGNOSIS — N92.0 MENORRHAGIA: Primary | ICD-10-CM

## 2023-02-23 DIAGNOSIS — N92.0 MENORRHAGIA WITH REGULAR CYCLE: Primary | ICD-10-CM

## 2023-02-23 DIAGNOSIS — D25.0 FIBROIDS, SUBMUCOSAL: ICD-10-CM

## 2023-02-23 PROCEDURE — 99214 OFFICE O/P EST MOD 30 MIN: CPT | Performed by: OBSTETRICS & GYNECOLOGY

## 2023-02-23 ASSESSMENT — PAIN SCALES - GENERAL: PAINLEVEL: NO PAIN (0)

## 2023-02-23 NOTE — PROGRESS NOTES
CC:  Consult from Mercedes Christensen NP for menorrhagia/anemia  HPI:  Sydni Isabel is a 42 year old female P2 (CS). Patient's last menstrual period was 2023 (exact date)..  Menses are Regular lasting 5 days with 3 of heavy flow.  Her menstrual flow is limiting her clothing choices and interferes with lifestyle/activites.   Sydni is here today with 1 year history of increasingly heavy and painful periods. , both CD's.  Periods are regular every 25-28 days. She has one week of PMS bloating, fatigue, irritability, headaches, and cramping.  The first 2-4 days are very heavy requiring double protection and use of large incontinence pads. Days 5-6 are  Light with cramping.  She has also been experiencing dyspareunia with sx being more vaginal than deep pain.  LMP 23. Pap and HPV negative 21.  She is on iron for anemia.  Recent US showed possible submucosal fibroids/polyps    Clots: Yes  Intermenstrual bleeding: No  Post-coital bleeding: No  Previous work-up:Yes  Contraception: Abstinence/not currently sexually active  Abnormal Pap: No  Dysmenorrhea: Yes, with heavy bleeding.   Pelvic pain:No  Some pressure/LBP prior to menses.   Dyspareunia: Yes, longstanding, entry dyspareunia    Past GYN history:        Last PAP smear:  Normal    Patients records are available and reviewed at today's visit.    No past medical history on file.    Past Surgical History:   Procedure Laterality Date     c section times 2       COLONOSCOPY - HIM SCAN  2013    Colonoscopy~Irritable bowel with a repeat age 48, 2013     DILATION AND CURETTAGE SUCTION  2014    Procedure: DILATION AND CURETTAGE SUCTION;  Surgeon: Oliver Webb MD;  Location: [facility]     ENDOSCOPIC SINUS SURGERY N/A 2017    Procedure: ENDOSCOPIC SINUS SURGERY;  ENDOSCOPIC SINUS SURGERY, SEPTOPLASTY, TURBINATE REDUCTION, REPAIR LEFT NASAL VALVE;  Surgeon: Yulisa Quiroz MD;  Location: [facility]     ENT SURGERY      sinus     ESOPHAGOSCOPY,  GASTROSCOPY, DUODENOSCOPY (EGD), COMBINED N/A 1/8/2016    Procedure: COMBINED ESOPHAGOSCOPY, GASTROSCOPY, DUODENOSCOPY (EGD);  Surgeon: Francois Gordon MD;  Location: [facility]     GYN SURGERY  2000    d & c     SEPTOPLASTY, TURBINOPLASTY, COMBINED N/A 12/20/2017    Procedure: COMBINED SEPTOPLASTY, TURBINOPLASTY;;  Surgeon: Yulisa Quiroz MD;  Location: [facility]     wisdom teeth         Family History   Problem Relation Age of Onset     Hypertension Mother      High cholesterol Mother      Genitourinary Problems Mother         kidney stone     Hypertension Father      High cholesterol Father      Breast Cancer Maternal Grandmother        Current Outpatient Medications   Medication Sig Dispense Refill     albuterol (ALBUTEROL) 108 (90 BASE) MCG/ACT inhaler Inhale 1 puff into the lungs 3 times daily For 3-5 days. 1 Inhaler 0     budesonide (PULMICORT) 0.5 MG/2ML neb solution Spray 2 mLs (0.5 mg) in nostril 2 times daily Make 240 cc Pa med sinus irrigation Mix 2 ml vial of budesonide 0.5 mg Rinse  mL 1     budesonide-formoterol (SYMBICORT) 160-4.5 MCG/ACT Inhaler Inhale 2 puffs into the lungs 2 times daily       cetirizine (ZYRTEC) 10 MG tablet Take 1 tablet (10 mg) by mouth every evening 30 tablet 1     fluticasone (FLONASE) 50 MCG/ACT nasal spray Spray 2 sprays into both nostrils daily 16 g 11     loratadine (CLARITIN) 10 MG tablet TAKE 1 TABLET DAILY. 30 tablet 11     montelukast (SINGULAIR) 10 MG tablet TAKE ONE TABLET AT BEDTIME. 90 tablet 3     multivitamin w/minerals (THERA-VIT-M) tablet        pantoprazole sodium (PROTONIX) 40 MG packet Take 40 mg by mouth       vitamin D (ERGOCALCIFEROL) 82230 UNIT capsule Take 2,000 Units by mouth daily        azelastine-fluticasone (DYMISTA) 137-50 MCG/ACT nasal spray Spray 1 spray into both nostrils 2 times daily (Patient not taking: Reported on 1/27/2023) 2 Bottle 3     EPINEPHrine (EPIPEN) 0.3 MG/0.3ML injection Inject 0.3 mLs into the muscle once as needed for  "anaphylaxis for 1 dose. (Patient not taking: Reported on 2/3/2023) 2 each 3       Allergies: Ciprofloxacin and Seasonal allergies    ROS:  CONSTITUTIONAL: NEGATIVE for fever, chills, change in weight    GI: NEGATIVE for nausea, abdominal pain, heartburn, or change in bowel habits  : NEGATIVE for frequency, dysuria, hematuria, vaginal discharge  PSYCHIATRIC: NEGATIVE for changes in mood or affect except for PMS asx as noted    EXAM:  Blood pressure 103/64, pulse 71, height 1.689 m (5' 6.5\"), weight 68 kg (150 lb), last menstrual period 02/02/2023, SpO2 100 %, not currently breastfeeding.   BMI= Body mass index is 23.85 kg/m .  General - pleasant female in no acute distress.  Rectovaginal - deferred.  Musculoskeletal - no gross deformities or edema  Neurological - normal mental status.      US PELVIC TRANSABDOMINAL AND TRANSVAGINAL   FINDINGS:     The uterus measures 10.9 x 5.7 x 6.2 cm.     There are rounded masses within the uterus within or adjacent to the  endometrial canal. One or more of these may be submucosal or  intraluminal in location. The upper most mass is 2.3 x 1.7 cm in  diameter. The lowermost mass is 2.2 x 1.9 cm. Echogenicity of both is  similar, neck with isoechoic and hypoechoic components.     There is a small volume of free fluid in the endometrial canal.     The right ovary is 3.2 x 1.7 x 3.2 cm.      The left ovary is 4.0 x 2.1 x 1.5 cm.      Doppler flow is visible in both ovaries. There is no evidence of  torsion.     No adnexal masses or abnormal fluid collection is present                                                                      IMPRESSION: The findings are compatible with 2 submucosal fibroids  versus endometrial polyps. Both measure greater than 2 cm in diameter.    ASSESSMENT/PLAN:    Menorrhagia with anemia, submucosal fibroids vs polyps  Recommend D and C/hysteroscopy, possible polypectomy or submucosal fibroid resection.  Reviewed goals, risks, alternatives for planned " procedure.  Including risk of bleeding, infection, damage to nerves, blood vessels, bowel and bladder, uterine perforation. Discussed recovery period and expected discomfort.. All questions were answered.  Preoperative instructions discussed.  Pt desires to proceed.  Scheduled 3/8/23

## 2023-02-28 ENCOUNTER — ANESTHESIA EVENT (OUTPATIENT)
Dept: SURGERY | Facility: HOSPITAL | Age: 43
End: 2023-02-28
Payer: COMMERCIAL

## 2023-02-28 NOTE — ANESTHESIA PREPROCEDURE EVALUATION
Anesthesia Pre-Procedure Evaluation    Patient: Sydni Isabel   MRN: [id number] : 1980        Procedure : Procedure(s):  Hysteroscopy with Dialtion and Curettage, Possible Submucosal Fibroid Resection          No past medical history on file.   Past Surgical History:   Procedure Laterality Date     c section times 2       COLONOSCOPY - HIM SCAN  2013    Colonoscopy~Irritable bowel with a repeat age 48, 2013     DILATION AND CURETTAGE SUCTION  2014    Procedure: DILATION AND CURETTAGE SUCTION;  Surgeon: Oliver Webb MD;  Location: [facility]     ENDOSCOPIC SINUS SURGERY N/A 2017    Procedure: ENDOSCOPIC SINUS SURGERY;  ENDOSCOPIC SINUS SURGERY, SEPTOPLASTY, TURBINATE REDUCTION, REPAIR LEFT NASAL VALVE;  Surgeon: Yulisa Quiroz MD;  Location: [facility]     ENT SURGERY      sinus     ESOPHAGOSCOPY, GASTROSCOPY, DUODENOSCOPY (EGD), COMBINED N/A 2016    Procedure: COMBINED ESOPHAGOSCOPY, GASTROSCOPY, DUODENOSCOPY (EGD);  Surgeon: Francois Gordon MD;  Location: [facility]     GYN SURGERY      d & c     SEPTOPLASTY, TURBINOPLASTY, COMBINED N/A 2017    Procedure: COMBINED SEPTOPLASTY, TURBINOPLASTY;;  Surgeon: Yulisa Quiroz MD;  Location: [facility]     wisdom teeth        Allergies   Allergen Reactions     Ciprofloxacin Other (See Comments)     Leg pain     Seasonal Allergies Cough      Social History     Tobacco Use     Smoking status: Never     Smokeless tobacco: Never   Substance Use Topics     Alcohol use: No      Wt Readings from Last 1 Encounters:   23 68 kg (150 lb)        Anesthesia Evaluation   Pt has had prior anesthetic. Type: General.    History of anesthetic complications  - PONV.      ROS/MED HX  ENT/Pulmonary: Comment: Chronic sinuisitis  DNS    (+) allergic rhinitis, Intermittent, asthma (exercise induced; no issues for last 2 years)     Neurologic:  - neg neurologic ROS     Cardiovascular:     (+) -----Previous cardiac testing   Echo: Date: Results:    Stress  Test: Date: Results:    ECG Reviewed: Date: 1/2023 Results:  SR  Cath: Date: Results:      METS/Exercise Tolerance: >4 METS    Hematologic:     (+) anemia (hgb 11 1/2023),     Musculoskeletal: Comment: Cervical disc disorder  Back and neck hurt at [facility]      GI/Hepatic:     (+) GERD (none today ), Asymptomatic on medication,     Renal/Genitourinary:  - neg Renal ROS     Endo:  - neg endo ROS     Psychiatric/Substance Use:     (+) psychiatric history anxiety     Infectious Disease:  - neg infectious disease ROS     Malignancy:  - neg malignancy ROS     Other: Comment: History of eating disorder - neg other ROS          Physical Exam    Airway      Comment: No limitations with cervical disc history     Mallampati: I   TM distance: > 3 FB   Neck ROM: full   Mouth opening: > 3 cm    Respiratory Devices and Support         Dental     Comment: Missing teeth in posterior upper left     (+) Minor Abnormalities - some fillings, tiny chips      Cardiovascular          Rhythm and rate: regular and normal     Pulmonary           breath sounds clear to auscultation           OUTSIDE LABS:  CBC:   Lab Results   Component Value Date    WBC 6.9 01/14/2023    WBC 6.6 10/06/2017    HGB 11.0 (L) 01/14/2023    HGB 14.2 10/06/2017    HCT 36.0 01/14/2023    HCT 41.4 10/06/2017     01/14/2023     10/06/2017     BMP:   Lab Results   Component Value Date     01/14/2023     10/06/2017    POTASSIUM 3.8 01/14/2023    POTASSIUM 3.6 10/06/2017    CHLORIDE 104 01/14/2023    CHLORIDE 105 10/06/2017    CO2 25 01/14/2023    CO2 23 10/06/2017    BUN 19.2 01/14/2023    BUN 10 10/06/2017    CR 0.64 01/14/2023    CR 0.59 10/06/2017     (H) 01/14/2023    GLC 76 10/06/2017     COAGS:   Lab Results   Component Value Date    INR 1.07 11/13/2015     POC:   Lab Results   Component Value Date    HCG Negative 12/20/2017     HEPATIC:   Lab Results   Component Value Date    ALBUMIN 4.0 10/06/2017    PROTTOTAL 8.3 10/06/2017    ALT 18  10/06/2017    AST 15 10/06/2017    ALKPHOS 58 10/06/2017    BILITOTAL 0.9 10/06/2017     OTHER:   Lab Results   Component Value Date    PH 7.39 11/21/2017    JARETT 9.3 01/14/2023    MAG 2.0 08/27/2017    LIPASE 260 09/03/2017    AMYLASE 62 09/03/2017    TSH 2.42 01/14/2023    CRP 4.1 11/13/2015    SED 7 08/27/2017       Anesthesia Plan    ASA Status:  2   NPO Status:  NPO Appropriate (0530 oral preop drink )    Anesthesia Type: MAC.     - Reason for MAC: straight local not clinically adequate              Consents    Anesthesia Plan(s) and associated risks, benefits, and realistic alternatives discussed. Questions answered and patient/representative(s) expressed understanding.    - Discussed:     - Discussed with:  Patient      - Extended Intubation/Ventilatory Support Discussed: No.      - Patient is DNR/DNI Status: No    Use of blood products discussed: No .     Postoperative Care       PONV prophylaxis: Ondansetron (or other 5HT-3), Dexamethasone or Solumedrol, Aprepitant     Comments:    Other Comments: 2/23/23 OLIVIA no heart/lungs  HCG ordered    Fonda 3/6/23            CASSANDRA Paula CRNA

## 2023-03-06 ENCOUNTER — TRANSFERRED RECORDS (OUTPATIENT)
Dept: HEALTH INFORMATION MANAGEMENT | Facility: CLINIC | Age: 43
End: 2023-03-06

## 2023-03-06 LAB
ALT SERPL-CCNC: 19 U/L (ref 18–65)
AST SERPL-CCNC: 26 U/L (ref 10–30)
CREATININE (EXTERNAL): 0.65 MG/DL (ref 0.7–1.2)
GFR ESTIMATED (EXTERNAL): >90 ML/MIN/1.73M2
GLUCOSE (EXTERNAL): 87 MG/DL (ref 60–99)
POTASSIUM (EXTERNAL): 4.2 MMOL/L (ref 3.5–5.1)

## 2023-03-08 ENCOUNTER — APPOINTMENT (OUTPATIENT)
Dept: LAB | Facility: HOSPITAL | Age: 43
End: 2023-03-08
Attending: OBSTETRICS & GYNECOLOGY
Payer: COMMERCIAL

## 2023-03-08 ENCOUNTER — MYC MEDICAL ADVICE (OUTPATIENT)
Dept: OTOLARYNGOLOGY | Facility: OTHER | Age: 43
End: 2023-03-08

## 2023-03-08 ENCOUNTER — HOSPITAL ENCOUNTER (OUTPATIENT)
Facility: HOSPITAL | Age: 43
Discharge: HOME | End: 2023-03-08
Attending: OBSTETRICS & GYNECOLOGY | Admitting: OBSTETRICS & GYNECOLOGY
Payer: COMMERCIAL

## 2023-03-08 ENCOUNTER — ANESTHESIA (OUTPATIENT)
Dept: SURGERY | Facility: HOSPITAL | Age: 43
End: 2023-03-08
Payer: COMMERCIAL

## 2023-03-08 VITALS
HEART RATE: 60 BPM | DIASTOLIC BLOOD PRESSURE: 86 MMHG | HEIGHT: 67 IN | SYSTOLIC BLOOD PRESSURE: 119 MMHG | BODY MASS INDEX: 24.17 KG/M2 | OXYGEN SATURATION: 100 % | RESPIRATION RATE: 18 BRPM | WEIGHT: 154 LBS | TEMPERATURE: 97.4 F

## 2023-03-08 DIAGNOSIS — Z98.890 POST-OPERATIVE STATE: Primary | ICD-10-CM

## 2023-03-08 DIAGNOSIS — J30.2 SEASONAL ALLERGIC RHINITIS, UNSPECIFIED TRIGGER: ICD-10-CM

## 2023-03-08 DIAGNOSIS — J30.1 SEASONAL ALLERGIC RHINITIS DUE TO POLLEN: ICD-10-CM

## 2023-03-08 DIAGNOSIS — J31.0 CHRONIC RHINITIS: ICD-10-CM

## 2023-03-08 DIAGNOSIS — J30.89 PERENNIAL ALLERGIC RHINITIS WITH SEASONAL VARIATION: ICD-10-CM

## 2023-03-08 DIAGNOSIS — J30.2 PERENNIAL ALLERGIC RHINITIS WITH SEASONAL VARIATION: ICD-10-CM

## 2023-03-08 LAB — HCG UR QL: NEGATIVE

## 2023-03-08 PROCEDURE — 250N000013 HC RX MED GY IP 250 OP 250 PS 637: Performed by: OBSTETRICS & GYNECOLOGY

## 2023-03-08 PROCEDURE — 58558 HYSTEROSCOPY BIOPSY: CPT | Performed by: NURSE ANESTHETIST, CERTIFIED REGISTERED

## 2023-03-08 PROCEDURE — 710N000012 HC RECOVERY PHASE 2, PER MINUTE: Performed by: OBSTETRICS & GYNECOLOGY

## 2023-03-08 PROCEDURE — 250N000011 HC RX IP 250 OP 636: Performed by: OBSTETRICS & GYNECOLOGY

## 2023-03-08 PROCEDURE — 250N000009 HC RX 250: Performed by: OBSTETRICS & GYNECOLOGY

## 2023-03-08 PROCEDURE — 258N000003 HC RX IP 258 OP 636: Performed by: NURSE ANESTHETIST, CERTIFIED REGISTERED

## 2023-03-08 PROCEDURE — 250N000011 HC RX IP 250 OP 636: Performed by: NURSE ANESTHETIST, CERTIFIED REGISTERED

## 2023-03-08 PROCEDURE — 81025 URINE PREGNANCY TEST: CPT | Performed by: NURSE ANESTHETIST, CERTIFIED REGISTERED

## 2023-03-08 PROCEDURE — 88305 TISSUE EXAM BY PATHOLOGIST: CPT | Mod: 26 | Performed by: PATHOLOGY

## 2023-03-08 PROCEDURE — 88305 TISSUE EXAM BY PATHOLOGIST: CPT | Mod: TC | Performed by: OBSTETRICS & GYNECOLOGY

## 2023-03-08 PROCEDURE — 58558 HYSTEROSCOPY BIOPSY: CPT | Performed by: OBSTETRICS & GYNECOLOGY

## 2023-03-08 PROCEDURE — 360N000076 HC SURGERY LEVEL 3, PER MIN: Performed by: OBSTETRICS & GYNECOLOGY

## 2023-03-08 PROCEDURE — 272N000001 HC OR GENERAL SUPPLY STERILE: Performed by: OBSTETRICS & GYNECOLOGY

## 2023-03-08 PROCEDURE — 999N000141 HC STATISTIC PRE-PROCEDURE NURSING ASSESSMENT: Performed by: OBSTETRICS & GYNECOLOGY

## 2023-03-08 PROCEDURE — 370N000017 HC ANESTHESIA TECHNICAL FEE, PER MIN: Performed by: OBSTETRICS & GYNECOLOGY

## 2023-03-08 PROCEDURE — 250N000012 HC RX MED GY IP 250 OP 636 PS 637: Performed by: NURSE ANESTHETIST, CERTIFIED REGISTERED

## 2023-03-08 PROCEDURE — 250N000009 HC RX 250: Performed by: NURSE ANESTHETIST, CERTIFIED REGISTERED

## 2023-03-08 RX ORDER — EPINEPHRINE 0.3 MG/.3ML
0.3 INJECTION SUBCUTANEOUS PRN
Qty: 0.6 ML | Refills: 1 | Status: SHIPPED | OUTPATIENT
Start: 2023-03-08 | End: 2023-03-09

## 2023-03-08 RX ORDER — LIDOCAINE HYDROCHLORIDE 10 MG/ML
INJECTION, SOLUTION INFILTRATION; PERINEURAL PRN
Status: DISCONTINUED | OUTPATIENT
Start: 2023-03-08 | End: 2023-03-08 | Stop reason: HOSPADM

## 2023-03-08 RX ORDER — PROPOFOL 10 MG/ML
INJECTION, EMULSION INTRAVENOUS PRN
Status: DISCONTINUED | OUTPATIENT
Start: 2023-03-08 | End: 2023-03-08

## 2023-03-08 RX ORDER — LIDOCAINE HYDROCHLORIDE 20 MG/ML
INJECTION, SOLUTION INFILTRATION; PERINEURAL PRN
Status: DISCONTINUED | OUTPATIENT
Start: 2023-03-08 | End: 2023-03-08

## 2023-03-08 RX ORDER — LIDOCAINE HYDROCHLORIDE 10 MG/ML
INJECTION, SOLUTION EPIDURAL; INFILTRATION; INTRACAUDAL; PERINEURAL
Status: DISCONTINUED
Start: 2023-03-08 | End: 2023-03-08 | Stop reason: HOSPADM

## 2023-03-08 RX ORDER — ACETAMINOPHEN 325 MG/1
975 TABLET ORAL ONCE
Status: COMPLETED | OUTPATIENT
Start: 2023-03-08 | End: 2023-03-08

## 2023-03-08 RX ORDER — KETOROLAC TROMETHAMINE 30 MG/ML
30 INJECTION, SOLUTION INTRAMUSCULAR; INTRAVENOUS ONCE
Status: COMPLETED | OUTPATIENT
Start: 2023-03-08 | End: 2023-03-08

## 2023-03-08 RX ORDER — DEXAMETHASONE SODIUM PHOSPHATE 4 MG/ML
INJECTION, SOLUTION INTRA-ARTICULAR; INTRALESIONAL; INTRAMUSCULAR; INTRAVENOUS; SOFT TISSUE PRN
Status: DISCONTINUED | OUTPATIENT
Start: 2023-03-08 | End: 2023-03-08

## 2023-03-08 RX ORDER — ONDANSETRON 4 MG/1
4 TABLET, ORALLY DISINTEGRATING ORAL EVERY 6 HOURS PRN
Qty: 10 TABLET | Refills: 0 | Status: SHIPPED | OUTPATIENT
Start: 2023-03-08 | End: 2023-03-23

## 2023-03-08 RX ORDER — SODIUM CHLORIDE, SODIUM LACTATE, POTASSIUM CHLORIDE, CALCIUM CHLORIDE 600; 310; 30; 20 MG/100ML; MG/100ML; MG/100ML; MG/100ML
INJECTION, SOLUTION INTRAVENOUS CONTINUOUS
Status: DISCONTINUED | OUTPATIENT
Start: 2023-03-08 | End: 2023-03-08 | Stop reason: HOSPADM

## 2023-03-08 RX ORDER — MISOPROSTOL 200 UG/1
200 TABLET ORAL ONCE
Status: COMPLETED | OUTPATIENT
Start: 2023-03-08 | End: 2023-03-08

## 2023-03-08 RX ORDER — OXYCODONE HYDROCHLORIDE 5 MG/1
5 TABLET ORAL
Status: CANCELLED | OUTPATIENT
Start: 2023-03-08

## 2023-03-08 RX ORDER — APREPITANT 40 MG/1
40 CAPSULE ORAL ONCE
Status: COMPLETED | OUTPATIENT
Start: 2023-03-08 | End: 2023-03-08

## 2023-03-08 RX ORDER — IBUPROFEN 800 MG/1
800 TABLET, FILM COATED ORAL EVERY 8 HOURS PRN
Qty: 30 TABLET | Refills: 1 | Status: SHIPPED | OUTPATIENT
Start: 2023-03-08

## 2023-03-08 RX ORDER — ALBUTEROL SULFATE 90 UG/1
2 AEROSOL, METERED RESPIRATORY (INHALATION) EVERY 4 HOURS PRN
Qty: 18 G | Refills: 1 | Status: SHIPPED | OUTPATIENT
Start: 2023-03-08 | End: 2023-03-09

## 2023-03-08 RX ORDER — LIDOCAINE 40 MG/G
CREAM TOPICAL
Status: DISCONTINUED | OUTPATIENT
Start: 2023-03-08 | End: 2023-03-08 | Stop reason: HOSPADM

## 2023-03-08 RX ORDER — ONDANSETRON 2 MG/ML
INJECTION INTRAMUSCULAR; INTRAVENOUS PRN
Status: DISCONTINUED | OUTPATIENT
Start: 2023-03-08 | End: 2023-03-08

## 2023-03-08 RX ORDER — ONDANSETRON 4 MG/1
4 TABLET, ORALLY DISINTEGRATING ORAL
Status: CANCELLED | OUTPATIENT
Start: 2023-03-08

## 2023-03-08 RX ORDER — MONTELUKAST SODIUM 10 MG/1
TABLET ORAL
Qty: 90 TABLET | Refills: 3 | Status: SHIPPED | OUTPATIENT
Start: 2023-03-08 | End: 2023-03-09

## 2023-03-08 RX ADMIN — PROPOFOL 50 MG: 10 INJECTION, EMULSION INTRAVENOUS at 09:32

## 2023-03-08 RX ADMIN — PROPOFOL 50 MG: 10 INJECTION, EMULSION INTRAVENOUS at 09:03

## 2023-03-08 RX ADMIN — PROPOFOL 50 MG: 10 INJECTION, EMULSION INTRAVENOUS at 09:47

## 2023-03-08 RX ADMIN — PROPOFOL 50 MG: 10 INJECTION, EMULSION INTRAVENOUS at 09:27

## 2023-03-08 RX ADMIN — MIDAZOLAM HYDROCHLORIDE 1 MG: 1 INJECTION, SOLUTION INTRAMUSCULAR; INTRAVENOUS at 08:52

## 2023-03-08 RX ADMIN — PROPOFOL 50 MG: 10 INJECTION, EMULSION INTRAVENOUS at 09:12

## 2023-03-08 RX ADMIN — MISOPROSTOL 200 MCG: 200 TABLET ORAL at 08:32

## 2023-03-08 RX ADMIN — PROPOFOL 50 MG: 10 INJECTION, EMULSION INTRAVENOUS at 09:17

## 2023-03-08 RX ADMIN — PROPOFOL 50 MG: 10 INJECTION, EMULSION INTRAVENOUS at 09:57

## 2023-03-08 RX ADMIN — TRANEXAMIC ACID 1 G: 1 INJECTION, SOLUTION INTRAVENOUS at 09:27

## 2023-03-08 RX ADMIN — SODIUM CHLORIDE, POTASSIUM CHLORIDE, SODIUM LACTATE AND CALCIUM CHLORIDE: 600; 310; 30; 20 INJECTION, SOLUTION INTRAVENOUS at 08:46

## 2023-03-08 RX ADMIN — APREPITANT 40 MG: 40 CAPSULE ORAL at 08:33

## 2023-03-08 RX ADMIN — KETOROLAC TROMETHAMINE 30 MG: 30 INJECTION, SOLUTION INTRAMUSCULAR; INTRAVENOUS at 08:47

## 2023-03-08 RX ADMIN — MIDAZOLAM 2 MG: 1 INJECTION INTRAMUSCULAR; INTRAVENOUS at 09:00

## 2023-03-08 RX ADMIN — ONDANSETRON 4 MG: 2 INJECTION INTRAMUSCULAR; INTRAVENOUS at 09:05

## 2023-03-08 RX ADMIN — PROPOFOL 50 MG: 10 INJECTION, EMULSION INTRAVENOUS at 09:22

## 2023-03-08 RX ADMIN — ACETAMINOPHEN 975 MG: 325 TABLET ORAL at 08:32

## 2023-03-08 RX ADMIN — PROPOFOL 50 MG: 10 INJECTION, EMULSION INTRAVENOUS at 09:52

## 2023-03-08 RX ADMIN — LIDOCAINE HYDROCHLORIDE 40 MG: 20 INJECTION, SOLUTION INFILTRATION; PERINEURAL at 09:01

## 2023-03-08 RX ADMIN — SODIUM CHLORIDE, POTASSIUM CHLORIDE, SODIUM LACTATE AND CALCIUM CHLORIDE: 600; 310; 30; 20 INJECTION, SOLUTION INTRAVENOUS at 08:47

## 2023-03-08 RX ADMIN — PROPOFOL 50 MG: 10 INJECTION, EMULSION INTRAVENOUS at 09:07

## 2023-03-08 RX ADMIN — DEXAMETHASONE SODIUM PHOSPHATE 10 MG: 4 INJECTION, SOLUTION INTRA-ARTICULAR; INTRALESIONAL; INTRAMUSCULAR; INTRAVENOUS; SOFT TISSUE at 09:05

## 2023-03-08 RX ADMIN — PROPOFOL 50 MG: 10 INJECTION, EMULSION INTRAVENOUS at 09:37

## 2023-03-08 RX ADMIN — PROPOFOL 50 MG: 10 INJECTION, EMULSION INTRAVENOUS at 09:42

## 2023-03-08 ASSESSMENT — ACTIVITIES OF DAILY LIVING (ADL)
ADLS_ACUITY_SCORE: 35
ADLS_ACUITY_SCORE: 35

## 2023-03-08 NOTE — ANESTHESIA CARE TRANSFER NOTE
Patient: Sydni Isabel    Procedure: Procedure(s):  Hysteroscopy with Dilation and Curettage, Submucosal Fibroid Resection       Diagnosis: Menorrhagia [N92.0]  Fibroids, submucosal [D25.0]  Diagnosis Additional Information: No value filed.    Anesthesia Type:   MAC     Note:    Oropharynx: spontaneously breathing  Level of Consciousness: drowsy  Oxygen Supplementation: room air    Independent Airway: airway patency satisfactory and stable  Dentition: dentition unchanged  Vital Signs Stable: post-procedure vital signs reviewed and stable  Report to RN Given: handoff report given  Patient transferred to: Phase II    Handoff Report: Identifed the Patient, Identified the Reponsible Provider, Reviewed the pertinent medical history, Discussed the surgical course, Reviewed Intra-OP anesthesia mangement and issues during anesthesia, Set expectations for post-procedure period and Allowed opportunity for questions and acknowledgement of understanding      Vitals:  Vitals Value Taken Time   BP     Temp     Pulse     Resp     SpO2         Electronically Signed By: CASSANDRA Vicente CRNA  March 8, 2023  10:08 AM

## 2023-03-08 NOTE — DISCHARGE INSTRUCTIONS
Call MD prior to DC.  No driving today or while on pain medications  Pelvic rest for 2 weeks  Call Dr. Webb [phone] as necessary if problems  Schedule PO check 2 weeks Dr. Webb  No heavy lifting, vigorous activity,  exercise for 1 week      POST OPERATIVE PATIENT INFORMATION  After a D&C (Dilation and Curettage)    General Information:  You will be discharged when you are fully awake and are able to be driven home.  General anesthesia may reduce judgment, reaction time, and coordination for several hours after you seemingly have recovered.  Therefore, do not operate any motorized vehicle or power tools for 24 hours after discharge.  You should also note be alone for 12 hours after surgery.  A D&C (dilation and curettage) is a procedure where the opening of the uterus (cervix) is widened and the lining of the uterus is scraped, either to control abdominal uterine bleeding or to obtain tissue to study.  The following information should be helpful with self care at home.  Activity:  After arriving home, it is suggested that you rest or do quiet activities for the rest of the day.  The next day you may be as active as you feel able.  You may shower as desired.  After your surgery, you may find that you require more rest than usual the first 3-4 days as your body heals.  When you feel able, you may then return to work.    Discomfort/bleeding:    You may experience some cramping for 2-3 days after surgery, this is normal.  You may also continue to have vaginal bleeding or spotting for a week or less.  Do not douche, use tampons, tube bath or have intercourse until the bleeding stops or until your doctor says you may do so.  If saturating a pad an hour for two hours, notify your surgeon or call/return to the Emergency Room.    Precautions:  Even though you have no incision, special care should be taken to prevent infection and/or bleeding.  In addition to no douching, use of tampons, tub bath, intercourse, you  should always wipe from front to back after going to the bathroom to prevent contamination.  Call your doctor if your temperature goes above 100 F orally, if you experience heavy bleeding (saturating 2 pads/hour), have foul vaginal odor or discharge, or experience excess pain.  Diet:  Eat small amounts frequently after arriving home.  Begin with liquids such as ginger ale, cola, lemon-lime drinks, hot tea, Jell-O, popsicles, and clear soups in small amounts.  Gradually increase your diet to include other juices, creamed soups and solids as tolerated.  Avoid foods the day of surgery which are hard to digest such as heavy, sweet, highly spiced and/or fried foods.  Do not drink any alcoholic beverages for at least six hours following the surgery, as alcohol prolongs the effects of the anesthesia and can be dangerous.  Emesis (vomiting) sometimes occurs after anesthesia.  If emesis persists more than 5-7 times after arriving home, or if you have other difficulties, call your doctor.  Other:  Your doctor will want to see you in about 2-4 weeks.  Call the office as soon as possible for the appointment.  If you are uncertain about any of the above items or have any questions, feel free to ask us.  If you have difficulty after surgery and for some unforeseen reason are unable to contact your physician at his/her clinic, call the \[facility]\"" Emergency Room at [phone].               Post-Anesthesia Patient Instructions    IMMEDIATELY FOLLOWING SURGERY:  Do not drive or operate machinery for the first twenty four hours after surgery.  Do not make any important decisions for twenty four hours after surgery or while taking narcotic pain medications or sedatives.  If you develop intractable nausea and vomiting or a severe headache please notify your doctor immediately.    FOLLOW-UP:  Please make an appointment with your surgeon as instructed. You do not need to follow up with anesthesia unless specifically instructed to do  so.    WOUND CARE INSTRUCTIONS (if applicable):  Keep a dry clean dressing on the anesthesia/puncture wound site if there is drainage.  Once the wound has quit draining you may leave it open to air.  Generally you should leave the bandage intact for twenty four hours unless there is drainage.  If the epidural site drains for more than 36-48 hours please call the anesthesia department.    QUESTIONS?:  Please feel free to call your physician or the hospital  if you have any questions, and they will be happy to assist you.

## 2023-03-08 NOTE — ANESTHESIA POSTPROCEDURE EVALUATION
Patient: Sydni Isabel    Procedure: Procedure(s):  Hysteroscopy with Dilation and Curettage, Submucosal Fibroid Resection       Anesthesia Type:  MAC    Note:  Disposition: Outpatient   Postop Pain Control: Uneventful            Sign Out: Well controlled pain   PONV: No   Neuro/Psych: Uneventful            Sign Out: Acceptable/Baseline neuro status   Airway/Respiratory: Uneventful            Sign Out: Acceptable/Baseline resp. status   CV/Hemodynamics: Uneventful            Sign Out: Acceptable CV status; No obvious hypovolemia; No obvious fluid overload   Other NRE: NONE   DID A NON-ROUTINE EVENT OCCUR? No           Last vitals:  Vitals Value Taken Time   /90 03/08/23 1035   Temp 97.4  F (36.3  C) 03/08/23 1030   Pulse 57 03/08/23 1035   Resp 18 03/08/23 1030   SpO2 100 % 03/08/23 1036   Vitals shown include unvalidated device data.    Electronically Signed By: CASSANDRA Vicente CRNA  March 8, 2023  10:37 AM

## 2023-03-08 NOTE — OP NOTE
[facility]  Operative Note    Pre-operative diagnosis: Menorrhagia [N92.0]  Fibroids, submucosal [D25.0]   Post-operative diagnosis Same, Pathology pending   Procedure: Dilatation and curettage, Hysteroscopy, submucosal fibroid resection (partial)   Surgeon:  Assistant: Oliver Webb MD  None   Anesthesia: MAC(Monitored IV sedation), Local cervical/paracervical block      COMPLICATIONS: None.   EBL:  Minimal  SPECIMENS:   Endometrial currettings  OPERATIVE FINDINGS:  The uterus sounded to 9 cm. There were 2 large sub mucosal fibroids on fundal 1.5 cm and one posterior 3 cm diameter both intracavitary.  Both tubal ostia were identified with otherwise normally contoured endometrial cavity.  OPERATIVE DICTATION: The patient was brought to the operating room and uneventfully placed under IV sedation.  She was prepped and draped in the dorsal lithotomy position and her bladder drained. The cervix was visualized with a weighted speculum and grasped anteriorly with a fine-toothed tenaculum. A cervical/paracervical block was performed with 1% lidocaine.   The cervix was gently dilated with Hegar dilators.  Hysteroscopy was performed using a 30-degree rigid hysteroscope and normal saline distention media. Visualization was excellent. Findings were as described above.  The hysteroscope was removed.  Sharp curettage was performed in all 4 quadrants and endometrial curettings sent to pathology for review.   Hysteroscopy was re-performed with Glycine as distension media.  Accurate I/O's were monitored.  The resectoscope was advanced and using 220 wats pure cutting cautery the fundal fibroid was able to be resected.  The larger posterior fibroid was too large to be resected hysteroscopically without risking complications (prolongued surgery and fluid overload) so the procedure was terminated.  The instruments were removed. There were no complications and the patient was transferred to the recovery room in  excellent stable condition. Glycine fluid deficit measured at 250ml.       Oliver Webb MD  3/8/2023  10:09 AM

## 2023-03-08 NOTE — TELEPHONE ENCOUNTER
Albuterol  Last Written Prescription Date: 9/23/15  Last Fill Quantity: 1 inh # of Refills: 0  Last Office Visit: 1/27/23    Singulair  Last Written Prescription Date: 6/14/19  Last Fill Quantity: 90 # of Refills: 3  Last Office Visit: 1/27/23    Epipen  Last Written Prescription Date: 4/12/13  Last Fill Quantity: 2 # of Refills: 3  Last Office Visit: 1/27/23

## 2023-03-08 NOTE — OR NURSING
Patient and responsible adult given discharge instructions with no questions regarding instructions. Josiah score 20/20. Pain level 0/10.  Discharged from unit via ambulatory. Patient discharged to home with mom Bárbara.

## 2023-03-09 RX ORDER — ALBUTEROL SULFATE 90 UG/1
2 AEROSOL, METERED RESPIRATORY (INHALATION) EVERY 4 HOURS PRN
Qty: 18 G | Refills: 1 | Status: SHIPPED | OUTPATIENT
Start: 2023-03-09 | End: 2023-12-08

## 2023-03-09 RX ORDER — MONTELUKAST SODIUM 10 MG/1
TABLET ORAL
Qty: 90 TABLET | Refills: 3 | Status: SHIPPED | OUTPATIENT
Start: 2023-03-09

## 2023-03-09 RX ORDER — EPINEPHRINE 0.3 MG/.3ML
0.3 INJECTION SUBCUTANEOUS PRN
Qty: 0.6 ML | Refills: 1 | Status: SHIPPED | OUTPATIENT
Start: 2023-03-09

## 2023-03-10 LAB
PATH REPORT.COMMENTS IMP SPEC: NORMAL
PATH REPORT.FINAL DX SPEC: NORMAL
PATH REPORT.GROSS SPEC: NORMAL
PATH REPORT.MICROSCOPIC SPEC OTHER STN: NORMAL
PATH REPORT.RELEVANT HX SPEC: NORMAL
PHOTO IMAGE: NORMAL

## 2023-03-13 ENCOUNTER — NURSE TRIAGE (OUTPATIENT)
Dept: OBGYN | Facility: OTHER | Age: 43
End: 2023-03-13

## 2023-03-14 NOTE — TELEPHONE ENCOUNTER
3/14/2023 1:26 PM  Second attempt made to contact pt. Left VM provided call back number.  Brina Quintero RN

## 2023-03-22 ENCOUNTER — TELEPHONE (OUTPATIENT)
Dept: ALLERGY | Facility: OTHER | Age: 43
End: 2023-03-22

## 2023-03-22 DIAGNOSIS — J32.0 CHRONIC MAXILLARY SINUSITIS: ICD-10-CM

## 2023-03-22 DIAGNOSIS — J30.2 PERENNIAL ALLERGIC RHINITIS WITH SEASONAL VARIATION: Primary | ICD-10-CM

## 2023-03-22 DIAGNOSIS — J30.9 ALLERGIC RHINITIS: ICD-10-CM

## 2023-03-22 DIAGNOSIS — J30.89 PERENNIAL ALLERGIC RHINITIS WITH SEASONAL VARIATION: Primary | ICD-10-CM

## 2023-03-22 NOTE — TELEPHONE ENCOUNTER
Order for Loratadine 10mg chewable tab to be administered after allergy testing.  Order pended for review and signature.    Kenroy Parks RN on 3/22/2023 at 4:42 PM

## 2023-03-23 ENCOUNTER — OFFICE VISIT (OUTPATIENT)
Dept: OBGYN | Facility: OTHER | Age: 43
End: 2023-03-23
Attending: OBSTETRICS & GYNECOLOGY
Payer: COMMERCIAL

## 2023-03-23 VITALS
WEIGHT: 154 LBS | SYSTOLIC BLOOD PRESSURE: 110 MMHG | DIASTOLIC BLOOD PRESSURE: 68 MMHG | BODY MASS INDEX: 24.17 KG/M2 | HEIGHT: 67 IN | OXYGEN SATURATION: 100 % | RESPIRATION RATE: 12 BRPM | HEART RATE: 69 BPM

## 2023-03-23 DIAGNOSIS — N92.1 MENORRHAGIA WITH IRREGULAR CYCLE: Primary | ICD-10-CM

## 2023-03-23 DIAGNOSIS — D50.0 IRON DEFICIENCY ANEMIA DUE TO CHRONIC BLOOD LOSS: ICD-10-CM

## 2023-03-23 DIAGNOSIS — D25.0 SUBMUCOUS LEIOMYOMA OF UTERUS: ICD-10-CM

## 2023-03-23 PROCEDURE — 99024 POSTOP FOLLOW-UP VISIT: CPT | Performed by: OBSTETRICS & GYNECOLOGY

## 2023-03-23 ASSESSMENT — PAIN SCALES - GENERAL: PAINLEVEL: SEVERE PAIN (7)

## 2023-03-23 NOTE — PROGRESS NOTES
"IRMA Isabel is a 42 year old female presents for post operative check. She is  2  week(s) status post hysteroscopy and partial submucosal fibroid resection.  She reports doing well and denies significant pain or bleeding.  Bowel and bladder function is satisfactory.  Significant findings:  Benign.  Two submucosal fibroids with the larger one unable to be resected hysteroscopically.     O.  Blood pressure 110/68, pulse 69, resp. rate 12, height 1.689 m (5' 6.5\"), weight 69.9 kg (154 lb), last menstrual period 02/02/2023, SpO2 100 %, not currently breastfeeding.    Abd: soft, non-tender, non-distended. Incision clear, dry, and intact without evidence of infection.    A. /P. Satisfactory post-op check.  AUB (menorrhagia with anemia)/dysmenorrhea, submucosal fibroids.  Management options discussed, NSAID'S, hormonal and surgical. Recommend laparoscopic supracervical hysterectomy, bilateral salpingectomy.  Reviewed goals, risks, alternatives for planned procedure.  Including risk of bleeding, infection, damage to nerves, blood vessels, bowel and bladder. Discussed recovery period and expected discomfort.. All questions were answered.  She has my card and phone number and will call to schedule.       Oliver Webb MD  "

## 2023-03-24 ENCOUNTER — OFFICE VISIT (OUTPATIENT)
Dept: ALLERGY | Facility: OTHER | Age: 43
End: 2023-03-24
Attending: PHYSICIAN ASSISTANT
Payer: COMMERCIAL

## 2023-03-24 ENCOUNTER — OFFICE VISIT (OUTPATIENT)
Dept: OTOLARYNGOLOGY | Facility: OTHER | Age: 43
End: 2023-03-24
Attending: PHYSICIAN ASSISTANT
Payer: COMMERCIAL

## 2023-03-24 VITALS
OXYGEN SATURATION: 100 % | DIASTOLIC BLOOD PRESSURE: 60 MMHG | BODY MASS INDEX: 24.75 KG/M2 | HEIGHT: 66 IN | SYSTOLIC BLOOD PRESSURE: 103 MMHG | HEART RATE: 80 BPM | WEIGHT: 154 LBS | TEMPERATURE: 98.6 F

## 2023-03-24 DIAGNOSIS — J30.2 PERENNIAL ALLERGIC RHINITIS WITH SEASONAL VARIATION: Primary | ICD-10-CM

## 2023-03-24 DIAGNOSIS — J30.89 PERENNIAL ALLERGIC RHINITIS WITH SEASONAL VARIATION: Primary | ICD-10-CM

## 2023-03-24 DIAGNOSIS — J30.89 ALLERGIC RHINITIS DUE TO DUST: ICD-10-CM

## 2023-03-24 DIAGNOSIS — J30.9 ALLERGIC RHINITIS: Primary | ICD-10-CM

## 2023-03-24 DIAGNOSIS — J31.0 CHRONIC RHINITIS: ICD-10-CM

## 2023-03-24 DIAGNOSIS — Z91.018 FOOD ALLERGY: ICD-10-CM

## 2023-03-24 PROCEDURE — 36415 COLL VENOUS BLD VENIPUNCTURE: CPT | Performed by: PHYSICIAN ASSISTANT

## 2023-03-24 PROCEDURE — 250N000013 HC RX MED GY IP 250 OP 250 PS 637: Performed by: PHYSICIAN ASSISTANT

## 2023-03-24 PROCEDURE — 86003 ALLG SPEC IGE CRUDE XTRC EA: CPT | Mod: 90 | Performed by: PHYSICIAN ASSISTANT

## 2023-03-24 PROCEDURE — 95024 IQ TESTS W/ALLERGENIC XTRCS: CPT

## 2023-03-24 PROCEDURE — 95004 PERQ TESTS W/ALRGNC XTRCS: CPT

## 2023-03-24 PROCEDURE — 99213 OFFICE O/P EST LOW 20 MIN: CPT | Performed by: PHYSICIAN ASSISTANT

## 2023-03-24 RX ADMIN — LORATADINE 10 MG: 5 TABLET, CHEWABLE ORAL at 10:12

## 2023-03-24 ASSESSMENT — ASTHMA QUESTIONNAIRES
QUESTION_2 LAST FOUR WEEKS HOW OFTEN HAVE YOU HAD SHORTNESS OF BREATH: ONCE OR TWICE A WEEK
QUESTION_1 LAST FOUR WEEKS HOW MUCH OF THE TIME DID YOUR ASTHMA KEEP YOU FROM GETTING AS MUCH DONE AT WORK, SCHOOL OR AT HOME: A LITTLE OF THE TIME
QUESTION_5 LAST FOUR WEEKS HOW WOULD YOU RATE YOUR ASTHMA CONTROL: WELL CONTROLLED
ACT_TOTALSCORE: 21
ACT_TOTALSCORE: 21
QUESTION_3 LAST FOUR WEEKS HOW OFTEN DID YOUR ASTHMA SYMPTOMS (WHEEZING, COUGHING, SHORTNESS OF BREATH, CHEST TIGHTNESS OR PAIN) WAKE YOU UP AT NIGHT OR EARLIER THAN USUAL IN THE MORNING: NOT AT ALL
QUESTION_4 LAST FOUR WEEKS HOW OFTEN HAVE YOU USED YOUR RESCUE INHALER OR NEBULIZER MEDICATION (SUCH AS ALBUTEROL): ONCE A WEEK OR LESS

## 2023-03-24 ASSESSMENT — PAIN SCALES - GENERAL: PAINLEVEL: MILD PAIN (2)

## 2023-03-24 NOTE — PROGRESS NOTES
Sydni was seen for allergy skin testing. Patient was seen by this nurse in conjunction with ENT provider. All encounter details are documented in ENT Provider's appointment from this same date. Please see referenced encounter for this visits documentation.     Kenroy Parks RN on 3/24/2023 at 10:11 AM

## 2023-03-24 NOTE — LETTER
3/24/2023         RE: Sydni Isabel  [address] [facility] [address]        Dear Colleague,    Thank you for referring your patient, Sydni Isabel, to the [facility]. Please see a copy of my visit note below.        Patient returns to ENT for MQT    MQT- 3/24/23  Dilution #6- Dust  Dilution #5-none  Dilution #2- ragweed, thistle, elm, pine, cottonwood, walnut, molds, cat, dog.       Increase in sneezing, chest/ throat tightness, sinus concerns.   She has been on Claritin or Zyrtec daily.   Using Flonase PRN seasonal changes.   Singulair PRN seasonal changes.   Symbicort PRN.  Rinses/ Nasal saline PRN.      MQT- 8/5/16  Dilution #6: Dust  Dilution #5: Grass, Cat, dog  Dilution #2: Trees, molds, molds  Hx of perennial allergic rhinitis with seasonal exacerbation   last note reviewed: using rosalba med, Flonase, claritin and singulari  She was on Red vial in 12/2018. She did not complete full course of SCIT.        Resides in a house with a basement  There is no water or mold  Carpet in bedroom - No.   Heat in home- Forced air, gas.   Animals- cat.   Family hx of allergies- Yes  Past trials of medications Claritin, Zyrtec, Xyzal, Allegra.         12/20/17  PROCEDURES PERFORMED:   1.  Bilateral endoscopic frontal sinusotomy  2.  Bilateral total ethmoidectomy  3.  Bilateral maxillary antrostomy with tissue removal   4.  Septoplasty with cartilage reinsertion  5.  Repair left nasal valve using septal cartilage graft  6.  Bilateral submucosal inferior turbinate reduction    Sinus procedures above performed using [employer]  navigation.     SURGEON: Yulisa Quiroz D.O.  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     Allergies  "  Allergen Reactions     Ciprofloxacin Other (See Comments)     Leg pain     Seasonal Allergies Cough     Current Outpatient Medications   Medication     albuterol (PROAIR HFA) 108 (90 Base) MCG/ACT inhaler     azelastine-fluticasone (DYMISTA) 137-50 MCG/ACT nasal spray     budesonide (PULMICORT) 0.5 MG/2ML neb solution     budesonide-formoterol (SYMBICORT) 160-4.5 MCG/ACT Inhaler     cetirizine (ZYRTEC) 10 MG tablet     EPINEPHrine (ANY BX GENERIC EQUIV) 0.3 MG/0.3ML injection 2-pack     fluticasone (FLONASE) 50 MCG/ACT nasal spray     ibuprofen (ADVIL/MOTRIN) 800 MG tablet     loratadine (CLARITIN) 10 MG tablet     montelukast (SINGULAIR) 10 MG tablet     multivitamin w/minerals (THERA-VIT-M) tablet     pantoprazole sodium (PROTONIX) 40 MG packet     vitamin D (ERGOCALCIFEROL) 66090 UNIT capsule     Current Facility-Administered Medications   Medication     [START ON 3/31/2023] loratadine (CLARITIN) chewable tablet 10 mg     ROS- SEE HPI  /60 (BP Location: Right arm, Patient Position: Sitting, Cuff Size: Adult Regular)   Pulse 80   Temp 98.6  F (37  C) (Tympanic)   Ht 1.676 m (5' 6\")   Wt 69.9 kg (154 lb)   LMP 02/02/2023 (Exact Date)   SpO2 100%   BMI 24.86 kg/m      General - The patient is well nourished and well developed, and appears to have good nutritional status.  Alert and oriented to person and place, interactive.  Head and Face - Normocephalic and atraumatic, with no gross asymmetry noted of the contour of the facial features.  The facial nerve is intact, with strong symmetric movements.  Neck-no palpable lymphadenopathy or thyroid mass.  Trachea is midline.  Eyes - Extraocular movements intact.        ASSESSMENT/ PLAN:       ICD-10-CM    1. Perennial allergic rhinitis with seasonal variation  J30.89     J30.2       2. Chronic rhinitis  J31.0       3. Allergic rhinitis due to dust  J30.89       4. Food allergy  Z91.018           Start Singulair daily.   Use Albuterol inhaler as needed. "   Work on allergy avoidance.     Complete Food panel. Results take about 10-14 days.   Consider allergy injections.     Martina Dominguez PA-C  ENT  [facility], [place]          Again, thank you for allowing me to participate in the care of your patient.        Sincerely,        Martina Dominguez PA-C

## 2023-03-24 NOTE — NURSING NOTE
This patient presents today for allergy skin testing.      Symptoms have included nasal congestion, PND,  sneezing, chest/throat tightness, sinus issues, itchy eyes, dizziness and ear fullness.  Symptoms are year round.  She has tried many different antihistamines, currently alternating between the Zyrtec and Claritin.  The meds do seem to help improve her symptoms.  She only uses the Budesonide rinses intermittently.  She takes Singulair intermittently and it does help her asthma symptoms.  She used to get sinus infections once per year, until her sinus surgeries, and she hasn't had one since.  She has had two previous sinus surgeries, one by  many years ago (2003), and again with  around 2014.  No h/o COM or surgeries as an adult, she did have BTT as a child.  Her skin is sensitive to lotions and she occasionally has some skin rashes.  She has exercised induced asthma and scored 21 on ACT.  No h/o strep/tonsillitis or surgeries.    This patient lives in a single family home, with a basement.   The home is in the [facility] setting and built in 1978. No mold, water or moisture issues in the home.  No carpet in the home, or bedroom.  Home has radiator heat and a window air conditioning unit.    This patient has 1 cat and 3 birds I the home.  The cat doesn't sleep with her.    This patient has had allergy testing in the past twice in 2013 and 2016.  She was on allergy shots and switched to allergy drops.    MQT- 04/12/13  Dilution #6: none  Dilution #5: Mugwart, Kochia, Oak, Joey,Hormodendrum, Helminthosporum. Penicillium, Rhizopus, Cat, Dog, and Cockroach  Dilution #2: Ragweed, Pigweed, Russian Thistle, Grass, Birch , Maple, Elm, Oak, Joey, Pine, Easter [place]. Red [place],  Epicoccum, Mucor, Grain and Grass Smut, Feather and Dust     MQT- 8/5/16  Dilution #6: Dust  Dilution #5: Grass, Cat, dog  Dilution #2: Trees, molds, molds  She was on Red vial in 12/2018. She did not complete full course of  SCIT.       This patient's medications have been reviewed prior to testing and all appropriate medications have been stopped.    Consent is signed by patient and signature is verified.     MQT/ID test is performed per protocol.  The patient tolerated testing well.  Benadryl gel used for post test itch per protocol.  Chewable Claritin 10 mg given for post test itch.  All findings are recorded on the paper flow sheet. Results are reviewed with this patient.  They are given written information regarding allergy.       The patient will follow-up with Martina Dominguez PA-C for treatment plan.      Kenroy Parks RN on 3/24/2023 at 10:10 AM

## 2023-03-24 NOTE — PATIENT INSTRUCTIONS
Start Singulair daily.   Use Albuterol inhaler as needed.   Work on allergy avoidance.     Complete Food panel. Results take about 10-14 days.   Consider allergy injections.       Thank you for allowing Martina Dominguez PA-C and our ENT team to participate in your care.  If your medications are too expensive, please give the nurse a call.  We can possibly change this medication.  If you have a scheduling or an appointment question please contact our Health Unit Coordinator at [phone], Ext. [phone].    ALL nursing questions or concerns can be directed to your ENT nurse at: [phone] Michelle

## 2023-03-24 NOTE — PROGRESS NOTES
Patient returns to ENT for MQT    MQT- 3/24/23  Dilution #6- Dust  Dilution #5-none  Dilution #2- ragweed, thistle, elm, pine, cottonwood, walnut, molds, cat, dog.       Increase in sneezing, chest/ throat tightness, sinus concerns.   She has been on Claritin or Zyrtec daily.   Using Flonase PRN seasonal changes.   Singulair PRN seasonal changes.   Symbicort PRN.  Rinses/ Nasal saline PRN.      MQT- 8/5/16  Dilution #6: Dust  Dilution #5: Grass, Cat, dog  Dilution #2: Trees, molds, molds  Hx of perennial allergic rhinitis with seasonal exacerbation   last note reviewed: using rosalba med, Flonase, claritin and singulari  She was on Red vial in 12/2018. She did not complete full course of SCIT.        Resides in a house with a basement  There is no water or mold  Carpet in bedroom - No.   Heat in home- Forced air, gas.   Animals- cat.   Family hx of allergies- Yes  Past trials of medications Claritin, Zyrtec, Xyzal, Allegra.         12/20/17  PROCEDURES PERFORMED:   1.  Bilateral endoscopic frontal sinusotomy  2.  Bilateral total ethmoidectomy  3.  Bilateral maxillary antrostomy with tissue removal   4.  Septoplasty with cartilage reinsertion  5.  Repair left nasal valve using septal cartilage graft  6.  Bilateral submucosal inferior turbinate reduction    Sinus procedures above performed using [employer]  navigation.     SURGEON: Yulisa uQiroz D.O.  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     Allergies   Allergen Reactions     Ciprofloxacin Other (See Comments)     Leg pain     Seasonal Allergies Cough     Current Outpatient Medications   Medication     albuterol (PROAIR HFA) 108 (90 Base) MCG/ACT inhaler     azelastine-fluticasone (DYMISTA) 137-50  "MCG/ACT nasal spray     budesonide (PULMICORT) 0.5 MG/2ML neb solution     budesonide-formoterol (SYMBICORT) 160-4.5 MCG/ACT Inhaler     cetirizine (ZYRTEC) 10 MG tablet     EPINEPHrine (ANY BX GENERIC EQUIV) 0.3 MG/0.3ML injection 2-pack     fluticasone (FLONASE) 50 MCG/ACT nasal spray     ibuprofen (ADVIL/MOTRIN) 800 MG tablet     loratadine (CLARITIN) 10 MG tablet     montelukast (SINGULAIR) 10 MG tablet     multivitamin w/minerals (THERA-VIT-M) tablet     pantoprazole sodium (PROTONIX) 40 MG packet     vitamin D (ERGOCALCIFEROL) 70376 UNIT capsule     Current Facility-Administered Medications   Medication     [START ON 3/31/2023] loratadine (CLARITIN) chewable tablet 10 mg     ROS- SEE HPI  /60 (BP Location: Right arm, Patient Position: Sitting, Cuff Size: Adult Regular)   Pulse 80   Temp 98.6  F (37  C) (Tympanic)   Ht 1.676 m (5' 6\")   Wt 69.9 kg (154 lb)   LMP 02/02/2023 (Exact Date)   SpO2 100%   BMI 24.86 kg/m      General - The patient is well nourished and well developed, and appears to have good nutritional status.  Alert and oriented to person and place, interactive.  Head and Face - Normocephalic and atraumatic, with no gross asymmetry noted of the contour of the facial features.  The facial nerve is intact, with strong symmetric movements.  Neck-no palpable lymphadenopathy or thyroid mass.  Trachea is midline.  Eyes - Extraocular movements intact.        ASSESSMENT/ PLAN:       ICD-10-CM    1. Perennial allergic rhinitis with seasonal variation  J30.89     J30.2       2. Chronic rhinitis  J31.0       3. Allergic rhinitis due to dust  J30.89       4. Food allergy  Z91.018           Start Singulair daily.   Use Albuterol inhaler as needed.   Work on allergy avoidance.     Complete Food panel. Results take about 10-14 days.   Consider allergy injections.     Martina Dominguez PA-C  ENT  [facility], [place]      "

## 2023-04-03 LAB — SCANNED LAB RESULT: NORMAL

## 2023-04-13 ENCOUNTER — OFFICE VISIT (OUTPATIENT)
Dept: CHIROPRACTIC MEDICINE | Facility: OTHER | Age: 43
End: 2023-04-13
Attending: CHIROPRACTOR
Payer: COMMERCIAL

## 2023-04-13 DIAGNOSIS — M54.50 ACUTE BILATERAL LOW BACK PAIN WITHOUT SCIATICA: ICD-10-CM

## 2023-04-13 DIAGNOSIS — M99.02 SEGMENTAL AND SOMATIC DYSFUNCTION OF THORACIC REGION: ICD-10-CM

## 2023-04-13 DIAGNOSIS — M99.03 SEGMENTAL AND SOMATIC DYSFUNCTION OF LUMBAR REGION: Primary | ICD-10-CM

## 2023-04-13 PROCEDURE — 99202 OFFICE O/P NEW SF 15 MIN: CPT | Mod: 25 | Performed by: CHIROPRACTOR

## 2023-04-13 PROCEDURE — 98940 CHIROPRACT MANJ 1-2 REGIONS: CPT | Mod: AT | Performed by: CHIROPRACTOR

## 2023-04-17 NOTE — PROGRESS NOTES
Subjective Finding:    Chief compalint: Patient presents with:  Back Pain: Low back and hip pains    , Pain Scale: 7/10, Intensity: sharp, Duration: 1 week, Radiating: no.    Date of injury:     Activities that the pain restricts:   Home/household/hobbies/social activities: no.  Work duties: no.  Sleep: no.  Makes symptoms better: rest.  Makes symptoms worse: activity, cervical extension and cervical flexion.  Have you seen anyone else for the symptoms? no.  Work related: no.  Automobile related injury: no.    Objective and Assessment:    Posture Analysis:   High shoulder: .  Head tilt: .  High iliac crest: .  Head carriage: forward.  Thoracic Kyphosis: neutral.  Lumbar Lordosis: neutral.    Lumbar Range of Motion: .ROM DEC  Cervical Range of Motion: .  Thoracic Range of Motion: left lateral flexion decreased.  Extremity Range of Motion: .    Palpation:   L spine tightness        Segmental dysfunction pre-treatment and treatment area: T567    L5    Assessment post-treatment:  Cervical: ROM increased.  Thoracic: ROM increased.  Lumbar: ROM increased.    Comments: .      Complicating Factors: .    Plan / Procedure:    32160  C MT   C  T  L spine as indicated above     Treatment plan: prn.  Instructed patient: stretch as instructed at visit.  Short term goals: increase ROM.  Long term goals: restore normal function.  Prognosis: very good.

## 2023-04-20 ENCOUNTER — MYC MEDICAL ADVICE (OUTPATIENT)
Dept: OBGYN | Facility: OTHER | Age: 43
End: 2023-04-20

## 2023-04-21 NOTE — TELEPHONE ENCOUNTER
Has anything else been going on (new medications, illness, stress etc).  We could have you come in and check some hormone levels to make sure nothing is off.  I assume pregnancy still not a possibility.  If you don't get  In next week or so we can also give you a medication to start your period.   Sometime people just have an off cycle.    Let me know if you want to come in to do  Labs or if you don't get period in next week .      If she can order HCG (qual), FSH, prolactin

## 2023-04-29 ENCOUNTER — HEALTH MAINTENANCE LETTER (OUTPATIENT)
Age: 43
End: 2023-04-29

## 2023-07-12 NOTE — PROGRESS NOTES
Allergy serum is mixed today at blue schedule 3 of 4, into one (5 ml) multi dose vial/vials.    Allergens included were:    Ragweed  0.2 ml of dilution # 0  Pigweed  0.2 ml of dilution # 0  Mugwort 0.2 ml of dilution # 0  Kochia  0.2 ml of dilution # 0  Russian Thistle 0.2 ml of dilution # 1  Viraj Grass 0.2 ml of dilution # 1  Birch mix 0.2 ml of dilution # 1  Maple Mix 0.2 of dilution # 1  Elm Mix 0.2 ml of dilution # 1  Oak Mix 0.2 ml of dilution # 1  Joey Mix 0.2 ml of dilution # 1  Pine Mix 0.2 ml of dilution # 1  [place] 0.2 ml of dilution # 0  Black [place] 0.2 ml of dilution # 0  Aspen 0.2 ml of dilution # 0  Red [place] 0.2 ml of dilution # 0    Alternaria 0.2 ml of dilution # 1  Aspergillus 0.2 ml of dilution # 0  Hormodendrum 0.2 ml of dilution # 0  Helminthosporium 0.2 ml of dilution # 0  Penicillium 0.2 ml of dilution # 1  Epicoccum 0.2 ml of dilution # 0  Fusarium 0.2 ml of dilution # 0  Mucor 0.2 ml of dilution # 0  Grain Smut 0.2 ml of dilution # 0  Grass Smut 0.2 ml of dilution # 0  Cat 0.2 ml of dilution # 1  Dog 0.2 ml of dilution # 1  Feather Mix 0.2 ml of dilution # 0  Dust Mite Mix 0.2 ml of dilution # 2  Horse 0.2 ml of dilution # 0    Apoorva Ryan RN     Pt presented in clinic for BP check    Last bp on 4-19-23 @ 172/84     1st reading today @ 158/88 P86    Allowed pt to sit for a few minutes    2nd reading @ 146/84 P 81    Discussed with NP    Per NP pt is to schedule follow up visit in Oct and keep track of bp readings at home.    Pt verbalized understanding and scheduled appointment 10-4-23

## 2023-11-02 ENCOUNTER — OFFICE VISIT (OUTPATIENT)
Dept: CHIROPRACTIC MEDICINE | Facility: OTHER | Age: 43
End: 2023-11-02
Attending: CHIROPRACTOR
Payer: COMMERCIAL

## 2023-11-02 DIAGNOSIS — M99.01 SEGMENTAL AND SOMATIC DYSFUNCTION OF CERVICAL REGION: Primary | ICD-10-CM

## 2023-11-02 DIAGNOSIS — M54.2 CERVICALGIA: ICD-10-CM

## 2023-11-02 DIAGNOSIS — M99.02 SEGMENTAL AND SOMATIC DYSFUNCTION OF THORACIC REGION: ICD-10-CM

## 2023-11-02 DIAGNOSIS — M99.03 SEGMENTAL AND SOMATIC DYSFUNCTION OF LUMBAR REGION: ICD-10-CM

## 2023-11-02 PROCEDURE — 98941 CHIROPRACT MANJ 3-4 REGIONS: CPT | Mod: AT | Performed by: CHIROPRACTOR

## 2023-11-02 NOTE — PROGRESS NOTES
Subjective Finding:    Chief compalint: Patient presents with:  Back Pain: Tightness in lower back and neck region.  Patient had mri report and we discussed , Pain Scale: 5/10, Intensity: sharp, Duration: 2 weeks, Radiating: no.    Date of injury:     Activities that the pain restricts:   Home/household/hobbies/social activities: Yes.  Work duties: Yes.  Sleep: No.  Makes symptoms better: rest.  Makes symptoms worse: activity and walking.  Have you seen anyone else for the symptoms? No.  Work related: No.  Automobile related injury: No.    Objective and Assessment:    Posture Analysis:   High shoulder: right.  Head tilt: right.  High iliac crest: .  Head carriage: forward.  Thoracic Kyphosis: neutral.  Lumbar Lordosis: neutral.    Lumbar Range of Motion: extension decreased.  Cervical Range of Motion: extension decreased.  Thoracic Range of Motion: .  Extremity Range of Motion: .    Palpation:   Quad lumb: bilateral, referred pain: no  T paraspinals: sharp pain, no    Segmental dysfunction pre-treatment and treatment area: C2, C3, T4, T5, and L4.    Assessment post-treatment:  Cervical: ROM increased.  Thoracic: ROM increased.  Lumbar: ROM increased.    Comments: curvature slight in t spine.      Complicating Factors: structural abnormalities.    Procedure(s):  CMT:  72749 Chiropractic manipulative treatment 3-4 regions performed   Cervical: Diversified, See above for level, Supine, Thoracic: Diversified, See above for level, Prone, and Lumbar: Diversified, See above for level, Side posture    Modalities:  None performed this visit    Therapeutic procedures:  None    Plan:  Treatment plan: PRN.  Instructed patient: walk 10 minutes.  Short term goals: increase ROM.  Long term goals: restore normal function.  Prognosis: excellent.

## 2023-11-21 ENCOUNTER — OFFICE VISIT (OUTPATIENT)
Dept: CHIROPRACTIC MEDICINE | Facility: OTHER | Age: 43
End: 2023-11-21
Attending: CHIROPRACTOR
Payer: COMMERCIAL

## 2023-11-21 DIAGNOSIS — M54.2 CERVICALGIA: ICD-10-CM

## 2023-11-21 DIAGNOSIS — M99.03 SEGMENTAL AND SOMATIC DYSFUNCTION OF LUMBAR REGION: ICD-10-CM

## 2023-11-21 DIAGNOSIS — M99.01 SEGMENTAL AND SOMATIC DYSFUNCTION OF CERVICAL REGION: Primary | ICD-10-CM

## 2023-11-21 DIAGNOSIS — M99.02 SEGMENTAL AND SOMATIC DYSFUNCTION OF THORACIC REGION: ICD-10-CM

## 2023-11-21 PROCEDURE — 98941 CHIROPRACT MANJ 3-4 REGIONS: CPT | Mod: AT | Performed by: CHIROPRACTOR

## 2023-11-22 NOTE — PROGRESS NOTES
Subjective Finding:    Chief compalint: Patient presents with:  Back Pain: Low back stiff and neck pain.  Did better with last tx.  ROM in c spine is better    , Pain Scale: 3/10, Intensity: sharp, Duration: 4 weeks, Radiating: no.    Date of injury:     Activities that the pain restricts:   Home/household/hobbies/social activities: Yes.  Work duties: Yes.  Sleep: No.  Makes symptoms better: rest.  Makes symptoms worse: activity and walking.  Have you seen anyone else for the symptoms? No.  Work related: No.  Automobile related injury: No.    Objective and Assessment:    Posture Analysis:   High shoulder: right.  Head tilt: right.  High iliac crest: .  Head carriage: forward.  Thoracic Kyphosis: neutral.  Lumbar Lordosis: neutral.    Lumbar Range of Motion: extension decreased.  Cervical Range of Motion: extension decreased.  Thoracic Range of Motion: .  Extremity Range of Motion: .    Palpation:   Subocc mm  tightness    Segmental dysfunction pre-treatment and treatment area: C12  T6  L34.    Assessment post-treatment:  Cervical: ROM increased.  Thoracic: ROM increased.  Lumbar: ROM increased.    Comments: curvature slight in t spine.      Complicating Factors: structural abnormalities.    Procedure(s):  CMT:  84940 Chiropractic manipulative treatment 3-4 regions performed   Cervical: Diversified, See above for level, Supine, Thoracic: Diversified, See above for level, Prone, and Lumbar: Diversified, See above for level, Side posture    Modalities:  None performed this visit    Therapeutic procedures:  None    Plan:  Treatment plan: PRN.  Instructed patient: walk 10 minutes.  Short term goals: increase ROM.  Long term goals: restore normal function.  Prognosis: excellent.

## 2023-12-08 DIAGNOSIS — J30.89 PERENNIAL ALLERGIC RHINITIS WITH SEASONAL VARIATION: ICD-10-CM

## 2023-12-08 DIAGNOSIS — J30.2 SEASONAL ALLERGIC RHINITIS, UNSPECIFIED TRIGGER: ICD-10-CM

## 2023-12-08 DIAGNOSIS — J30.2 PERENNIAL ALLERGIC RHINITIS WITH SEASONAL VARIATION: ICD-10-CM

## 2023-12-08 DIAGNOSIS — J31.0 CHRONIC RHINITIS: ICD-10-CM

## 2023-12-08 DIAGNOSIS — J30.1 SEASONAL ALLERGIC RHINITIS DUE TO POLLEN: ICD-10-CM

## 2023-12-08 RX ORDER — ALBUTEROL SULFATE 90 UG/1
2 AEROSOL, METERED RESPIRATORY (INHALATION) EVERY 4 HOURS PRN
Qty: 8.5 G | Refills: 1 | Status: SHIPPED | OUTPATIENT
Start: 2023-12-08

## 2023-12-08 NOTE — TELEPHONE ENCOUNTER
Albuterol       Last Written Prescription Date:  3/09/2023  Last Fill Quantity: 18g,   # refills: 1  Last Office Visit: 03/24/2023  Future Office visit:

## 2023-12-10 ENCOUNTER — HEALTH MAINTENANCE LETTER (OUTPATIENT)
Age: 43
End: 2023-12-10

## 2024-01-15 ENCOUNTER — MEDICAL CORRESPONDENCE (OUTPATIENT)
Dept: HEALTH INFORMATION MANAGEMENT | Facility: CLINIC | Age: 44
End: 2024-01-15

## 2024-07-07 ENCOUNTER — HEALTH MAINTENANCE LETTER (OUTPATIENT)
Age: 44
End: 2024-07-07

## 2024-09-15 ENCOUNTER — HEALTH MAINTENANCE LETTER (OUTPATIENT)
Age: 44
End: 2024-09-15

## 2025-01-02 ENCOUNTER — TRANSFERRED RECORDS (OUTPATIENT)
Dept: HEALTH INFORMATION MANAGEMENT | Facility: CLINIC | Age: 45
End: 2025-01-02

## 2025-02-18 ENCOUNTER — OFFICE VISIT (OUTPATIENT)
Dept: OBGYN | Facility: OTHER | Age: 45
End: 2025-02-18
Attending: OBSTETRICS & GYNECOLOGY
Payer: COMMERCIAL

## 2025-02-18 VITALS
HEIGHT: 66 IN | SYSTOLIC BLOOD PRESSURE: 110 MMHG | HEART RATE: 55 BPM | RESPIRATION RATE: 16 BRPM | WEIGHT: 137.5 LBS | DIASTOLIC BLOOD PRESSURE: 60 MMHG | BODY MASS INDEX: 22.1 KG/M2

## 2025-02-18 DIAGNOSIS — D25.0 SUBMUCOUS LEIOMYOMA OF UTERUS: ICD-10-CM

## 2025-02-18 DIAGNOSIS — N92.0 MENORRHAGIA WITH REGULAR CYCLE: ICD-10-CM

## 2025-02-18 DIAGNOSIS — D50.0 IRON DEFICIENCY ANEMIA DUE TO CHRONIC BLOOD LOSS: ICD-10-CM

## 2025-02-18 DIAGNOSIS — Z12.4 SCREENING FOR MALIGNANT NEOPLASM OF CERVIX: Primary | ICD-10-CM

## 2025-02-18 ASSESSMENT — PAIN SCALES - GENERAL: PAINLEVEL_OUTOF10: NO PAIN (0)

## 2025-02-19 NOTE — PROGRESS NOTES
CC:  F/up for abnormal menstrual bleeding/submucosal uterine fibroids. .   HPI:  Sydni Isabel is a 44 year old female is a   P2 (CS).  No LMP recorded.  Menses are regular q 28-30 days,   Lasting 5-6 days with 3d of heavy flow.  Her menstrual flow is limiting her clothing choices and interferes with lifestyle/activities.  She has h/o submucosal fibroids with D and C in  2023.  Significant findings:  Two submucosal fibroids with the larger one unable to be resected hysteroscopically and benign pathology.  She is being treated with iron for anemia from her menses.  She has one week of PMS bloating, fatigue, irritability, headaches, and cramping, LBP, muscle aches.     Clots: Yes  Intermenstrual bleeding: No  Previous work-up: Yes  Prior treatments: D and C/fibroid resection, NSAIDS  Contraception:  Abstinence/not currently sexually active   Abnormal Pap:   Dysmenorrhea: Yes with heavy bleeding  Pelvic pain:No  Dyspareunia: Yes longstanding entry.     Patients records are available and reviewed at today's visit.   Past GYN history:  No STD history       Last PAP smear:  Normal 2021      Past Medical History:   Diagnosis Date    Anemia 01/09/2023       Past Surgical History:   Procedure Laterality Date    c section times 2      COLONOSCOPY - HIM SCAN  07/01/2013    Colonoscopy~Irritable bowel with a repeat age 48, 7/2013    COLONOSCOPY - HIM SCAN N/A 07/01/2024 7/2024 normal recheck 10 years    DILATION AND CURETTAGE SUCTION  07/25/2014    Procedure: DILATION AND CURETTAGE SUCTION;  Surgeon: Oliver Webb MD;  Location: [facility]    DILATION AND CURETTAGE, OPERATIVE HYSTEROSCOPY, COMBINED N/A 03/08/2023    Procedure: Hysteroscopy with Dilation and Curettage, Submucosal Fibroid Resection;  Surgeon: Oliver Webb MD;  Location: [facility]    ENDOSCOPIC SINUS SURGERY N/A 12/20/2017    Procedure: ENDOSCOPIC SINUS SURGERY;  ENDOSCOPIC SINUS SURGERY, SEPTOPLASTY, TURBINATE REDUCTION, REPAIR LEFT NASAL VALVE;  Surgeon:  Yulisa Quiroz MD;  Location: [facility]    ENT SURGERY      sinus    ESOPHAGOSCOPY, GASTROSCOPY, DUODENOSCOPY (EGD), COMBINED N/A 01/08/2016    Procedure: COMBINED ESOPHAGOSCOPY, GASTROSCOPY, DUODENOSCOPY (EGD);  Surgeon: Francois Gordon MD;  Location: [facility]    GYN SURGERY  2000    d & c    SEPTOPLASTY, TURBINOPLASTY, COMBINED N/A 12/20/2017    Procedure: COMBINED SEPTOPLASTY, TURBINOPLASTY;;  Surgeon: Yulisa Quiroz MD;  Location: [facility]    wisdom teeth         Family History   Problem Relation Age of Onset    Hypertension Mother     High cholesterol Mother     Genitourinary Problems Mother         kidney stone    Hypertension Father     High cholesterol Father     Breast Cancer Maternal Grandmother        Allergies: Ciprofloxacin and Seasonal allergies    Current Outpatient Medications   Medication Sig Dispense Refill    albuterol (PROAIR HFA/PROVENTIL HFA/VENTOLIN HFA) 108 (90 Base) MCG/ACT inhaler INHALE 2 PUFFS INTO THE LUNGS EVERY 4 HOURS AS NEEDED FOR SHORTNESS OF BREATH, WHEEZING OR COUGH FOR 3-5 DAYS 8.5 g 1    fluticasone (FLONASE) 50 MCG/ACT nasal spray Spray 2 sprays into both nostrils daily 16 g 11    ibuprofen (ADVIL/MOTRIN) 800 MG tablet Take 1 tablet (800 mg) by mouth every 8 hours as needed (pain) 30 tablet 1    multivitamin w/minerals (THERA-VIT-M) tablet       pantoprazole sodium (PROTONIX) 40 MG packet Take 40 mg by mouth      vitamin D (ERGOCALCIFEROL) 27596 UNIT capsule Take 2,000 Units by mouth daily       azelastine-fluticasone (DYMISTA) 137-50 MCG/ACT nasal spray Spray 1 spray into both nostrils 2 times daily (Patient not taking: Reported on 1/27/2023) 2 Bottle 3    budesonide (PULMICORT) 0.5 MG/2ML neb solution Spray 2 mLs (0.5 mg) in nostril 2 times daily Make 240 cc Pa med sinus irrigation Mix 2 ml vial of budesonide 0.5 mg Rinse BID (Patient not taking: Reported on 3/23/2023) 120 mL 1    budesonide-formoterol (SYMBICORT) 160-4.5 MCG/ACT Inhaler Inhale 2 puffs into the lungs 2  "times daily (Patient not taking: Reported on 3/23/2023)      cetirizine (ZYRTEC) 10 MG tablet Take 1 tablet (10 mg) by mouth every evening 30 tablet 1    EPINEPHrine (ANY BX GENERIC EQUIV) 0.3 MG/0.3ML injection 2-pack Inject 0.3 mLs (0.3 mg) into the muscle as needed for anaphylaxis 0.6 mL 1    loratadine (CLARITIN) 10 MG tablet TAKE 1 TABLET DAILY. 30 tablet 11    montelukast (SINGULAIR) 10 MG tablet TAKE ONE TABLET AT BEDTIME. (Patient not taking: Reported on 3/23/2023) 90 tablet 3         ROS:  CONSTITUTIONAL: NEGATIVE for fever, chills, change in weight  GI: NEGATIVE for nausea, abdominal pain, heartburn, or change in bowel habits  : NEGATIVE for frequency, dysuria, hematuria, vaginal discharge, or irregular bleeding  PSYCHIATRIC: NEGATIVE for changes in mood or affect    EXAM:  Blood pressure 110/60, pulse 55, resp. rate 16, height 1.676 m (5' 6\"), weight 62.4 kg (137 lb 8 oz), not currently breastfeeding.   BMI= Body mass index is 22.19 kg/m .  General - pleasant female in no acute distress.   Neurological -  mental status normal.  Alert and oriented.  Abdomen - soft, nontender, nondistended, no hepatosplenomegaly.  Pelvic - EG: normal adult female, BUS: within normal limits, Vagina: without lesions or  discharge, Cervix: no lesions or CMT, Uterus: firm, normal size, contour, and nontender.   Adnexae: no masses or tenderness.  Anus without lesions  Rectovaginal - deferred.  Ext:  No edema  Neurological -  mental status normal.  Alert and oriented.    Pap done      ASSESSMENT/PLAN:  Menorrhagia, dysmenorrhea, submucosal fibroids.  Failed prior conservative surgical Tx.     Discussed expectant,  medical, IUD and and surgical options(hysterectomy).  She is not a good candidate for IUD/ablation due to fibroids. Patient would like to proceed with definitive surgical management, minimally invasive.  Recommend LSH/bilateral salpingectomy.   Reviewed goals, risks, alternatives for planned procedure;  Including risk " of bleeding, transfusion, infection, damage to nerves, blood vessels, bowel and bladder, blood clots, pneumonia, and other rare or unforseen complications.   Discussed recovery period and expected discomfort.. All questions were answered.  Patient desires to proceed but not until later in the year.  Pt has my card and phone number to call as needed if problems in the interim or she does not hear her results.  .           Oliver Webb MD

## 2025-02-26 LAB
BKR AP ASSOCIATED HPV REPORT: NORMAL
BKR LAB AP GYN ADEQUACY: NORMAL
BKR LAB AP GYN INTERPRETATION: NORMAL
BKR LAB AP GYN OTHER FINDINGS: NORMAL
BKR LAB AP PREVIOUS ABNORMAL: NORMAL
PATH REPORT.COMMENTS IMP SPEC: NORMAL
PATH REPORT.COMMENTS IMP SPEC: NORMAL
PATH REPORT.RELEVANT HX SPEC: NORMAL

## 2025-06-05 ENCOUNTER — TELEPHONE (OUTPATIENT)
Dept: OTOLARYNGOLOGY | Facility: OTHER | Age: 45
End: 2025-06-05

## 2025-06-05 NOTE — TELEPHONE ENCOUNTER
Spoke to patient via phone to see if she would like to check in earlier for her ENT appt. tomorrow.  Patient reported she has to work until 2:00 p.m however will try to check in early.

## 2025-06-10 ENCOUNTER — TELEPHONE (OUTPATIENT)
Dept: ALLERGY | Facility: OTHER | Age: 45
End: 2025-06-10

## 2025-07-13 ENCOUNTER — HEALTH MAINTENANCE LETTER (OUTPATIENT)
Age: 45
End: 2025-07-13

## 2025-07-20 ENCOUNTER — HOSPITAL ENCOUNTER (EMERGENCY)
Facility: HOSPITAL | Age: 45
Discharge: HOME | End: 2025-07-20
Attending: PHYSICIAN ASSISTANT
Payer: COMMERCIAL

## 2025-07-20 VITALS
DIASTOLIC BLOOD PRESSURE: 70 MMHG | SYSTOLIC BLOOD PRESSURE: 113 MMHG | HEART RATE: 85 BPM | RESPIRATION RATE: 18 BRPM | OXYGEN SATURATION: 100 % | TEMPERATURE: 99 F

## 2025-07-20 DIAGNOSIS — B34.9 VIRAL ILLNESS: Primary | ICD-10-CM

## 2025-07-20 LAB
S PYO DNA THROAT QL NAA+PROBE: NOT DETECTED
SARS-COV-2 RNA RESP QL NAA+PROBE: NEGATIVE

## 2025-07-20 PROCEDURE — 87651 STREP A DNA AMP PROBE: CPT | Performed by: PHYSICIAN ASSISTANT

## 2025-07-20 PROCEDURE — G0463 HOSPITAL OUTPT CLINIC VISIT: HCPCS | Performed by: PHYSICIAN ASSISTANT

## 2025-07-20 PROCEDURE — 87635 SARS-COV-2 COVID-19 AMP PRB: CPT | Performed by: PHYSICIAN ASSISTANT

## 2025-07-20 PROCEDURE — 99213 OFFICE O/P EST LOW 20 MIN: CPT | Performed by: PHYSICIAN ASSISTANT

## 2025-07-20 ASSESSMENT — COLUMBIA-SUICIDE SEVERITY RATING SCALE - C-SSRS
2. HAVE YOU ACTUALLY HAD ANY THOUGHTS OF KILLING YOURSELF IN THE PAST MONTH?: NO
1. IN THE PAST MONTH, HAVE YOU WISHED YOU WERE DEAD OR WISHED YOU COULD GO TO SLEEP AND NOT WAKE UP?: NO
6. HAVE YOU EVER DONE ANYTHING, STARTED TO DO ANYTHING, OR PREPARED TO DO ANYTHING TO END YOUR LIFE?: NO

## 2025-07-20 NOTE — ED TRIAGE NOTES
Pt presens today with c/o cough, fever, post nasal drip, and sore throat and nasal congestion. Started Wednesday

## 2025-07-20 NOTE — ED PROVIDER NOTES
History     Chief Complaint   Patient presents with    Cough     HPI  Sydni Isabel is a 44 year old female who presents today with cold symptoms over the last 5 days.  She initially had a fever of 102.2 this past Wednesday 5 days ago and fever resolved in 1 day and she has not had a fever since.  She still experiences some aches and chills over the last day and a half.  She has had a headache with sinus pressure and some dizziness.  Her ears feel full.  She has had sore throat with postnasal drainage.  Cough due to the postnasal drainage but she has noticed some dyspnea on exertion.  Some nausea and diarrhea the last few days with decreased appetite.      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  Her mom has some cold symptoms but they are different than hers.    Allergies:  Allergies   Allergen Reactions    Ciprofloxacin Other (See Comments)     Leg pain    Seasonal Allergies Cough       Problem List:    Patient Active Problem List    Diagnosis Date Noted    Papilloma of eyelid, unspecified laterality 02/17/2021     Priority: Medium     Formatting of this note might be different from the original.  both eyes      Pinguecula of both eyes 02/17/2021     Priority: Medium    Family history of glaucoma in father 02/11/2021     Priority: Medium    Myopia of both eyes with astigmatism 02/11/2021     Priority: Medium    Contact lens/glasses fitting 02/11/2021     Priority: Medium    Anxiety about health 11/03/2017     Priority: Medium    Perennial allergic rhinitis with seasonal variation 09/26/2017     Priority: Medium    Chronic maxillary sinusitis 07/10/2017     Priority: Medium    Chronic frontal sinusitis 07/10/2017     Priority: Medium    DNS (deviated nasal septum) 07/10/2017     Priority: Medium    Nasal turbinate hypertrophy 07/10/2017     Priority: Medium    Allergic rhinitis 05/03/2017     Priority: Medium    ACP (advance care planning) 08/05/2016     Priority: Medium      Advance Care Planning 2016: ACP Review of Chart / Resources Provided:  Reviewed chart for advance care plan.  Sydni Isabel has no plan or code status on file. Discussed available resources and provided with information. Confirmed code status reflects current choices pending further ACP discussions.  Confirmed/documented legally designated decision makers.  Added by Michelle Whitney            Threatened  2014     Priority: Medium     Problem list name updated by automated process. Provider to review      Heartburn 2009     Priority: Medium    Lattice degeneration of peripheral retina 2009     Priority: Medium     Formatting of this note might be different from the original.  IMO Update 10/11          Past Medical History:    Past Medical History:   Diagnosis Date    Anemia 2023       Past Surgical History:    Past Surgical History:   Procedure Laterality Date    c section times 2      COLONOSCOPY - HIM SCAN  2013    Colonoscopy~Irritable bowel with a repeat age 48, 2013    COLONOSCOPY - HIM SCAN N/A 2024 normal recheck 10 years    DILATION AND CURETTAGE SUCTION  2014    Procedure: DILATION AND CURETTAGE SUCTION;  Surgeon: Oliver Webb MD;  [facility]    DILATION AND CURETTAGE, OPERATIVE HYSTEROSCOPY, COMBINED N/A 2023    Procedure: Hysteroscopy with Dilation and Curettage, Submucosal Fibroid Resection;  Surgeon: Oliver Webb MD;  [facility]    ENDOSCOPIC SINUS SURGERY N/A 2017    Procedure: ENDOSCOPIC SINUS SURGERY;  ENDOSCOPIC SINUS SURGERY, SEPTOPLASTY, TURBINATE REDUCTION, REPAIR LEFT NASAL VALVE;  Surgeon: Yulisa Quiroz MD;  Location: [facility]    ENT SURGERY      sinus    ESOPHAGOSCOPY, GASTROSCOPY, DUODENOSCOPY (EGD), COMBINED N/A 2016    Procedure: COMBINED ESOPHAGOSCOPY, GASTROSCOPY, DUODENOSCOPY (EGD);  Surgeon: Francois Gordon MD;  [facility]: [facility]    GYN SURGERY      d & c    SEPTOPLASTY, TURBINOPLASTY,  COMBINED N/A 12/20/2017    Procedure: COMBINED SEPTOPLASTY, TURBINOPLASTY;;  Surgeon: Yulisa Quiroz MD;  [facility]    wisdom teeth         Family History:    Family History   Problem Relation Age of Onset    Hypertension Mother     High cholesterol Mother     Genitourinary Problems Mother         kidney stone    Hypertension Father     High cholesterol Father     Breast Cancer Maternal Grandmother        Social History:  Marital Status:  Single [1]  Social History     Tobacco Use    Smoking status: Never    Smokeless tobacco: Never   Substance Use Topics    Alcohol use: No    Drug use: No        Medications:    albuterol (PROAIR HFA/PROVENTIL HFA/VENTOLIN HFA) 108 (90 Base) MCG/ACT inhaler  cetirizine (ZYRTEC) 10 MG tablet  EPINEPHrine (ANY BX GENERIC EQUIV) 0.3 MG/0.3ML injection 2-pack  fexofenadine (ALLEGRA) 180 MG tablet  fluticasone (FLONASE) 50 MCG/ACT nasal spray  ibuprofen (ADVIL/MOTRIN) 800 MG tablet  loratadine (CLARITIN) 10 MG tablet  montelukast (SINGULAIR) 10 MG tablet  multivitamin w/minerals (THERA-VIT-M) tablet  pantoprazole sodium (PROTONIX) 40 MG packet  vitamin D (ERGOCALCIFEROL) 75221 UNIT capsule          Review of Systems please see above.     Physical Exam   BP: 113/70  Pulse: 85  Temp: 99  F (37.2  C)  Resp: 18  SpO2: 100 %      Physical Exam    GENERAL INSPECTION: Alert, cooperative and in no acute distress.   SKIN: Pink, warm and dry without rash.  HEAD: No sinus tenderness to percussion.  EYES: Conjunctiva clear, sclera white. No discharge.  EARS: No masses or lesions of auricles or canals. No tragal tenderness. TMs pearly gray, landmarks and light reflex visible and non distorted.   NOSE: Mucosa pink and moist without discharge.  MOUTH/THROAT: Oral mucosa pink and moist. Pharynx with mild erythema.   NECK: Supple without lymphadenopathy.  CV: RRR without ectopy or murmur.  LUNGS/THORAX: Unlabored respirations. Clear to auscultation without wheezes, rales or  rhonchi.  LYMPHNODES: No clavicular or axillary lymph adenopathy.   PSYCH: Pleasant and cooperative.      ED Course             No results found for this or any previous visit (from the past 24 hours).    Medications - No data to display    Assessments & Plan (with Medical Decision Making)     I have reviewed the nursing notes.    I have reviewed the findings, diagnosis, plan and need for follow up with the patient.  Quick strep negative.     COVID negative    New Prescriptions    No medications on file       Final diagnoses:   None   Symptomatic care with OTC analgesics and decongestants is recommended- ok to take dayquill an nyquill. Increase fluid intake and rest. Monitor symptoms and follow up if there is no improvement or any concerns.       7/20/2025   [facility]

## 2025-07-21 ENCOUNTER — MYC REFILL (OUTPATIENT)
Dept: OTOLARYNGOLOGY | Facility: OTHER | Age: 45
End: 2025-07-21

## 2025-07-21 DIAGNOSIS — J31.0 CHRONIC RHINITIS: ICD-10-CM

## 2025-07-23 RX ORDER — EPINEPHRINE 0.3 MG/.3ML
0.3 INJECTION SUBCUTANEOUS PRN
Qty: 0.6 ML | Refills: 0 | Status: SHIPPED | OUTPATIENT
Start: 2025-07-23

## 2025-07-23 RX ORDER — FLUTICASONE PROPIONATE 50 MCG
2 SPRAY, SUSPENSION (ML) NASAL DAILY
Qty: 16 G | Refills: 10 | Status: SHIPPED | OUTPATIENT
Start: 2025-07-23

## 2025-07-23 NOTE — TELEPHONE ENCOUNTER
EpiPen  Last Written Prescription Date:  3.9.23  Last Fill Quantity: #2,   # refills: 1  Last Office Visit: 6.6.25  Future Office visit:       Routing refill request to provider for review/approval because:

## 2025-07-25 ENCOUNTER — APPOINTMENT (OUTPATIENT)
Dept: GENERAL RADIOLOGY | Facility: HOSPITAL | Age: 45
End: 2025-07-25
Attending: NURSE PRACTITIONER
Payer: COMMERCIAL

## 2025-07-25 ENCOUNTER — HOSPITAL ENCOUNTER (EMERGENCY)
Facility: HOSPITAL | Age: 45
Discharge: HOME | End: 2025-07-25
Attending: NURSE PRACTITIONER | Admitting: NURSE PRACTITIONER
Payer: COMMERCIAL

## 2025-07-25 VITALS
TEMPERATURE: 98.6 F | RESPIRATION RATE: 18 BRPM | SYSTOLIC BLOOD PRESSURE: 131 MMHG | HEART RATE: 68 BPM | DIASTOLIC BLOOD PRESSURE: 81 MMHG | OXYGEN SATURATION: 98 %

## 2025-07-25 DIAGNOSIS — J01.90 ACUTE SINUSITIS WITH SYMPTOMS > 10 DAYS: Primary | ICD-10-CM

## 2025-07-25 PROCEDURE — G0463 HOSPITAL OUTPT CLINIC VISIT: HCPCS | Mod: 25 | Performed by: NURSE PRACTITIONER

## 2025-07-25 PROCEDURE — 71046 X-RAY EXAM CHEST 2 VIEWS: CPT

## 2025-07-25 PROCEDURE — 99213 OFFICE O/P EST LOW 20 MIN: CPT | Performed by: NURSE PRACTITIONER

## 2025-07-25 PROCEDURE — 71046 X-RAY EXAM CHEST 2 VIEWS: CPT | Mod: 26 | Performed by: RADIOLOGY

## 2025-07-25 ASSESSMENT — ENCOUNTER SYMPTOMS
SORE THROAT: 0
ABDOMINAL PAIN: 0
FEVER: 1
NECK STIFFNESS: 0
HEADACHES: 0
SINUS PRESSURE: 1
NECK PAIN: 0
TROUBLE SWALLOWING: 0
SINUS PAIN: 1
NAUSEA: 0
SHORTNESS OF BREATH: 1
EYE REDNESS: 0
EYE DISCHARGE: 0
VOMITING: 0
PSYCHIATRIC NEGATIVE: 1
DIARRHEA: 0
MYALGIAS: 0
RHINORRHEA: 1
CHILLS: 0
COUGH: 1

## 2025-07-25 ASSESSMENT — COLUMBIA-SUICIDE SEVERITY RATING SCALE - C-SSRS
1. IN THE PAST MONTH, HAVE YOU WISHED YOU WERE DEAD OR WISHED YOU COULD GO TO SLEEP AND NOT WAKE UP?: NO
2. HAVE YOU ACTUALLY HAD ANY THOUGHTS OF KILLING YOURSELF IN THE PAST MONTH?: NO
6. HAVE YOU EVER DONE ANYTHING, STARTED TO DO ANYTHING, OR PREPARED TO DO ANYTHING TO END YOUR LIFE?: NO

## 2025-07-25 NOTE — ED NOTES
RUTHANN Navas CNP assessed patient in triage and determined patient Urgent Care appropriate. Will be seen in Urgent Care.

## 2025-07-25 NOTE — ED PROVIDER NOTES
History     Chief Complaint   Patient presents with    Shortness of Breath     HPI  Sydni Isabel is a 44 year old female who presents to urgent care today ambulatory with complaints of fever which resolved, congestion, rhinorrhea, sinus pain and pressure, cough and shortness of breath.  Symptoms have been ongoing for over a week.  No asthma.  Staying hydrated.  No rashes.  No OTC medication.  Has seasonal allergies, has not been taking any antihistamines recently.  No other concerns    Allergies:  Allergies   Allergen Reactions    Ciprofloxacin Other (See Comments)     Leg pain    Seasonal Allergies Cough       Problem List:    Patient Active Problem List    Diagnosis Date Noted    Papilloma of eyelid, unspecified laterality 02/17/2021     Priority: Medium     Formatting of this note might be different from the original.  both eyes      Pinguecula of both eyes 02/17/2021     Priority: Medium    Family history of glaucoma in father 02/11/2021     Priority: Medium    Myopia of both eyes with astigmatism 02/11/2021     Priority: Medium    Contact lens/glasses fitting 02/11/2021     Priority: Medium    Anxiety about health 11/03/2017     Priority: Medium    Perennial allergic rhinitis with seasonal variation 09/26/2017     Priority: Medium    Chronic maxillary sinusitis 07/10/2017     Priority: Medium    Chronic frontal sinusitis 07/10/2017     Priority: Medium    DNS (deviated nasal septum) 07/10/2017     Priority: Medium    Nasal turbinate hypertrophy 07/10/2017     Priority: Medium    Allergic rhinitis 05/03/2017     Priority: Medium    ACP (advance care planning) 08/05/2016     Priority: Medium     Advance Care Planning 7/1/2016: ACP Review of Chart / Resources Provided:  Reviewed chart for advance care plan.  Sydni Isabel has no plan or code status on file. Discussed available resources and provided with information. Confirmed code status reflects current choices pending further ACP discussions.   Confirmed/documented legally designated decision makers.  Added by Michelle Whitney            Threatened  2014     Priority: Medium     Problem list name updated by automated process. Provider to review      Heartburn 2009     Priority: Medium    Lattice degeneration of peripheral retina 2009     Priority: Medium     Formatting of this note might be different from the original.  IMO Update 10/11          Past Medical History:    Past Medical History:   Diagnosis Date    Anemia 2023       Past Surgical History:    Past Surgical History:   Procedure Laterality Date    c section times 2      COLONOSCOPY - HIM SCAN  2013    Colonoscopy~Irritable bowel with a repeat age 48, 2013    COLONOSCOPY - HIM SCAN N/A 2024 normal recheck 10 years    DILATION AND CURETTAGE SUCTION  2014    Procedure: DILATION AND CURETTAGE SUCTION;  Surgeon: Oliver Webb MD;  [facility]    DILATION AND CURETTAGE, OPERATIVE HYSTEROSCOPY, COMBINED N/A 2023    Procedure: Hysteroscopy with Dilation and Curettage, Submucosal Fibroid Resection;  Surgeon: Oliver Webb MD;  [facility]    ENDOSCOPIC SINUS SURGERY N/A 2017    Procedure: ENDOSCOPIC SINUS SURGERY;  ENDOSCOPIC SINUS SURGERY, SEPTOPLASTY, TURBINATE REDUCTION, REPAIR LEFT NASAL VALVE;  Surgeon: Yulisa Quiroz MD;  Location: [facility]    ENT SURGERY      sinus    ESOPHAGOSCOPY, GASTROSCOPY, DUODENOSCOPY (EGD), COMBINED N/A 2016    Procedure: COMBINED ESOPHAGOSCOPY, GASTROSCOPY, DUODENOSCOPY (EGD);  Surgeon: Francois Gordon MD;  Location: [facility]    GYN SURGERY      d & c    SEPTOPLASTY, TURBINOPLASTY, COMBINED N/A 2017    Procedure: COMBINED SEPTOPLASTY, TURBINOPLASTY;;  Surgeon: Yulisa Quiroz MD;  [facility]    wisdom teeth         Family History:    Family History   Problem Relation Age of Onset    Hypertension Mother     High cholesterol Mother     Genitourinary Problems Mother          kidney stone    Hypertension Father     High cholesterol Father     Breast Cancer Maternal Grandmother        Social History:  Marital Status:  Single [1]  Social History     Tobacco Use    Smoking status: Never    Smokeless tobacco: Never   Substance Use Topics    Alcohol use: No    Drug use: No        Medications:    amoxicillin-clavulanate (AUGMENTIN) 875-125 MG tablet  albuterol (PROAIR HFA/PROVENTIL HFA/VENTOLIN HFA) 108 (90 Base) MCG/ACT inhaler  cefdinir (OMNICEF) 300 MG capsule  cetirizine (ZYRTEC) 10 MG tablet  EPINEPHrine (ANY BX GENERIC EQUIV) 0.3 MG/0.3ML injection 2-pack  fexofenadine (ALLEGRA) 180 MG tablet  fluticasone (FLONASE) 50 MCG/ACT nasal spray  ibuprofen (ADVIL/MOTRIN) 800 MG tablet  loratadine (CLARITIN) 10 MG tablet  montelukast (SINGULAIR) 10 MG tablet  multivitamin w/minerals (THERA-VIT-M) tablet  pantoprazole sodium (PROTONIX) 40 MG packet  vitamin D (ERGOCALCIFEROL) 08846 UNIT capsule      Review of Systems   Constitutional:  Positive for fever (resolved). Negative for chills.   HENT:  Positive for congestion, rhinorrhea, sinus pressure and sinus pain. Negative for ear pain, sore throat and trouble swallowing.    Eyes:  Negative for discharge and redness.   Respiratory:  Positive for cough and shortness of breath.    Cardiovascular:  Negative for chest pain.   Gastrointestinal:  Negative for abdominal pain, diarrhea, nausea and vomiting.   Genitourinary:  Negative for decreased urine volume.   Musculoskeletal:  Negative for gait problem, myalgias, neck pain and neck stiffness.   Skin:  Negative for rash.   Neurological:  Negative for headaches.   Psychiatric/Behavioral: Negative.       Physical Exam   BP: 131/81  Pulse: 68  Temp: 98.6  F (37  C)  Resp: 18  SpO2: 98 %    Physical Exam  Vitals and nursing note reviewed.   Constitutional:       General: She is not in acute distress.     Appearance: Normal appearance. She is not ill-appearing or toxic-appearing.   HENT:      Right  Ear: Tympanic membrane, ear canal and external ear normal.      Left Ear: Tympanic membrane, ear canal and external ear normal.      Nose: Congestion and rhinorrhea present.      Right Sinus: Maxillary sinus tenderness and frontal sinus tenderness present.      Left Sinus: Maxillary sinus tenderness and frontal sinus tenderness present.      Mouth/Throat:      Mouth: Mucous membranes are moist.      Pharynx: Oropharynx is clear. No oropharyngeal exudate or posterior oropharyngeal erythema.   Cardiovascular:      Rate and Rhythm: Normal rate and regular rhythm.      Pulses: Normal pulses.      Heart sounds: Normal heart sounds.   Pulmonary:      Effort: Pulmonary effort is normal.      Breath sounds: Normal breath sounds.   Musculoskeletal:      Cervical back: Normal range of motion and neck supple. No rigidity or tenderness.   Lymphadenopathy:      Cervical: No cervical adenopathy.   Neurological:      Mental Status: She is alert.   Psychiatric:         Mood and Affect: Mood normal.       ED Course     Procedures    Recent Results (from the past 24 hours)   Chest XR,  PA & LAT    Narrative    PROCEDURE:  XR CHEST 2 VIEWS    HISTORY:  Cough SOB.     COMPARISON:  None.    FINDINGS:   The cardiac silhouette is normal in size. The pulmonary vasculature is  normal.  The lungs are clear. No pleural effusion or pneumothorax.      Impression    IMPRESSION:  No acute cardiopulmonary disease.      ASIA HODGE MD         SYSTEM ID:  [id number]       Medications - No data to display    Assessments & Plan (with Medical Decision Making)     I have reviewed the nursing notes.    I have reviewed the findings, diagnosis, plan and need for follow up with the patient.  (J01.90) Acute sinusitis with symptoms > 10 days  (primary encounter diagnosis)  Plan:   Patient ambulatory with a nontoxic appearance.  Lungs clear throughout, chest x-ray shows no acute cardiopulmonary disease.  No signs of otitis media or strep.  Maxillary and  frontal sinusitis, ongoing for a week and a half.  Staying hydrated.  No rashes.  Will start patient on Augmentin for sinusitis.  Recommend over-the-counter Flonase.  Follow-up with primary care provider or return to urgent care/ED with any worsening in condition or additional concerns.  Patient in agreement treatment plan.    Discharge Medication List as of 7/25/2025  8:55 AM        START taking these medications    Details   amoxicillin-clavulanate (AUGMENTIN) 875-125 MG tablet Take 1 tablet by mouth 2 times daily for 10 days., Disp-20 tablet, R-0, E-Prescribe           Final diagnoses:   Acute sinusitis with symptoms > 10 days     7/25/2025   [facility]       Nelsy Navas NP  07/25/25 [id number]

## 2025-07-25 NOTE — DISCHARGE INSTRUCTIONS
Augmentin as ordered  - Take entire course of antibiotic even if you start to feel better.  - Antibiotics can cause stomach upset including nausea and diarrhea. Read your bottle or ask the pharmacist if antibiotic can be taken with food to help prevent nausea. If you have symptoms of diarrhea you can take an over-the-counter probiotic and/or increase foods with probiotics such as yogurt, [place], sauerkraut.    OTC Flonase as needed for nasal congestion    Follow up with primary care provider or return to urgent care/ED with any worsening in condition or additional concerns.

## 2025-08-10 ENCOUNTER — MYC MEDICAL ADVICE (OUTPATIENT)
Dept: OTOLARYNGOLOGY | Facility: OTHER | Age: 45
End: 2025-08-10

## 2025-08-10 DIAGNOSIS — J31.0 CHRONIC RHINITIS: ICD-10-CM

## 2025-08-10 DIAGNOSIS — J32.0 CHRONIC MAXILLARY SINUSITIS: Primary | ICD-10-CM

## 2025-08-19 ENCOUNTER — MYC MEDICAL ADVICE (OUTPATIENT)
Dept: OBGYN | Facility: OTHER | Age: 45
End: 2025-08-19

## 2025-08-21 DIAGNOSIS — R10.2 PELVIC PAIN IN FEMALE: ICD-10-CM

## 2025-08-21 DIAGNOSIS — N95.1 MENOPAUSAL SYNDROME (HOT FLASHES): ICD-10-CM

## 2025-08-21 DIAGNOSIS — N93.9 ABNORMAL UTERINE BLEEDING (AUB): Primary | ICD-10-CM

## 2025-08-21 DIAGNOSIS — D25.9 FIBROID UTERUS: ICD-10-CM

## 2025-08-23 PROBLEM — B34.9 VIRAL ILLNESS: Status: ACTIVE | Noted: 2025-08-23

## 2025-08-25 ENCOUNTER — LAB (OUTPATIENT)
Dept: LAB | Facility: OTHER | Age: 45
End: 2025-08-25
Attending: NURSE PRACTITIONER
Payer: COMMERCIAL

## 2025-08-25 ENCOUNTER — HOSPITAL ENCOUNTER (OUTPATIENT)
Dept: CT IMAGING | Facility: HOSPITAL | Age: 45
Discharge: HOME | End: 2025-08-25
Attending: NURSE PRACTITIONER
Payer: COMMERCIAL

## 2025-08-25 DIAGNOSIS — J32.0 CHRONIC MAXILLARY SINUSITIS: ICD-10-CM

## 2025-08-25 DIAGNOSIS — J31.0 CHRONIC RHINITIS: ICD-10-CM

## 2025-08-25 PROCEDURE — 70486 CT MAXILLOFACIAL W/O DYE: CPT

## 2025-08-25 PROCEDURE — 70486 CT MAXILLOFACIAL W/O DYE: CPT | Mod: 26 | Performed by: RADIOLOGY

## 2025-09-02 ENCOUNTER — HOSPITAL ENCOUNTER (OUTPATIENT)
Dept: ULTRASOUND IMAGING | Facility: HOSPITAL | Age: 45
Discharge: HOME | End: 2025-09-02
Attending: OBSTETRICS & GYNECOLOGY
Payer: COMMERCIAL

## 2025-09-02 DIAGNOSIS — N93.9 ABNORMAL UTERINE BLEEDING (AUB): ICD-10-CM

## 2025-09-02 DIAGNOSIS — N95.1 MENOPAUSAL SYNDROME (HOT FLASHES): ICD-10-CM

## 2025-09-02 DIAGNOSIS — D25.9 FIBROID UTERUS: ICD-10-CM

## 2025-09-02 DIAGNOSIS — R10.2 PELVIC PAIN IN FEMALE: ICD-10-CM

## 2025-09-02 PROCEDURE — 76856 US EXAM PELVIC COMPLETE: CPT | Mod: 26 | Performed by: RADIOLOGY

## 2025-09-02 PROCEDURE — 76830 TRANSVAGINAL US NON-OB: CPT | Mod: 26 | Performed by: RADIOLOGY

## 2025-09-02 PROCEDURE — 76830 TRANSVAGINAL US NON-OB: CPT

## (undated) DEVICE — TRAY PREP DRY SKIN SCRUB 067

## (undated) DEVICE — BLADE-SCALPEL #15

## (undated) DEVICE — LABEL STERILE PREPRINTED FOR OR FRRH01-2M

## (undated) DEVICE — BLADE-FUSION ROTATABLE QUADCUT 4.3MM X 13CM

## (undated) DEVICE — BLANKET-BAIR LOWER EXTREMITY

## (undated) DEVICE — CANISTER-SUCTION 2000CC

## (undated) DEVICE — CANISTER SUCTION MEDI-VAC GUARDIAN 2000ML 90D 65651-220

## (undated) DEVICE — COBLATION WAND-TURBINATE REDUCT. PTR

## (undated) DEVICE — SET TUBING OUTFLOW FLUID MANAGER STRL DISP 4170.901

## (undated) DEVICE — GOWN-SURG XL LVL 3 REINFORCED

## (undated) DEVICE — DRAPE STERI TOWEL LG 1010

## (undated) DEVICE — CAUTERY PAD-POLYHESIVE II ADULT

## (undated) DEVICE — GLV 8.5 BIOGEL LATEX 30485-01

## (undated) DEVICE — TUBING INFLOW HYSTEROPUMP 4170.223

## (undated) DEVICE — ADAPTER-SPECIMEN TRAP

## (undated) DEVICE — TRACKER-INSTRUMENT ENT NAV

## (undated) DEVICE — PRESSURE INFUSOR DISP. 3000CC 233000

## (undated) DEVICE — SET-TUR Y-TYPE BLADDER IRRIGATION

## (undated) DEVICE — PACK-ENT-CUSTOM

## (undated) DEVICE — TUBING-IRRIGATOR STRAIGHTSHOT FUSION

## (undated) DEVICE — BIN-UROLOGY / CYSTO BN47

## (undated) DEVICE — SOL-NACL 0.9% 1000ML

## (undated) DEVICE — TRACKER-PATIENT ENT NIPT

## (undated) DEVICE — PACK BASIN SET UP SUTCNBSBBA

## (undated) DEVICE — PACK GYN CYSTO CUSTOM SMA32GCMBF

## (undated) DEVICE — SOL NACL 0.9% IRRIG 1000ML BOTTLE 2F7124

## (undated) DEVICE — SPONGE LAP 18X18" X8435

## (undated) DEVICE — SOL WATER IRRIG 1000ML BOTTLE 2F7114

## (undated) DEVICE — PAD PERI INDIV WRAP 11" 2022A

## (undated) DEVICE — CATH SELF CATH MALE 14FR 414

## (undated) DEVICE — LABEL-STERILE PREPRINTED FOR OR

## (undated) DEVICE — SOL NACL 0.9% IRRIG 3000ML BAG 2B7477

## (undated) DEVICE — TUBING SUCTION 20FT N620A

## (undated) DEVICE — SPONGE RAY-TEC 4X4" 7317

## (undated) DEVICE — INFLATION DEVICE-SINUS BALLOON CATH

## (undated) DEVICE — SUTURE-SILK 0 SH K834H

## (undated) DEVICE — POUCH-INSTRUMENT 2 COMP. 7 X 11IN

## (undated) DEVICE — TUBING-IRRIG. SYSTEM CLEARVISION

## (undated) DEVICE — INSTRUMENT WIPE-VISIWIPE

## (undated) DEVICE — RELIEVA SPINPLUS BALLOON SYSTEM

## (undated) DEVICE — Device

## (undated) DEVICE — IRRIGATION-GLYCINE 1.5% 3000ML

## (undated) DEVICE — SCD SLEEVE-KNEE REG.

## (undated) DEVICE — LIGHT HANDLE COVER

## (undated) DEVICE — DRSG NON ADHERING 3 X 8 TELFA 1238

## (undated) DEVICE — IRRIGATION-H2O 1000ML

## (undated) DEVICE — SUTURE-CHROMIC GUT 4-0 RB-1 U203H

## (undated) DEVICE — SUTURE-VICRYL 4-0 P-3 J494G

## (undated) DEVICE — COVER LT HANDLE 2/PK 5160-2FG

## (undated) DEVICE — IRRIGATION-NACL 1000ML

## (undated) DEVICE — ESU GROUND PAD ADULT W/CORD E7507

## (undated) DEVICE — ENDO SEAL SCOPE SELF SEAL 1.2MM 26153EAU

## (undated) DEVICE — GLV-6.5 PROTEXIS PI CLASSIC LF/PF

## (undated) DEVICE — BIN-ENT BIN

## (undated) RX ORDER — DEXAMETHASONE SODIUM PHOSPHATE 10 MG/ML
INJECTION, SOLUTION INTRAMUSCULAR; INTRAVENOUS
Status: DISPENSED
Start: 2017-12-20

## (undated) RX ORDER — DEXAMETHASONE SODIUM PHOSPHATE 10 MG/ML
INJECTION, SOLUTION INTRAMUSCULAR; INTRAVENOUS
Status: DISPENSED
Start: 2023-03-08

## (undated) RX ORDER — PROPOFOL 10 MG/ML
INJECTION, EMULSION INTRAVENOUS
Status: DISPENSED
Start: 2023-03-08

## (undated) RX ORDER — TRANEXAMIC ACID 100 MG/ML
INJECTION, SOLUTION INTRAVENOUS
Status: DISPENSED
Start: 2023-03-08

## (undated) RX ORDER — PROPOFOL 10 MG/ML
INJECTION, EMULSION INTRAVENOUS
Status: DISPENSED
Start: 2017-12-20

## (undated) RX ORDER — LIDOCAINE HYDROCHLORIDE 20 MG/ML
INJECTION, SOLUTION EPIDURAL; INFILTRATION; INTRACAUDAL; PERINEURAL
Status: DISPENSED
Start: 2017-12-20

## (undated) RX ORDER — ROCURONIUM BROMIDE 50 MG/5 ML
SYRINGE (ML) INTRAVENOUS
Status: DISPENSED
Start: 2017-12-20

## (undated) RX ORDER — FENTANYL CITRATE 50 UG/ML
INJECTION, SOLUTION INTRAMUSCULAR; INTRAVENOUS
Status: DISPENSED
Start: 2017-12-20

## (undated) RX ORDER — ONDANSETRON 2 MG/ML
INJECTION INTRAMUSCULAR; INTRAVENOUS
Status: DISPENSED
Start: 2017-12-20

## (undated) RX ORDER — ONDANSETRON 2 MG/ML
INJECTION INTRAMUSCULAR; INTRAVENOUS
Status: DISPENSED
Start: 2023-03-08